# Patient Record
Sex: MALE | Race: WHITE | NOT HISPANIC OR LATINO | Employment: FULL TIME | URBAN - METROPOLITAN AREA
[De-identification: names, ages, dates, MRNs, and addresses within clinical notes are randomized per-mention and may not be internally consistent; named-entity substitution may affect disease eponyms.]

---

## 2022-06-29 ENCOUNTER — OFFICE VISIT (OUTPATIENT)
Dept: RADIATION ONCOLOGY | Facility: HOSPITAL | Age: 67
End: 2022-06-29

## 2022-06-29 ENCOUNTER — HOSPITAL ENCOUNTER (OUTPATIENT)
Dept: RADIATION ONCOLOGY | Facility: HOSPITAL | Age: 67
Setting detail: RADIATION/ONCOLOGY SERIES
Discharge: HOME | End: 2022-06-29

## 2022-06-29 VITALS
DIASTOLIC BLOOD PRESSURE: 68 MMHG | WEIGHT: 269.9 LBS | TEMPERATURE: 98.5 F | BODY MASS INDEX: 36.56 KG/M2 | HEIGHT: 72 IN | RESPIRATION RATE: 18 BRPM | OXYGEN SATURATION: 94 % | SYSTOLIC BLOOD PRESSURE: 107 MMHG | HEART RATE: 90 BPM

## 2022-06-29 DIAGNOSIS — C67.4 MALIGNANT NEOPLASM OF POSTERIOR WALL OF URINARY BLADDER: Primary | ICD-10-CM

## 2022-06-29 RX ORDER — LOSARTAN POTASSIUM 50 MG/1
50 TABLET ORAL DAILY
COMMUNITY
Start: 2022-06-20

## 2022-06-29 RX ORDER — VERAPAMIL HYDROCHLORIDE 240 MG/1
240 CAPSULE, EXTENDED RELEASE ORAL NIGHTLY
COMMUNITY
Start: 2022-06-20

## 2022-06-29 RX ORDER — FEXOFENADINE HCL 180 MG/1
180 TABLET ORAL DAILY
COMMUNITY

## 2022-06-29 RX ORDER — GABAPENTIN 600 MG/1
600 TABLET ORAL 3 TIMES DAILY
COMMUNITY
Start: 2022-06-23

## 2022-06-29 RX ORDER — ATORVASTATIN CALCIUM 20 MG/1
20 TABLET, FILM COATED ORAL DAILY
COMMUNITY
Start: 2022-06-20

## 2022-06-29 RX ORDER — FAMOTIDINE 40 MG/1
40 TABLET, FILM COATED ORAL 3 TIMES DAILY
COMMUNITY
Start: 2022-06-20

## 2022-06-29 RX ORDER — MULTIPLE VITAMINS W/ MINERALS TAB 9MG-400MCG
1 TAB ORAL DAILY
COMMUNITY

## 2022-06-29 RX ORDER — HYDROCODONE BITARTRATE AND ACETAMINOPHEN 10; 325 MG/1; MG/1
1 TABLET ORAL EVERY 8 HOURS PRN
COMMUNITY
Start: 2022-06-23 | End: 2022-08-07 | Stop reason: HOSPADM

## 2022-06-29 RX ORDER — MONTELUKAST SODIUM 10 MG/1
10 TABLET ORAL NIGHTLY
COMMUNITY
Start: 2022-06-20

## 2022-06-29 RX ORDER — ALFUZOSIN HYDROCHLORIDE 10 MG/1
10 TABLET, EXTENDED RELEASE ORAL EVERY EVENING
COMMUNITY
Start: 2022-05-13 | End: 2022-08-02

## 2022-06-29 RX ORDER — TIZANIDINE 4 MG/1
4 TABLET ORAL EVERY 8 HOURS PRN
COMMUNITY
Start: 2022-06-20

## 2022-06-29 NOTE — PROGRESS NOTES
CONSULTATION NOTE      :                                                          1955  DATE OF CONSULTATION:                       2022   REQUESTING PHYSICIAN:                   Godfrey Lawrence Jr*  REASON FOR CONSULTATION:           Muscle-invasive bladder cancer  Cancer Staging  Stage II (cT2, cN0, cM0)    Thank you for requesting my services in evaluation of this pleasant individual.  I am seeing them in outpatient consultation regarding a diagnosis of bladder cancer.     BRIEF HISTORY:  The patient is a very pleasant 66 y.o. male  with a past medical history significant for COPD and GERD, who continues to smoke 2 packs/day, and presented approximately 1 month ago with new lower urinary tract symptoms and gross hematuria.  He underwent a cystoscopy with Dr. Lawrence, which revealed a posterior bladder wall tumor obstructing the right ureteral orifice.  Biopsies were consistent with a high-grade urothelial carcinoma invading the muscle wall, with lymphovascular and perineural invasion.  He was presented several different options for treatment, and he has elected to undergo cystectomy upfront and has surgery scheduled for first week in August.  He presents today for evaluation of radiation therapy.  From a symptomatic standpoint, he reports some ongoing gross hematuria which is intermittent in nature and he feels like he sees it 1-2 times per week, but it always seems to clear.  He also reports somewhat of a weak stream and some occasional burning with urination.  He denies weight loss or any other gastrointestinal symptoms.  He continues to admit to smoking 2 packs/day which he says he does not really want to quit right now.    Allergy:   Allergies   Allergen Reactions   • Food Color Orange [Yellow Dye] Nausea Only   • Tetanus Toxoids Swelling       Social History:   Social History     Socioeconomic History   • Marital status: Single   Tobacco Use   • Smoking status: Current Every Day Smoker     " Packs/day: 2.00     Years: 50.00     Pack years: 100.00     Types: Cigarettes   • Smokeless tobacco: Never Used   Substance and Sexual Activity   • Alcohol use: Yes     Comment: occ   • Drug use: Not Currently   • Sexual activity: Defer       Past Medical History:   Past Medical History:   Diagnosis Date   • Bladder cancer (HCC)    • COPD (chronic obstructive pulmonary disease) (HCC)    • GERD (gastroesophageal reflux disease)        Family History: family history includes Cancer in his brother; Throat cancer in his brother.     Surgical History:   Past Surgical History:   Procedure Laterality Date   • COLONOSCOPY      2018   • CYSTOSCOPY BLADDER BIOPSY          Review of Systems:   Review of Systems   Respiratory: Positive for shortness of breath (with exertion).    Genitourinary: Positive for dysuria.    All other systems reviewed and are negative.          Objective   VITAL SIGNS:   Vitals:    06/29/22 1539   BP: 107/68   Pulse: 90   Resp: 18   Temp: 98.5 °F (36.9 °C)   TempSrc: Temporal   SpO2: 94%   Weight: 122 kg (269 lb 14.4 oz)   Height: 182.9 cm (72\")   PainSc: 0-No pain        Karnofsky score: 80       Physical Exam:   Physical Exam  Vitals and nursing note reviewed.   Constitutional:       General: He is not in acute distress.     Appearance: He is well-developed.   HENT:      Head: Normocephalic and atraumatic.   Eyes:      Conjunctiva/sclera: Conjunctivae normal.      Pupils: Pupils are equal, round, and reactive to light.   Cardiovascular:      Rate and Rhythm: Normal rate and regular rhythm.      Heart sounds: No murmur heard.    No friction rub.   Pulmonary:      Effort: Pulmonary effort is normal.      Breath sounds: Normal breath sounds. No wheezing.   Abdominal:      General: Bowel sounds are normal. There is no distension.      Palpations: Abdomen is soft. There is no mass.      Tenderness: There is no abdominal tenderness.   Musculoskeletal:         General: Normal range of motion.      " Cervical back: Normal range of motion and neck supple.   Lymphadenopathy:      Cervical: No cervical adenopathy.   Skin:     General: Skin is warm and dry.   Neurological:      Mental Status: He is alert and oriented to person, place, and time.   Psychiatric:         Behavior: Behavior normal.         Thought Content: Thought content normal.         Judgment: Judgment normal.          PATHOLOGY  TURBT 6/2/2022:  Invasive, high gtade urothelial carcinoma  Positive for LORENE-3, P63, HMW cytokeratin  Negative for NKX-3.1, PSA, Racemase  -Tumor extensively invased the muscular wall of the bladder, including LVSI and PNI    IMAGING  I have personally reviewed the relevant imaging studies, as follows:  CT stone protocol 5/31/2022:  Both kidneys are normal in location and size.  There is a cyst involving the posterior inferior right aspect of the bladder measuring 1.8 cm.  On the right there is delayed filling of the collecting system with blunting of the calyceal fornices and dilation of the entire right ureter.  Hydronephrosis on the right relating to the presence of a 5.2 cm mass involving the posterior lateral right urinary bladder.    The following portions of the patient's history were reviewed and updated as appropriate: allergies, current medications, past family history, past medical history, past social history, past surgical history and problem list.    Assessment:   Assessment  Mr. Flores is a 66-year-old gentleman who presents now with a new diagnosis of muscle invasive bladder cancer that appears to be confined to the bladder, and with no evidence of perivesical stranding or lymph node involvement.  As mentioned, the patient has opted for radical cystectomy upfront and is currently scheduled for this procedure the first week of August.  I briefly discussed with him alternative options such as neoadjuvant chemotherapy followed by radical cystectomy, and organ preservation therapy.  I agree that he is likely to  be well managed with surgery alone, and this is the patient's preference.  At this time, there is no further indications for radiation treatments.  I did  him at length about smoking cessation and how this would not only impact the management of his cancer but also his tolerance and recovery from his upcoming surgery.  He did not seem to be very interested in quitting smoking, but at least agreed that he can try to cut back from 2 packs down to 1 pack/day.    RECOMMENDATIONS:    1. Plan for surgical resection of his bladder cancer with Dr. Lawrence in August 2022.  2. Recommended smoking cessation/reduction    I spent a total of 45 minutes on todays visit, with more than 30 minutes in direct face to face communication, and the remainder of the time spent in reviewing the relevant history, records, available imaging, and for documentation.    Follow Up:   Return Oncologic and Follow-up care per Urology.  Diagnoses and all orders for this visit:    1. Malignant neoplasm of posterior wall of urinary bladder (HCC) (Primary)    Thank you for allowing me to participate in the care of this individual.    Sincerely,       Kev Cobb MD

## 2022-08-01 ENCOUNTER — ANESTHESIA EVENT (OUTPATIENT)
Dept: PERIOP | Facility: HOSPITAL | Age: 67
End: 2022-08-01

## 2022-08-01 ENCOUNTER — HOSPITAL ENCOUNTER (OUTPATIENT)
Dept: GENERAL RADIOLOGY | Facility: HOSPITAL | Age: 67
Discharge: HOME | End: 2022-08-01

## 2022-08-01 ENCOUNTER — PRE-ADMISSION TESTING (OUTPATIENT)
Dept: PREADMISSION TESTING | Facility: HOSPITAL | Age: 67
End: 2022-08-01

## 2022-08-01 VITALS — BODY MASS INDEX: 36.43 KG/M2 | WEIGHT: 268.96 LBS | HEIGHT: 72 IN

## 2022-08-01 DIAGNOSIS — C67.4 MALIGNANT NEOPLASM OF POSTERIOR WALL OF URINARY BLADDER: ICD-10-CM

## 2022-08-01 LAB
ALBUMIN SERPL-MCNC: 4.4 G/DL (ref 3.5–5.2)
ALBUMIN/GLOB SERPL: 1.8 G/DL
ALP SERPL-CCNC: 88 U/L (ref 39–117)
ALT SERPL W P-5'-P-CCNC: 11 U/L (ref 1–41)
ANION GAP SERPL CALCULATED.3IONS-SCNC: 9 MMOL/L (ref 5–15)
AST SERPL-CCNC: 12 U/L (ref 1–40)
BILIRUB SERPL-MCNC: 0.3 MG/DL (ref 0–1.2)
BILIRUB UR QL STRIP: NEGATIVE
BUN SERPL-MCNC: 10 MG/DL (ref 8–23)
BUN/CREAT SERPL: 6.5 (ref 7–25)
CALCIUM SPEC-SCNC: 9.6 MG/DL (ref 8.6–10.5)
CHLORIDE SERPL-SCNC: 104 MMOL/L (ref 98–107)
CLARITY UR: CLEAR
CO2 SERPL-SCNC: 26 MMOL/L (ref 22–29)
COLOR UR: YELLOW
CREAT SERPL-MCNC: 1.54 MG/DL (ref 0.76–1.27)
DEPRECATED RDW RBC AUTO: 46.7 FL (ref 37–54)
EGFRCR SERPLBLD CKD-EPI 2021: 50.1 ML/MIN/1.73
ERYTHROCYTE [DISTWIDTH] IN BLOOD BY AUTOMATED COUNT: 14.6 % (ref 12.3–15.4)
GLOBULIN UR ELPH-MCNC: 2.5 GM/DL
GLUCOSE SERPL-MCNC: 100 MG/DL (ref 65–99)
GLUCOSE UR STRIP-MCNC: NEGATIVE MG/DL
HCT VFR BLD AUTO: 40 % (ref 37.5–51)
HGB BLD-MCNC: 13.5 G/DL (ref 13–17.7)
HGB UR QL STRIP.AUTO: NEGATIVE
KETONES UR QL STRIP: NEGATIVE
LEUKOCYTE ESTERASE UR QL STRIP.AUTO: NEGATIVE
MCH RBC QN AUTO: 29.7 PG (ref 26.6–33)
MCHC RBC AUTO-ENTMCNC: 33.8 G/DL (ref 31.5–35.7)
MCV RBC AUTO: 88.1 FL (ref 79–97)
NITRITE UR QL STRIP: NEGATIVE
PH UR STRIP.AUTO: 6.5 [PH] (ref 5–8)
PLATELET # BLD AUTO: 248 10*3/MM3 (ref 140–450)
PMV BLD AUTO: 9.4 FL (ref 6–12)
POTASSIUM SERPL-SCNC: 4.4 MMOL/L (ref 3.5–5.2)
PROT SERPL-MCNC: 6.9 G/DL (ref 6–8.5)
PROT UR QL STRIP: NEGATIVE
RBC # BLD AUTO: 4.54 10*6/MM3 (ref 4.14–5.8)
SARS-COV-2 RNA PNL SPEC NAA+PROBE: NOT DETECTED
SODIUM SERPL-SCNC: 139 MMOL/L (ref 136–145)
SP GR UR STRIP: <=1.005 (ref 1–1.03)
UROBILINOGEN UR QL STRIP: NORMAL
WBC NRBC COR # BLD: 6.48 10*3/MM3 (ref 3.4–10.8)

## 2022-08-01 PROCEDURE — 93010 ELECTROCARDIOGRAM REPORT: CPT | Performed by: INTERNAL MEDICINE

## 2022-08-01 PROCEDURE — 81003 URINALYSIS AUTO W/O SCOPE: CPT

## 2022-08-01 PROCEDURE — C9803 HOPD COVID-19 SPEC COLLECT: HCPCS

## 2022-08-01 PROCEDURE — 71046 X-RAY EXAM CHEST 2 VIEWS: CPT

## 2022-08-01 PROCEDURE — 80053 COMPREHEN METABOLIC PANEL: CPT

## 2022-08-01 PROCEDURE — 93005 ELECTROCARDIOGRAM TRACING: CPT

## 2022-08-01 PROCEDURE — 86901 BLOOD TYPING SEROLOGIC RH(D): CPT

## 2022-08-01 PROCEDURE — 36415 COLL VENOUS BLD VENIPUNCTURE: CPT

## 2022-08-01 PROCEDURE — U0004 COV-19 TEST NON-CDC HGH THRU: HCPCS

## 2022-08-01 PROCEDURE — 86900 BLOOD TYPING SEROLOGIC ABO: CPT

## 2022-08-01 PROCEDURE — 85027 COMPLETE CBC AUTOMATED: CPT

## 2022-08-01 RX ORDER — ASPIRIN 325 MG
325 TABLET ORAL DAILY
COMMUNITY
End: 2022-11-04

## 2022-08-01 RX ORDER — SODIUM CHLORIDE 0.9 % (FLUSH) 0.9 %
10 SYRINGE (ML) INJECTION EVERY 12 HOURS SCHEDULED
Status: CANCELLED | OUTPATIENT
Start: 2022-08-01

## 2022-08-01 RX ORDER — NICOTINE 21 MG/24HR
1 PATCH, TRANSDERMAL 24 HOURS TRANSDERMAL EVERY 24 HOURS
COMMUNITY

## 2022-08-01 RX ORDER — ALBUTEROL SULFATE 90 UG/1
2 AEROSOL, METERED RESPIRATORY (INHALATION) EVERY 4 HOURS PRN
COMMUNITY

## 2022-08-01 RX ORDER — SODIUM CHLORIDE 0.9 % (FLUSH) 0.9 %
10 SYRINGE (ML) INJECTION AS NEEDED
Status: CANCELLED | OUTPATIENT
Start: 2022-08-01

## 2022-08-01 NOTE — PAT
The following information and instructions were given:    Do not eat, drink, smoke or chew gum after midnight the night before surgery. This includes no mints.  Take all routine, prescribed medications including heart and blood pressure medicines with a sip of water unless otherwise instructed by your physician.   Do NOT take diabetic medication unless instructed by your physician.    DO NOT shave for two days before your procedure.  Do not wear makeup.      DO NOT wear fingernail polish (gel/regular) and/or acrylic/artificial nails on the day of surgery.   If you had a recent manicure and would rather not remove polish or artificial nails, the minimum requirement is that the polish/artificial nails must be removed from the middle finger on each hand.      If you are having surgery/procedure on an upper extremity, fingernail polish/artificial fingernails must be removed for surgery.  NO EXCEPTIONS.      If you are having surgery/procedure on a lower extremity, toenail polish on both extremities must be removed for surgery.  NO EXCEPTIONS.    Remove all jewelry (advise to go to jeweler if unable to remove).  Jewelry, especially rings, can no longer be taped for surgery.    Leave anything you consider valuable at home.    Leave your suitcase in the car until after your surgery.    Bring the following with you the day of your procedure (when applicable):       -Picture ID and insurance cards       -Co-pay/deductible required by insurance       -Medications in the original bottles (not a list) including all over-the-counter medications if not brought to PAT       -Copy of advance directive, living will or power of  documents if not brought to PAT       -CPAP or BIPAP mask and tubing (do not bring machine)       -Skin prep instruction(s) sheet       -PAT Pass    Education booklet (when applicable), brochure, handout or binder related to procedure given to patient.  Education booklet also includes general  information related to their recovery that mentions signs and symptoms of infection and when to call the doctor.    When applicable, an ERAS handout was given to patient.    Respirex use and pneumonia prevention education provided in Pre Admission Testing general education video.    Signs and Symptoms of infection discussed with patient in Pre Admission Testing. Information related to infection and hand hygiene mentioned in Pre Admission Testing general education video. Patient instructed to call their doctor if any of the following symptoms are noted during recovery:  Fever of 100.4 F or higher, incision that is warm or has increasing bleeding, redness or drainage.    DVT Prevention instructions given in general education video presentation during Pre Admission Testing appointment that stress the importance of ambulation to improve blood circulation.  Also encouraged patient to perform foot exercises when in bed and application of a sequential device may be applied to lower extremities to improve circulation.      Please apply Chlorhexidine wipes to surgical area (if instructed) the night before procedure and the AM of procedure and document date/time of applications on skin prep instruction sheet.        An arrival time for procedure was not given during PAT visit. If patient had any questions or concerns about their arrival time, they were instructed to contact their surgeon/physician.  Additionally, if the patient referred to an arrival time that was acquired from their my chart account, patient was encouraged to verify that time with their surgeon/physician.  NO arrival times given in Pre Admission Testing Department.    Patient instructed to drink 20 ounces (or until full) of Gatorade and it needs to be completed 1 hour (for Main OR patients) or 2 hours (scheduled  section patients) before given arrival time for procedure (NO RED Gatorade)    Patient verbalized understanding.

## 2022-08-02 ENCOUNTER — ANESTHESIA (OUTPATIENT)
Dept: PERIOP | Facility: HOSPITAL | Age: 67
End: 2022-08-02

## 2022-08-02 ENCOUNTER — ANESTHESIA EVENT CONVERTED (OUTPATIENT)
Dept: ANESTHESIOLOGY | Facility: HOSPITAL | Age: 67
End: 2022-08-02

## 2022-08-02 ENCOUNTER — APPOINTMENT (OUTPATIENT)
Dept: GENERAL RADIOLOGY | Facility: HOSPITAL | Age: 67
End: 2022-08-02

## 2022-08-02 ENCOUNTER — HOSPITAL ENCOUNTER (INPATIENT)
Facility: HOSPITAL | Age: 67
LOS: 5 days | Discharge: HOME | End: 2022-08-07
Attending: UROLOGY | Admitting: UROLOGY

## 2022-08-02 DIAGNOSIS — J44.9 CHRONIC OBSTRUCTIVE PULMONARY DISEASE, UNSPECIFIED COPD TYPE: ICD-10-CM

## 2022-08-02 DIAGNOSIS — I10 HYPERTENSION, UNSPECIFIED TYPE: ICD-10-CM

## 2022-08-02 DIAGNOSIS — Z90.79 S/P PROSTATECTOMY: ICD-10-CM

## 2022-08-02 DIAGNOSIS — C67.4 MALIGNANT NEOPLASM OF POSTERIOR WALL OF URINARY BLADDER: Primary | ICD-10-CM

## 2022-08-02 DIAGNOSIS — C67.9 BLADDER CANCER: ICD-10-CM

## 2022-08-02 PROBLEM — N28.9 RENAL INSUFFICIENCY: Status: ACTIVE | Noted: 2022-08-02

## 2022-08-02 PROBLEM — K21.9 GERD (GASTROESOPHAGEAL REFLUX DISEASE): Status: ACTIVE | Noted: 2022-08-02

## 2022-08-02 PROBLEM — F17.200 SMOKER: Status: ACTIVE | Noted: 2022-08-02

## 2022-08-02 PROBLEM — E78.5 HLD (HYPERLIPIDEMIA): Status: ACTIVE | Noted: 2022-08-02

## 2022-08-02 LAB
ABO GROUP BLD: NORMAL
ABO GROUP BLD: NORMAL
BLD GP AB SCN SERPL QL: NEGATIVE
QT INTERVAL: 386 MS
QTC INTERVAL: 428 MS
RH BLD: NEGATIVE
RH BLD: NEGATIVE
T&S EXPIRATION DATE: NORMAL

## 2022-08-02 PROCEDURE — 0T184ZC BYPASS BILATERAL URETERS TO ILEOCUTANEOUS, PERCUTANEOUS ENDOSCOPIC APPROACH: ICD-10-PCS | Performed by: UROLOGY

## 2022-08-02 PROCEDURE — 25010000002 SODIUM CHLORIDE 0.9 % WITH KCL 20 MEQ 20-0.9 MEQ/L-% SOLUTION: Performed by: UROLOGY

## 2022-08-02 PROCEDURE — 88307 TISSUE EXAM BY PATHOLOGIST: CPT | Performed by: UROLOGY

## 2022-08-02 PROCEDURE — 0TBD4ZX EXCISION OF URETHRA, PERCUTANEOUS ENDOSCOPIC APPROACH, DIAGNOSTIC: ICD-10-PCS | Performed by: UROLOGY

## 2022-08-02 PROCEDURE — 88305 TISSUE EXAM BY PATHOLOGIST: CPT | Performed by: UROLOGY

## 2022-08-02 PROCEDURE — 88309 TISSUE EXAM BY PATHOLOGIST: CPT | Performed by: UROLOGY

## 2022-08-02 PROCEDURE — 0TB64ZX EXCISION OF RIGHT URETER, PERCUTANEOUS ENDOSCOPIC APPROACH, DIAGNOSTIC: ICD-10-PCS | Performed by: UROLOGY

## 2022-08-02 PROCEDURE — 86850 RBC ANTIBODY SCREEN: CPT | Performed by: UROLOGY

## 2022-08-02 PROCEDURE — 25010000002 FENTANYL CITRATE (PF) 50 MCG/ML SOLUTION: Performed by: NURSE ANESTHETIST, CERTIFIED REGISTERED

## 2022-08-02 PROCEDURE — 55866 LAPS SURG PRST8ECT RPBIC RAD: CPT | Performed by: PHYSICIAN ASSISTANT

## 2022-08-02 PROCEDURE — 0TB74ZX EXCISION OF LEFT URETER, PERCUTANEOUS ENDOSCOPIC APPROACH, DIAGNOSTIC: ICD-10-PCS | Performed by: UROLOGY

## 2022-08-02 PROCEDURE — 8E0W4CZ ROBOTIC ASSISTED PROCEDURE OF TRUNK REGION, PERCUTANEOUS ENDOSCOPIC APPROACH: ICD-10-PCS | Performed by: UROLOGY

## 2022-08-02 PROCEDURE — 25010000002 ONDANSETRON PER 1 MG: Performed by: NURSE ANESTHETIST, CERTIFIED REGISTERED

## 2022-08-02 PROCEDURE — 25010000002 DEXAMETHASONE SODIUM PHOSPHATE 10 MG/ML SOLUTION: Performed by: NURSE ANESTHETIST, CERTIFIED REGISTERED

## 2022-08-02 PROCEDURE — 86901 BLOOD TYPING SEROLOGIC RH(D): CPT | Performed by: UROLOGY

## 2022-08-02 PROCEDURE — 86900 BLOOD TYPING SEROLOGIC ABO: CPT | Performed by: UROLOGY

## 2022-08-02 PROCEDURE — 25010000002 FENTANYL CITRATE (PF) 50 MCG/ML SOLUTION

## 2022-08-02 PROCEDURE — 94799 UNLISTED PULMONARY SVC/PX: CPT

## 2022-08-02 PROCEDURE — 25010000002 CEFOXITIN PER 1 G: Performed by: UROLOGY

## 2022-08-02 PROCEDURE — 88331 PATH CONSLTJ SURG 1 BLK 1SPC: CPT | Performed by: PATHOLOGY

## 2022-08-02 PROCEDURE — 38571 LAPAROSCOPY LYMPHADENECTOMY: CPT | Performed by: PHYSICIAN ASSISTANT

## 2022-08-02 PROCEDURE — 0WQF4ZZ REPAIR ABDOMINAL WALL, PERCUTANEOUS ENDOSCOPIC APPROACH: ICD-10-PCS | Performed by: UROLOGY

## 2022-08-02 PROCEDURE — 0TTB4ZZ RESECTION OF BLADDER, PERCUTANEOUS ENDOSCOPIC APPROACH: ICD-10-PCS | Performed by: UROLOGY

## 2022-08-02 PROCEDURE — 25010000002 HYDROMORPHONE 1 MG/ML SOLUTION

## 2022-08-02 PROCEDURE — 07BC4ZX EXCISION OF PELVIS LYMPHATIC, PERCUTANEOUS ENDOSCOPIC APPROACH, DIAGNOSTIC: ICD-10-PCS | Performed by: UROLOGY

## 2022-08-02 PROCEDURE — 25010000002 PROPOFOL 10 MG/ML EMULSION: Performed by: NURSE ANESTHETIST, CERTIFIED REGISTERED

## 2022-08-02 PROCEDURE — 50820 CONSTRUCT BOWEL BLADDER: CPT | Performed by: PHYSICIAN ASSISTANT

## 2022-08-02 PROCEDURE — 0VT04ZZ RESECTION OF PROSTATE, PERCUTANEOUS ENDOSCOPIC APPROACH: ICD-10-PCS | Performed by: UROLOGY

## 2022-08-02 PROCEDURE — C2625 STENT, NON-COR, TEM W/DEL SY: HCPCS | Performed by: UROLOGY

## 2022-08-02 DEVICE — STNT URETRL SGL J 7X90: Type: IMPLANTABLE DEVICE | Site: ILEUM | Status: FUNCTIONAL

## 2022-08-02 DEVICE — PROXIMATE LINEAR CUTTER RELOAD, BLUE, 75MM
Type: IMPLANTABLE DEVICE | Site: PELVIS | Status: FUNCTIONAL
Brand: PROXIMATE

## 2022-08-02 DEVICE — PROXIMATE RELOADABLE LINEAR STAPLER
Type: IMPLANTABLE DEVICE | Site: PELVIS | Status: FUNCTIONAL
Brand: PROXIMATE

## 2022-08-02 DEVICE — PROXIMATE RELOADABLE LINEAR CUTTER WITH SAFETY LOCK-OUT, 75MM
Type: IMPLANTABLE DEVICE | Site: PELVIS | Status: FUNCTIONAL
Brand: PROXIMATE

## 2022-08-02 DEVICE — HEMOLOK L 6 CLIPS/CART
Type: IMPLANTABLE DEVICE | Site: PELVIS | Status: FUNCTIONAL
Brand: WECK

## 2022-08-02 RX ORDER — NICOTINE 21 MG/24HR
1 PATCH, TRANSDERMAL 24 HOURS TRANSDERMAL EVERY 24 HOURS
Status: DISCONTINUED | OUTPATIENT
Start: 2022-08-02 | End: 2022-08-07 | Stop reason: HOSPADM

## 2022-08-02 RX ORDER — SODIUM CHLORIDE, SODIUM LACTATE, POTASSIUM CHLORIDE, CALCIUM CHLORIDE 600; 310; 30; 20 MG/100ML; MG/100ML; MG/100ML; MG/100ML
9 INJECTION, SOLUTION INTRAVENOUS CONTINUOUS
Status: DISCONTINUED | OUTPATIENT
Start: 2022-08-02 | End: 2022-08-07 | Stop reason: HOSPADM

## 2022-08-02 RX ORDER — LIDOCAINE HYDROCHLORIDE 10 MG/ML
0.5 INJECTION, SOLUTION EPIDURAL; INFILTRATION; INTRACAUDAL; PERINEURAL ONCE AS NEEDED
Status: COMPLETED | OUTPATIENT
Start: 2022-08-02 | End: 2022-08-02

## 2022-08-02 RX ORDER — DEXAMETHASONE SODIUM PHOSPHATE 10 MG/ML
INJECTION, SOLUTION INTRAMUSCULAR; INTRAVENOUS
Status: COMPLETED | OUTPATIENT
Start: 2022-08-02 | End: 2022-08-02

## 2022-08-02 RX ORDER — VERAPAMIL HYDROCHLORIDE 240 MG/1
240 TABLET, FILM COATED, EXTENDED RELEASE ORAL NIGHTLY
Status: DISCONTINUED | OUTPATIENT
Start: 2022-08-02 | End: 2022-08-03

## 2022-08-02 RX ORDER — SODIUM CHLORIDE AND POTASSIUM CHLORIDE 150; 900 MG/100ML; MG/100ML
100 INJECTION, SOLUTION INTRAVENOUS CONTINUOUS
Status: DISCONTINUED | OUTPATIENT
Start: 2022-08-02 | End: 2022-08-07 | Stop reason: HOSPADM

## 2022-08-02 RX ORDER — ALBUTEROL SULFATE 90 UG/1
AEROSOL, METERED RESPIRATORY (INHALATION) AS NEEDED
Status: DISCONTINUED | OUTPATIENT
Start: 2022-08-02 | End: 2022-08-02 | Stop reason: SURG

## 2022-08-02 RX ORDER — NALOXONE HCL 0.4 MG/ML
0.1 VIAL (ML) INJECTION
Status: DISCONTINUED | OUTPATIENT
Start: 2022-08-02 | End: 2022-08-07 | Stop reason: HOSPADM

## 2022-08-02 RX ORDER — FAMOTIDINE 10 MG/ML
20 INJECTION, SOLUTION INTRAVENOUS ONCE
Status: DISCONTINUED | OUTPATIENT
Start: 2022-08-02 | End: 2022-08-02 | Stop reason: HOSPADM

## 2022-08-02 RX ORDER — MONTELUKAST SODIUM 10 MG/1
10 TABLET ORAL NIGHTLY
Status: DISCONTINUED | OUTPATIENT
Start: 2022-08-02 | End: 2022-08-03

## 2022-08-02 RX ORDER — FENTANYL CITRATE 50 UG/ML
INJECTION, SOLUTION INTRAMUSCULAR; INTRAVENOUS AS NEEDED
Status: DISCONTINUED | OUTPATIENT
Start: 2022-08-02 | End: 2022-08-02 | Stop reason: SURG

## 2022-08-02 RX ORDER — DOCUSATE SODIUM 100 MG/1
100 CAPSULE, LIQUID FILLED ORAL DAILY PRN
Qty: 30 CAPSULE | Refills: 1 | Status: SHIPPED | OUTPATIENT
Start: 2022-08-02 | End: 2023-08-02

## 2022-08-02 RX ORDER — ENOXAPARIN SODIUM 100 MG/ML
40 INJECTION SUBCUTANEOUS DAILY
Status: DISCONTINUED | OUTPATIENT
Start: 2022-08-03 | End: 2022-08-07 | Stop reason: HOSPADM

## 2022-08-02 RX ORDER — FENTANYL CITRATE 50 UG/ML
INJECTION, SOLUTION INTRAMUSCULAR; INTRAVENOUS
Status: COMPLETED
Start: 2022-08-02 | End: 2022-08-02

## 2022-08-02 RX ORDER — LIDOCAINE HYDROCHLORIDE 10 MG/ML
INJECTION, SOLUTION EPIDURAL; INFILTRATION; INTRACAUDAL; PERINEURAL AS NEEDED
Status: DISCONTINUED | OUTPATIENT
Start: 2022-08-02 | End: 2022-08-02 | Stop reason: SURG

## 2022-08-02 RX ORDER — FENTANYL CITRATE 50 UG/ML
50 INJECTION, SOLUTION INTRAMUSCULAR; INTRAVENOUS
Status: DISCONTINUED | OUTPATIENT
Start: 2022-08-02 | End: 2022-08-02 | Stop reason: HOSPADM

## 2022-08-02 RX ORDER — MAGNESIUM HYDROXIDE 1200 MG/15ML
LIQUID ORAL AS NEEDED
Status: DISCONTINUED | OUTPATIENT
Start: 2022-08-02 | End: 2022-08-02 | Stop reason: HOSPADM

## 2022-08-02 RX ORDER — ONDANSETRON 2 MG/ML
INJECTION INTRAMUSCULAR; INTRAVENOUS AS NEEDED
Status: DISCONTINUED | OUTPATIENT
Start: 2022-08-02 | End: 2022-08-02 | Stop reason: SURG

## 2022-08-02 RX ORDER — SCOLOPAMINE TRANSDERMAL SYSTEM 1 MG/1
1 PATCH, EXTENDED RELEASE TRANSDERMAL CONTINUOUS
Status: ACTIVE | OUTPATIENT
Start: 2022-08-02 | End: 2022-08-05

## 2022-08-02 RX ORDER — CETIRIZINE HYDROCHLORIDE 10 MG/1
10 TABLET ORAL DAILY
Status: DISCONTINUED | OUTPATIENT
Start: 2022-08-03 | End: 2022-08-03

## 2022-08-02 RX ORDER — FAMOTIDINE 20 MG/1
20 TABLET, FILM COATED ORAL ONCE
Status: COMPLETED | OUTPATIENT
Start: 2022-08-02 | End: 2022-08-02

## 2022-08-02 RX ORDER — TIZANIDINE 4 MG/1
4 TABLET ORAL EVERY 8 HOURS PRN
Status: DISCONTINUED | OUTPATIENT
Start: 2022-08-02 | End: 2022-08-03

## 2022-08-02 RX ORDER — HYDROMORPHONE HYDROCHLORIDE 1 MG/ML
0.5 INJECTION, SOLUTION INTRAMUSCULAR; INTRAVENOUS; SUBCUTANEOUS
Status: DISCONTINUED | OUTPATIENT
Start: 2022-08-02 | End: 2022-08-02 | Stop reason: HOSPADM

## 2022-08-02 RX ORDER — CEFOXITIN 2 G/1
2 INJECTION, POWDER, FOR SOLUTION INTRAVENOUS EVERY 6 HOURS
Status: DISCONTINUED | OUTPATIENT
Start: 2022-08-02 | End: 2022-08-02

## 2022-08-02 RX ORDER — ATORVASTATIN CALCIUM 20 MG/1
20 TABLET, FILM COATED ORAL NIGHTLY
Status: DISCONTINUED | OUTPATIENT
Start: 2022-08-02 | End: 2022-08-03

## 2022-08-02 RX ORDER — HYDROCODONE BITARTRATE AND ACETAMINOPHEN 7.5; 325 MG/1; MG/1
1 TABLET ORAL EVERY 6 HOURS PRN
Qty: 30 TABLET | Refills: 0 | Status: SHIPPED | OUTPATIENT
Start: 2022-08-02

## 2022-08-02 RX ORDER — ONDANSETRON 2 MG/ML
4 INJECTION INTRAMUSCULAR; INTRAVENOUS ONCE AS NEEDED
Status: DISCONTINUED | OUTPATIENT
Start: 2022-08-02 | End: 2022-08-02 | Stop reason: HOSPADM

## 2022-08-02 RX ORDER — DOCUSATE SODIUM 100 MG/1
100 CAPSULE, LIQUID FILLED ORAL 2 TIMES DAILY PRN
Status: DISCONTINUED | OUTPATIENT
Start: 2022-08-02 | End: 2022-08-03

## 2022-08-02 RX ORDER — ONDANSETRON 4 MG/1
4 TABLET, FILM COATED ORAL EVERY 6 HOURS PRN
Status: DISCONTINUED | OUTPATIENT
Start: 2022-08-02 | End: 2022-08-03

## 2022-08-02 RX ORDER — SODIUM CHLORIDE 9 MG/ML
INJECTION, SOLUTION INTRAVENOUS AS NEEDED
Status: DISCONTINUED | OUTPATIENT
Start: 2022-08-02 | End: 2022-08-02 | Stop reason: HOSPADM

## 2022-08-02 RX ORDER — ACETAMINOPHEN 650 MG/1
650 SUPPOSITORY RECTAL EVERY 6 HOURS
Status: DISCONTINUED | OUTPATIENT
Start: 2022-08-02 | End: 2022-08-03

## 2022-08-02 RX ORDER — GABAPENTIN 300 MG/1
300 CAPSULE ORAL EVERY 12 HOURS SCHEDULED
Status: DISCONTINUED | OUTPATIENT
Start: 2022-08-02 | End: 2022-08-03

## 2022-08-02 RX ORDER — PROPOFOL 10 MG/ML
VIAL (ML) INTRAVENOUS AS NEEDED
Status: DISCONTINUED | OUTPATIENT
Start: 2022-08-02 | End: 2022-08-02 | Stop reason: SURG

## 2022-08-02 RX ORDER — IPRATROPIUM BROMIDE AND ALBUTEROL SULFATE 2.5; .5 MG/3ML; MG/3ML
3 SOLUTION RESPIRATORY (INHALATION)
Status: DISCONTINUED | OUTPATIENT
Start: 2022-08-02 | End: 2022-08-07 | Stop reason: HOSPADM

## 2022-08-02 RX ORDER — ONDANSETRON 2 MG/ML
4 INJECTION INTRAMUSCULAR; INTRAVENOUS EVERY 6 HOURS PRN
Status: DISCONTINUED | OUTPATIENT
Start: 2022-08-02 | End: 2022-08-03

## 2022-08-02 RX ORDER — OXYCODONE HYDROCHLORIDE 5 MG/1
5 TABLET ORAL EVERY 4 HOURS PRN
Status: DISCONTINUED | OUTPATIENT
Start: 2022-08-02 | End: 2022-08-03

## 2022-08-02 RX ORDER — DEXAMETHASONE SODIUM PHOSPHATE 10 MG/ML
INJECTION, SOLUTION INTRAMUSCULAR; INTRAVENOUS AS NEEDED
Status: DISCONTINUED | OUTPATIENT
Start: 2022-08-02 | End: 2022-08-02 | Stop reason: SURG

## 2022-08-02 RX ORDER — MELOXICAM 15 MG/1
15 TABLET ORAL ONCE
Status: COMPLETED | OUTPATIENT
Start: 2022-08-02 | End: 2022-08-02

## 2022-08-02 RX ORDER — HYDROMORPHONE HCL 110MG/55ML
0.5 PATIENT CONTROLLED ANALGESIA SYRINGE INTRAVENOUS
Status: DISCONTINUED | OUTPATIENT
Start: 2022-08-02 | End: 2022-08-07 | Stop reason: HOSPADM

## 2022-08-02 RX ORDER — ACETAMINOPHEN 500 MG
1000 TABLET ORAL ONCE
Status: COMPLETED | OUTPATIENT
Start: 2022-08-02 | End: 2022-08-02

## 2022-08-02 RX ORDER — POLYETHYLENE GLYCOL 3350 17 G/17G
17 POWDER, FOR SOLUTION ORAL DAILY
Qty: 30 PACKET | Refills: 0 | Status: SHIPPED | OUTPATIENT
Start: 2022-08-02

## 2022-08-02 RX ORDER — GABAPENTIN 100 MG/1
100 CAPSULE ORAL 3 TIMES DAILY
Status: DISCONTINUED | OUTPATIENT
Start: 2022-08-02 | End: 2022-08-02 | Stop reason: SDUPTHER

## 2022-08-02 RX ORDER — BUPIVACAINE HYDROCHLORIDE 2.5 MG/ML
INJECTION, SOLUTION EPIDURAL; INFILTRATION; INTRACAUDAL
Status: COMPLETED | OUTPATIENT
Start: 2022-08-02 | End: 2022-08-02

## 2022-08-02 RX ORDER — ACETAMINOPHEN 325 MG/1
650 TABLET ORAL EVERY 6 HOURS
Status: DISCONTINUED | OUTPATIENT
Start: 2022-08-02 | End: 2022-08-03

## 2022-08-02 RX ORDER — GABAPENTIN 300 MG/1
600 CAPSULE ORAL ONCE
Status: COMPLETED | OUTPATIENT
Start: 2022-08-02 | End: 2022-08-02

## 2022-08-02 RX ORDER — CEFOXITIN 2 G/1
2 INJECTION, POWDER, FOR SOLUTION INTRAVENOUS ONCE
Status: DISCONTINUED | OUTPATIENT
Start: 2022-08-02 | End: 2022-08-02

## 2022-08-02 RX ORDER — ULTRASOUND COUPLING MEDIUM
GEL (GRAM) TOPICAL AS NEEDED
Status: DISCONTINUED | OUTPATIENT
Start: 2022-08-02 | End: 2022-08-02 | Stop reason: HOSPADM

## 2022-08-02 RX ORDER — MIDAZOLAM HYDROCHLORIDE 1 MG/ML
1 INJECTION INTRAMUSCULAR; INTRAVENOUS
Status: DISCONTINUED | OUTPATIENT
Start: 2022-08-02 | End: 2022-08-02 | Stop reason: HOSPADM

## 2022-08-02 RX ORDER — NITROFURANTOIN 25; 75 MG/1; MG/1
100 CAPSULE ORAL 2 TIMES DAILY
Qty: 14 CAPSULE | Refills: 0 | Status: SHIPPED | OUTPATIENT
Start: 2022-08-02 | End: 2022-09-08

## 2022-08-02 RX ORDER — LOSARTAN POTASSIUM 50 MG/1
50 TABLET ORAL DAILY
Status: DISCONTINUED | OUTPATIENT
Start: 2022-08-02 | End: 2022-08-03

## 2022-08-02 RX ORDER — ALBUTEROL SULFATE 90 UG/1
2 AEROSOL, METERED RESPIRATORY (INHALATION) EVERY 4 HOURS PRN
Status: DISCONTINUED | OUTPATIENT
Start: 2022-08-02 | End: 2022-08-07 | Stop reason: HOSPADM

## 2022-08-02 RX ORDER — ROCURONIUM BROMIDE 10 MG/ML
INJECTION, SOLUTION INTRAVENOUS AS NEEDED
Status: DISCONTINUED | OUTPATIENT
Start: 2022-08-02 | End: 2022-08-02 | Stop reason: SURG

## 2022-08-02 RX ORDER — PANTOPRAZOLE SODIUM 40 MG/1
40 TABLET, DELAYED RELEASE ORAL
Status: DISCONTINUED | OUTPATIENT
Start: 2022-08-03 | End: 2022-08-03

## 2022-08-02 RX ORDER — ACETAMINOPHEN 160 MG/5ML
650 SOLUTION ORAL EVERY 6 HOURS
Status: DISCONTINUED | OUTPATIENT
Start: 2022-08-02 | End: 2022-08-03

## 2022-08-02 RX ADMIN — GABAPENTIN 300 MG: 300 CAPSULE ORAL at 21:03

## 2022-08-02 RX ADMIN — Medication 2 G: at 13:25

## 2022-08-02 RX ADMIN — ROCURONIUM BROMIDE 50 MG: 10 INJECTION, SOLUTION INTRAVENOUS at 13:25

## 2022-08-02 RX ADMIN — HYDROMORPHONE HYDROCHLORIDE 0.5 MG: 1 INJECTION, SOLUTION INTRAMUSCULAR; INTRAVENOUS; SUBCUTANEOUS at 18:23

## 2022-08-02 RX ADMIN — ALBUTEROL SULFATE 8 PUFF: 90 AEROSOL, METERED RESPIRATORY (INHALATION) at 16:51

## 2022-08-02 RX ADMIN — LIDOCAINE HYDROCHLORIDE 0.2 ML: 10 INJECTION, SOLUTION EPIDURAL; INFILTRATION; INTRACAUDAL; PERINEURAL at 11:55

## 2022-08-02 RX ADMIN — FENTANYL CITRATE 100 MCG: 50 INJECTION, SOLUTION INTRAMUSCULAR; INTRAVENOUS at 16:43

## 2022-08-02 RX ADMIN — MONTELUKAST 10 MG: 10 TABLET, FILM COATED ORAL at 21:03

## 2022-08-02 RX ADMIN — SODIUM CHLORIDE, POTASSIUM CHLORIDE, SODIUM LACTATE AND CALCIUM CHLORIDE 9 ML/HR: 600; 310; 30; 20 INJECTION, SOLUTION INTRAVENOUS at 11:55

## 2022-08-02 RX ADMIN — FENTANYL CITRATE 100 MCG: 50 INJECTION, SOLUTION INTRAMUSCULAR; INTRAVENOUS at 16:45

## 2022-08-02 RX ADMIN — VERAPAMIL HYDROCHLORIDE 240 MG: 240 TABLET, FILM COATED, EXTENDED RELEASE ORAL at 21:35

## 2022-08-02 RX ADMIN — LOSARTAN POTASSIUM 50 MG: 50 TABLET, FILM COATED ORAL at 21:03

## 2022-08-02 RX ADMIN — FENTANYL CITRATE 50 MCG: 50 INJECTION, SOLUTION INTRAMUSCULAR; INTRAVENOUS at 17:42

## 2022-08-02 RX ADMIN — DEXAMETHASONE SODIUM PHOSPHATE 4 MG: 10 INJECTION, SOLUTION INTRAMUSCULAR; INTRAVENOUS at 13:29

## 2022-08-02 RX ADMIN — SUGAMMADEX 250 MG: 100 INJECTION, SOLUTION INTRAVENOUS at 16:23

## 2022-08-02 RX ADMIN — ONDANSETRON 4 MG: 2 INJECTION INTRAMUSCULAR; INTRAVENOUS at 13:25

## 2022-08-02 RX ADMIN — SCOPALAMINE 1 PATCH: 1 PATCH, EXTENDED RELEASE TRANSDERMAL at 12:20

## 2022-08-02 RX ADMIN — CEFOXITIN SODIUM 1 G: 1 POWDER, FOR SOLUTION INTRAVENOUS at 23:35

## 2022-08-02 RX ADMIN — ACETAMINOPHEN 1000 MG: 500 TABLET ORAL at 12:20

## 2022-08-02 RX ADMIN — BUPIVACAINE HYDROCHLORIDE 60 ML: 2.5 INJECTION, SOLUTION EPIDURAL; INFILTRATION; INTRACAUDAL; PERINEURAL at 13:29

## 2022-08-02 RX ADMIN — ROCURONIUM BROMIDE 50 MG: 10 INJECTION, SOLUTION INTRAVENOUS at 13:54

## 2022-08-02 RX ADMIN — SODIUM CHLORIDE, POTASSIUM CHLORIDE, SODIUM LACTATE AND CALCIUM CHLORIDE 9 ML/HR: 600; 310; 30; 20 INJECTION, SOLUTION INTRAVENOUS at 12:05

## 2022-08-02 RX ADMIN — FENTANYL CITRATE 100 MCG: 50 INJECTION, SOLUTION INTRAMUSCULAR; INTRAVENOUS at 13:25

## 2022-08-02 RX ADMIN — PROPOFOL 200 MG: 10 INJECTION, EMULSION INTRAVENOUS at 13:25

## 2022-08-02 RX ADMIN — MELOXICAM 15 MG: 15 TABLET ORAL at 12:21

## 2022-08-02 RX ADMIN — OXYCODONE 5 MG: 5 TABLET ORAL at 21:03

## 2022-08-02 RX ADMIN — HYDROMORPHONE HYDROCHLORIDE 0.5 MG: 1 INJECTION, SOLUTION INTRAMUSCULAR; INTRAVENOUS; SUBCUTANEOUS at 18:02

## 2022-08-02 RX ADMIN — Medication 2 G: at 15:25

## 2022-08-02 RX ADMIN — DEXAMETHASONE SODIUM PHOSPHATE 6 MG: 10 INJECTION, SOLUTION INTRAMUSCULAR; INTRAVENOUS at 13:25

## 2022-08-02 RX ADMIN — ACETAMINOPHEN 650 MG: 325 TABLET ORAL at 21:03

## 2022-08-02 RX ADMIN — FENTANYL CITRATE 50 MCG: 50 INJECTION, SOLUTION INTRAMUSCULAR; INTRAVENOUS at 17:52

## 2022-08-02 RX ADMIN — IPRATROPIUM BROMIDE AND ALBUTEROL SULFATE 3 ML: .5; 3 SOLUTION RESPIRATORY (INHALATION) at 23:01

## 2022-08-02 RX ADMIN — HYDROMORPHONE HYDROCHLORIDE 0.5 MG: 1 INJECTION, SOLUTION INTRAMUSCULAR; INTRAVENOUS; SUBCUTANEOUS at 18:09

## 2022-08-02 RX ADMIN — ROCURONIUM BROMIDE 20 MG: 10 INJECTION, SOLUTION INTRAVENOUS at 15:42

## 2022-08-02 RX ADMIN — LIDOCAINE HYDROCHLORIDE 50 MG: 10 INJECTION, SOLUTION EPIDURAL; INFILTRATION; INTRACAUDAL; PERINEURAL at 13:25

## 2022-08-02 RX ADMIN — NICOTINE 1 PATCH: 14 PATCH, EXTENDED RELEASE TRANSDERMAL at 21:04

## 2022-08-02 RX ADMIN — GABAPENTIN 600 MG: 300 CAPSULE ORAL at 12:20

## 2022-08-02 RX ADMIN — ONDANSETRON 4 MG: 2 INJECTION INTRAMUSCULAR; INTRAVENOUS at 15:45

## 2022-08-02 RX ADMIN — POTASSIUM CHLORIDE AND SODIUM CHLORIDE 100 ML/HR: 900; 150 INJECTION, SOLUTION INTRAVENOUS at 21:05

## 2022-08-02 RX ADMIN — ROCURONIUM BROMIDE 20 MG: 10 INJECTION, SOLUTION INTRAVENOUS at 14:49

## 2022-08-02 RX ADMIN — FAMOTIDINE 20 MG: 20 TABLET ORAL at 12:24

## 2022-08-02 RX ADMIN — ATORVASTATIN CALCIUM 20 MG: 20 TABLET, FILM COATED ORAL at 21:03

## 2022-08-02 NOTE — H&P
Pre-Op H&P  Tru Buitrago  [id number]  1955      Chief complaint: Bladder cancer      Subjective:  Patient is a 66 y.o.male presents for scheduled surgery by Dr. Melinda Garrison. He anticipates a CYSTO-PROSTATECTOMY, PELVIC LYMPH NODE DISSECTION LAPAROSCOPIC WITH DAVINCI ROBOT, CREATION OF ILEAL CONDUIT today. He underwent cystoscopy on work-up of gross hematuria and was found to have posterior bladder wall tumor obstructing the right ureteral orifice. Pathology showed high-grade urothelial carcinoma. He denies any hematuria in the past couple weeks.      Review of Systems:  Constitutional-- No fever, chills or sweats. No fatigue.  CV-- No chest pain, palpitation or syncope. +HTN, HLD  Resp-- No cough, hemoptysis. +SOB, COPD  Skin--No rashes or lesions      Allergies:   Allergies   Allergen Reactions   • Food Color Orange [Yellow Dye] Nausea Only   • Tetanus Toxoids Swelling         Home Meds:  Medications Prior to Admission   Medication Sig Dispense Refill Last Dose   • Anoro Ellipta 62.5-25 MCG/INH aerosol powder  inhaler Inhale 1 puff Daily.   8/2/2022 at Unknown time   • atorvastatin (LIPITOR) 20 MG tablet Take 20 mg by mouth Daily.   8/1/2022 at Unknown time   • famotidine (PEPCID) 40 MG tablet Take 40 mg by mouth 2 (Two) Times a Day.   8/2/2022 at Unknown time   • fexofenadine (ALLEGRA) 180 MG tablet Take 180 mg by mouth Daily.   8/2/2022 at Unknown time   • gabapentin (NEURONTIN) 600 MG tablet Take 600 mg by mouth 3 (Three) Times a Day.   8/2/2022 at Unknown time   • HYDROcodone-acetaminophen (NORCO)  MG per tablet Take 1 tablet by mouth Every 8 (Eight) Hours As Needed for Moderate Pain .   8/2/2022 at Unknown time   • losartan (COZAAR) 50 MG tablet Take 50 mg by mouth Daily.   8/1/2022 at Unknown time   • montelukast (SINGULAIR) 10 MG tablet Take 10 mg by mouth Every Night.   8/1/2022 at Unknown time   • multivitamin with minerals tablet tablet Take 1 tablet by mouth Daily.   7/26/2022 at  "Unknown time   • tiZANidine (ZANAFLEX) 4 MG tablet Take 4 mg by mouth Every 8 (Eight) Hours As Needed.   8/2/2022 at Unknown time   • verapamil ER (VERELAN) 240 MG 24 hr capsule Take 240 mg by mouth Every Night.   8/1/2022 at Unknown time   • albuterol sulfate  (90 Base) MCG/ACT inhaler Inhale 2 puffs Every 4 (Four) Hours As Needed for Wheezing.   7/29/2022   • aspirin 325 MG tablet Take 325 mg by mouth Daily.   More than a month at Unknown time   • nicotine (NICODERM CQ) 14 MG/24HR patch Place 1 patch on the skin as directed by provider Daily.            PMH:   Past Medical History:   Diagnosis Date   • Bladder cancer (HCC)    • COPD (chronic obstructive pulmonary disease) (HCC)    • GERD (gastroesophageal reflux disease)    • Hyperlipidemia    • Hypertension      PSH:    Past Surgical History:   Procedure Laterality Date   • COLONOSCOPY      2018   • CYSTOSCOPY BLADDER BIOPSY         Immunization History:  Influenza: 2021  Pneumococcal: UTD  Tetanus: UTD  Covid x3: 2021    Social History:   Tobacco:   Social History     Tobacco Use   Smoking Status Current Every Day Smoker   • Packs/day: 1.00   • Years: 50.00   • Pack years: 50.00   • Types: Cigarettes   Smokeless Tobacco Never Used      Alcohol:     Social History     Substance and Sexual Activity   Alcohol Use Yes    Comment: occ         Physical Exam:/94 (BP Location: Right arm, Patient Position: Sitting)   Pulse 92   Temp 97.5 °F (36.4 °C) (Temporal)   Resp 18   Ht 182.9 cm (72\")   Wt 122 kg (268 lb)   SpO2 94%   BMI 36.35 kg/m²       General Appearance:    Alert, cooperative, no distress, appears stated age   Head:    Normocephalic, without obvious abnormality, atraumatic   Lungs:     Clear to auscultation bilaterally, respirations unlabored    Heart:   Regular rate and rhythm, S1 and S2 normal    Abdomen:    Soft without tenderness   Extremities:   Extremities normal, atraumatic, no cyanosis or edema   Skin:   Skin color, texture, turgor " normal, no rashes or lesions   Neurologic:   Grossly intact     Results Review:     LABS:  Lab Results   Component Value Date    WBC 6.48 08/01/2022    HGB 13.5 08/01/2022    HCT 40.0 08/01/2022    MCV 88.1 08/01/2022     08/01/2022    GLUCOSE 100 (H) 08/01/2022    BUN 10 08/01/2022    CREATININE 1.54 (H) 08/01/2022     08/01/2022    K 4.4 08/01/2022     08/01/2022    CO2 26.0 08/01/2022    CALCIUM 9.6 08/01/2022    ALBUMIN 4.40 08/01/2022    AST 12 08/01/2022    ALT 11 08/01/2022    BILITOT 0.3 08/01/2022       RADIOLOGY:  Imaging Results (Last 72 Hours)     ** No results found for the last 72 hours. **          I reviewed the patient's new clinical results.    Cancer Staging (if applicable)  Cancer Patient: __ yes __no __unknown; If yes, clinical stage T:__ N:__M:__, stage group or __N/A      Impression: Bladder cancer      Plan: CYSTO-PROSTATECTOMY, PELVIC LYMPH NODE DISSECTION LAPAROSCOPIC WITH DAVINCI ROBOT, CREATION OF ILEAL CONDUIT      ALLEN Mccoy   8/2/2022   12:35 EDT

## 2022-08-02 NOTE — OP NOTE
CYSTECTOMY LAPAROSCOPIC WITH DAVINCI ROBOT  Procedure Note    Tru Buitrago  8/2/2022    Pre-op Diagnosis:   Muscle invasive bladder cancer    Post-op Diagnosis:     Muscle invasive bladder cancer    Procedure/CPT® Codes:      Procedure(s):  Radical CYSTO-PROSTATECTOMY, PELVIC LYMPH NODE DISSECTION LAPAROSCOPIC WITH DAVINCI ROBOT, CREATION OF ILEAL CONDUIT    Surgeon(s):  Godfrey Lawrence Jr., MD    Anesthesia: General with Block    Staff:   Circulator: Una Costa RN; Tanja Herrera RN; Kirstin Mcqueen RN  Scrub Person: Gonzalez Reid; Radha Agrawal; Nael Evans  Nursing Assistant: Tom Garcias PCT; Patricia Velasquez  Assistant: Marah Hoskins PA    Assistant: Marah Hoskins PA Was needed to assist in this case with retraction, exposure, instrument placement.    Estimated Blood Loss: 600 mL  Urine Voided: 500 mL    Specimens:                Specimens     ID Source Type Tests Collected By Collected At Frozen?    A Ureter, Left Tissue · TISSUE PATHOLOGY EXAM   Godfrey Lawrence Jr., MD 8/2/22 1357 Yes    Description: DISTAL LEFT URETERAL MARGIN, MARGIN OPPOSITE STITCH , NEEDLE ON SPECIMEN     B Ureter, Right Tissue · TISSUE PATHOLOGY EXAM   Godfrey Lawrence Jr., MD 8/2/22 1405 Yes    Description: RIGHT DISTAL URETERA  MARGIN , MARGIN IN OPPOSITE STITCH     C Urethra Tissue · TISSUE PATHOLOGY EXAM   Godfrey Lawrence Jr., MD 8/2/22 1448 Yes    Description: DISTAL URETHRA MARGIN    D Urinary Bladder Tissue · TISSUE PATHOLOGY EXAM   Godfrey Lawrence Jr., MD 8/2/22 1523 No    Description: BLADDER AND PROSTATE     This specimen was not marked as sent.    E Lymph Node Tissue · TISSUE PATHOLOGY EXAM   Godfrey Lawernce Jr., MD 8/2/22 1529 No    Description: RIGHT PELVIC LYMPH NODE     This specimen was not marked as sent.    F Lymph Node Tissue · TISSUE PATHOLOGY EXAM   Godfrey Lawrence Jr., MD 8/2/22 1535 No    Description: LEFT PELVIC LYMPH NODE     This  specimen was not marked as sent.            Drains:   Closed/Suction Drain RLQ Bulb 10 Fr. (Active)       Urostomy Ileal conduit LLQ (Active)       [REMOVED] Urethral Catheter Silicone 16 Fr. (Removed)       Findings: No sign of extra vesicle disease    Complications: None    History: This is a pleasant 66-year-old gentleman who was diagnosed with muscle invasive bladder cancer.  He is opted against neoadjuvant chemotherapy and wishes to proceed with robotic assisted laparoscopic radical cystoprostatectomy with bilateral lymphadenectomy and ileal conduit urinary diversion after understanding risk benefits and alternatives.  He gives his full consent.    Operative Report: After consent is obtained patient's brought to the operating suite was placed in supine position.  Anesthesia was induced.  He is carefully placed in dorsolithotomy position padding all pressure points.  He sterilely prepped and draped in normal fashion.  Veress needle technique is used to create pneumoperitoneum.  A millimeter blunt Visiport is used in the midline just cephalad to the umbilicus.  Umbilical hernia is noted.  The remainder of the robotic and assistant trochars were placed.  Patient placed in steep Trendelenburg position the robot is docked.  Began the operation by performing distal ureterolysis on both ureters down to the level of the ureterovesical junction.  Ureters are divided and distal margins were sent off for frozen section analysis both which are negative for carcinoma.  At this point we dissected lateral to the bladder and prostate opening endopelvic fascia.  We also dissected posterior to the seminal vesicles and prostate through the posterior peritoneum creating a nice dissection plane for the vesicle and prostatic pedicles.  Prostatic and vesicle pedicles were taken down using a vessel sealer device.  We then dropped the bladder posteriorly by entering the space of Retzius.  Puboprostatic ligaments were divided.  Ligature  of 0 Vicryl sutures placed around the dorsal venous complex.  Dorsal venous complex was divided and the urethra was divided.  Specimen was placed in specimen sac.  The distal urethral margin is sent for frozen section analysis which was negative.  We then closed the urethra using a 2-0 Vicryl suture.  Hemostasis was excellent.  Bilateral pelvic lymph node dissection was carried out using her vessel sealer device for hemostasis.  We then created a space posterior to the sigmoid colon and passed her left ureter behind the sigmoid colon over to the right side in preparation for urinary diversion.  Both the right and left ureters were pexed to the anterior abdominal wall with 4-0 Monocryl suture.  Of note the right ureter was hydronephrotic from obstruction.  At this point an extraction site incision was created just below the umbilicus.  We incorporated the umbilical hernia defect with the unstrapped extraction site fascial defect.  Our specimens were delivered within their specimen sacs.  We then were able to identify the both ureters and bring them up into the incision without difficulty.  Using 3-0 Vicryl suture we identified the ureters and placed a stay suture for management.  We identified the distal ileum.  We excluded a suitable segment using JOSE F staplers.  We then performed a side to side ileal ileal anastomosis using a JOSE F stapler and TA stapler.  Mesenteric defect was closed using silk suture.  We open the ileal conduit which we had created and cleaned it out.  We then performed bilateral Charisse anastomoses with the left and right ureters after spatulation.  Ureteroenteric anastomotic stents were placed.  4-0 Monocryl was used for Charisse anastomoses in an interrupted fashion.  At this point at the site marked by the enterostomal therapy nurses we created a ileostomy for urostomy.  We secured the ileal conduit to the fascia using 2-0 Vicryl suture and performed [place] maturation.  Ureteroenteric  anastomotic stents were trimmed.  We completed our maturation of the stoma using 2-0 and 3-0 Vicryl suture.  Copious amounts of irrigation were used.  Our JUDD drain had already been placed through our most lateral right trocar site.  We closed the extraction site fascial defect using a looped PDS suture which incorporated the umbilical hernia.  All incisions were irrigated.  Subcutaneous tissues brought together in 3-0 Vicryl.  Skin was brought together using Dermabond.  Urostomy appliance was placed.  JUDD drain and was secured into place.  NG tube was placed.  Anesthesia was reversed.  Patient was taken the recovery room in stable condition.    Godfrey Lawrence Jr., MD     Date: 8/2/2022  Time: 16:36 EDT

## 2022-08-02 NOTE — ANESTHESIA PROCEDURE NOTES
Airway  Urgency: elective    Date/Time: 8/2/2022 1:27 PM  Airway not difficult    General Information and Staff    Patient location during procedure: OR  CRNA/CAA: Ambrose Barton, CRNA    Indications and Patient Condition  Indications for airway management: airway protection    Preoxygenated: yes  MILS not maintained throughout  Mask difficulty assessment: 1 - vent by mask    Final Airway Details  Final airway type: endotracheal airway      Successful airway: ETT  Cuffed: yes   Successful intubation technique: direct laryngoscopy  Facilitating devices/methods: intubating stylet  Endotracheal tube insertion site: oral  Blade: Ursula  Blade size: 3  ETT size (mm): 8.0  Cormack-Lehane Classification: grade I - full view of glottis  Placement verified by: chest auscultation and capnometry   Measured from: lips  ETT/EBT  to lips (cm): 22  Number of attempts at approach: 1  Assessment: lips, teeth, and gum same as pre-op and atraumatic intubation    Additional Comments  Negative epigastric sounds, Breath sound equal bilaterally with symmetric chest rise and fall

## 2022-08-02 NOTE — ANESTHESIA PREPROCEDURE EVALUATION
Anesthesia Evaluation     Patient summary reviewed and Nursing notes reviewed   NPO Solid Status: > 8 hours  NPO Liquid Status: > 2 hours           Airway   Mallampati: II  TM distance: >3 FB  Neck ROM: full  No difficulty expected  Dental    (+) upper dentures    Pulmonary - normal exam   (+) a smoker Current, COPD,   Cardiovascular - normal exam    (+) hypertension, hyperlipidemia,   (-) dysrhythmias, angina      Neuro/Psych- negative ROS  GI/Hepatic/Renal/Endo    (+)  GERD well controlled,    (-) liver disease, no renal disease, diabetes, no thyroid disorder    Musculoskeletal     (+) back pain,   Abdominal    Substance History      OB/GYN          Other   arthritis,    history of cancer (bladder cancer)                    Anesthesia Plan    ASA 3     general with block and ERAS Protocol     intravenous induction     Anesthetic plan, risks, benefits, and alternatives have been provided, discussed and informed consent has been obtained with: patient.    Plan discussed with CRNA.        CODE STATUS:

## 2022-08-02 NOTE — ANESTHESIA PROCEDURE NOTES

## 2022-08-02 NOTE — ANESTHESIA POSTPROCEDURE EVALUATION
Patient: Tru Buitrago    Procedure Summary     Date: 08/02/22 Room / Location:  [facility] OR  /  [facility]    Anesthesia Start: 1322 Anesthesia Stop: 1715    Procedure: CYSTO-PROSTATECTOMY, PELVIC LYMPH NODE DISSECTION LAPAROSCOPIC WITH DAVINCI ROBOT, CREATION OF ILEAL CONDUIT (N/A Abdomen) Diagnosis:     Surgeons: Godfrey Lawrence Jr., MD Provider: Joi Dejesus MD    Anesthesia Type: general with block, ERAS Protocol ASA Status: 3          Anesthesia Type: general with block, ERAS Protocol    Vitals  Vitals Value Taken Time   BP     Temp     Pulse     Resp     SpO2 92 % 08/02/22 1715           Post Anesthesia Care and Evaluation    Patient location during evaluation: PACU  Patient participation: complete - patient participated  Level of consciousness: awake and alert  Pain management: adequate    Airway patency: patent  Anesthetic complications: No anesthetic complications  PONV Status: none  Cardiovascular status: hemodynamically stable and acceptable  Respiratory status: nonlabored ventilation, acceptable and nasal cannula  Hydration status: acceptable

## 2022-08-02 NOTE — H&P
Admission HP     Patient Name: Tru Buitrago  MRN: [id number]  : 1955  DOS: 2022    Attending: Godfrey Lawrence Jr*    Primary Care Provider: Suresh Howard APRN      Patient Care Team:  Suresh Howard APRN as PCP - General (Family Medicine)  Godfrey Lawrence Jr., MD as Referring Physician (Urology)  Kev Cobb MD as Consulting Physician (Radiation Oncology)    Chief complaint:  Bladder cancer    Subjective   Patient is a pleasant 66 y.o. male presented for scheduled surgery by Dr. Melinda العلي. He anticipates CYSTO-PROSTATECTOMY, PELVIC LYMPH NODE DISSECTION LAPAROSCOPIC WITH DAVINCI ROBOT, CREATION OF ILEAL CONDUIT today. He started having gross hematuria with urinary retention and passing clots about 2 months ago. He underwent a cystoscopy and was found to have a posterior bladder wall tumor that was obstructing the right ureter. Pathology shower urothelial carcinoma. He denies any other urinary changes.     When seen in pre-op, patient is resting comfortably in bed with significant other at bedside. He denies pain, nausea, sob or chest pain.       Allergies:  Allergies   Allergen Reactions   • Food Color Orange [Yellow Dye] Nausea Only   • Tetanus Toxoids Swelling       Meds:  Medications Prior to Admission   Medication Sig Dispense Refill Last Dose   • Anoro Ellipta 62.5-25 MCG/INH aerosol powder  inhaler Inhale 1 puff Daily.   2022 at Unknown time   • atorvastatin (LIPITOR) 20 MG tablet Take 20 mg by mouth Daily.   2022 at Unknown time   • famotidine (PEPCID) 40 MG tablet Take 40 mg by mouth 2 (Two) Times a Day.   2022 at Unknown time   • fexofenadine (ALLEGRA) 180 MG tablet Take 180 mg by mouth Daily.   2022 at Unknown time   • gabapentin (NEURONTIN) 600 MG tablet Take 600 mg by mouth 3 (Three) Times a Day.   2022 at Unknown time   • HYDROcodone-acetaminophen (NORCO)  MG per tablet Take 1 tablet by mouth Every 8 (Eight)  "Hours As Needed for Moderate Pain .   8/2/2022 at Unknown time   • losartan (COZAAR) 50 MG tablet Take 50 mg by mouth Daily.   8/1/2022 at Unknown time   • montelukast (SINGULAIR) 10 MG tablet Take 10 mg by mouth Every Night.   8/1/2022 at Unknown time   • multivitamin with minerals tablet tablet Take 1 tablet by mouth Daily.   7/26/2022 at Unknown time   • tiZANidine (ZANAFLEX) 4 MG tablet Take 4 mg by mouth Every 8 (Eight) Hours As Needed.   8/2/2022 at Unknown time   • verapamil ER (VERELAN) 240 MG 24 hr capsule Take 240 mg by mouth Every Night.   8/1/2022 at Unknown time   • albuterol sulfate  (90 Base) MCG/ACT inhaler Inhale 2 puffs Every 4 (Four) Hours As Needed for Wheezing.   7/29/2022   • aspirin 325 MG tablet Take 325 mg by mouth Daily.   More than a month at Unknown time   • nicotine (NICODERM CQ) 14 MG/24HR patch Place 1 patch on the skin as directed by provider Daily.            History:   Past Medical History:   Diagnosis Date   • Bladder cancer (HCC)    • COPD (chronic obstructive pulmonary disease) (HCC)    • GERD (gastroesophageal reflux disease)    • Hyperlipidemia    • Hypertension      Past Surgical History:   Procedure Laterality Date   • COLONOSCOPY      2018   • CYSTOSCOPY BLADDER BIOPSY       Family History   Problem Relation Age of Onset   • Cancer Brother    • Throat cancer Brother      Social History     Tobacco Use   • Smoking status: Current Every Day Smoker     Packs/day: 1.00     Years: 50.00     Pack years: 50.00     Types: Cigarettes   • Smokeless tobacco: Never Used   Vaping Use   • Vaping Use: Never used   Substance Use Topics   • Alcohol use: Yes     Comment: occ   • Drug use: Not Currently       Review of Systems  Pertinent items are noted in HPI, all other systems reviewed and negative    Vital Signs  /94 (BP Location: Right arm, Patient Position: Sitting)   Pulse 92   Temp 97.5 °F (36.4 °C) (Temporal)   Resp 18   Ht 182.9 cm (72\")   Wt 122 kg (268 lb)   SpO2 " 94%   BMI 36.35 kg/m²     Physical Exam:    General Appearance:    Alert, cooperative, in no acute distress   Head:    Normocephalic, without obvious abnormality, atraumatic   Eyes:            Lids and lashes normal, conjunctivae and sclerae normal, no   icterus, no pallor, corneas clear    Ears:    Ears appear intact with no abnormalities noted   Throat:   No oral lesions, no thrush, oral mucosa moist   Neck:   No adenopathy, supple, trachea midline, no thyromegaly         Lungs:     Clear to auscultation,respirations regular, even and      unlabored, no wheezes or rales.    Heart:    Regular rhythm and normal rate, normal S1 and S2, no   murmur, no gallop    Abdomen:    Soft, nontender, benign exam   Genitalia:    Deferred   Extremities:   Moves all extremities well, no edema, no cyanosis, no          redness   Pulses:   Pulses palpable and equal bilaterally   Skin:   No bleeding, bruising or rash          Results from last 7 days   Lab Units 08/01/22  1544   WBC 10*3/mm3 6.48   HEMOGLOBIN g/dL 13.5   HEMATOCRIT % 40.0   PLATELETS 10*3/mm3 248     Results from last 7 days   Lab Units 08/01/22  1544   SODIUM mmol/L 139   POTASSIUM mmol/L 4.4   CHLORIDE mmol/L 104   CO2 mmol/L 26.0   BUN mg/dL 10   CREATININE mg/dL 1.54*   CALCIUM mg/dL 9.6   BILIRUBIN mg/dL 0.3   ALK PHOS U/L 88   ALT (SGPT) U/L 11   AST (SGOT) U/L 12   GLUCOSE mg/dL 100*     No results found for: HGBA1C    Assessment and Plan:       Malignant neoplasm of posterior wall of urinary bladder (HCC)    COPD (chronic obstructive pulmonary disease) (HCC)    GERD (gastroesophageal reflux disease)    HTN (hypertension)    HLD (hyperlipidemia)    Smoker    Renal insufficiency      Plan:    Admitted for further management.      1. Ambulation, encouraged , starting today  2. Pain control-prns   3. IS-encourage  4. DVT proph- Mechanicals and Lovenox SQ to start POD1.  5. Bowel regimen  6. Resume home medications as appropriate.  7. Monitor post-op labs and  path.  8. Clear liquid diet, advance as tolerated with return of bowel function  9.Monitor output from JUDD drain, aim to remove prior to hospital discharge.  10.Discharge planning.     COPD  -continue home inhalers  -monitor O2 sats    Smoker  -Nicotine patch, patient requested 14mg patch as 21mg makes him sick    HTN, Hyperlipidemia  - Continue home losartan, statin  - Monitor BP   - Holding parameters for BP meds  - Labetalol PRN for SBP>170    GERD  -pepcid    RI  -avoid nephrotoxic agents  -BMP in a.m.       Dragon disclaimer:  Part of this encounter note is an electronic transcription/translation of spoken language to printed text. The electronic translation of spoken language may permit erroneous, or at times, nonsensical words or phrases to be inadvertently transcribed; Although I have reviewed the note for such errors, some may still exist.      Betsy Hall, APRN  08/02/22  15:04 EDT

## 2022-08-03 PROBLEM — R11.0 POSTOPERATIVE NAUSEA: Status: ACTIVE | Noted: 2022-08-03

## 2022-08-03 PROBLEM — Z98.890 POSTOPERATIVE NAUSEA: Status: ACTIVE | Noted: 2022-08-03

## 2022-08-03 PROBLEM — D62 ACUTE BLOOD LOSS ANEMIA: Status: ACTIVE | Noted: 2022-08-03

## 2022-08-03 PROBLEM — G89.18 ACUTE POSTOPERATIVE PAIN: Status: ACTIVE | Noted: 2022-08-03

## 2022-08-03 PROBLEM — Z90.79 S/P PROSTATECTOMY: Status: ACTIVE | Noted: 2022-08-03

## 2022-08-03 PROBLEM — D72.829 LEUKOCYTOSIS: Status: ACTIVE | Noted: 2022-08-03

## 2022-08-03 LAB
ANION GAP SERPL CALCULATED.3IONS-SCNC: 10 MMOL/L (ref 5–15)
BUN SERPL-MCNC: 17 MG/DL (ref 8–23)
BUN/CREAT SERPL: 11.4 (ref 7–25)
CALCIUM SPEC-SCNC: 8.8 MG/DL (ref 8.6–10.5)
CHLORIDE SERPL-SCNC: 103 MMOL/L (ref 98–107)
CO2 SERPL-SCNC: 23 MMOL/L (ref 22–29)
CREAT SERPL-MCNC: 1.49 MG/DL (ref 0.76–1.27)
DEPRECATED RDW RBC AUTO: 45.4 FL (ref 37–54)
EGFRCR SERPLBLD CKD-EPI 2021: 52.1 ML/MIN/1.73
ERYTHROCYTE [DISTWIDTH] IN BLOOD BY AUTOMATED COUNT: 14.4 % (ref 12.3–15.4)
GLUCOSE SERPL-MCNC: 143 MG/DL (ref 65–99)
HCT VFR BLD AUTO: 32 % (ref 37.5–51)
HGB BLD-MCNC: 10.8 G/DL (ref 13–17.7)
MCH RBC QN AUTO: 29.4 PG (ref 26.6–33)
MCHC RBC AUTO-ENTMCNC: 33.8 G/DL (ref 31.5–35.7)
MCV RBC AUTO: 87.2 FL (ref 79–97)
PLATELET # BLD AUTO: 294 10*3/MM3 (ref 140–450)
PMV BLD AUTO: 9.5 FL (ref 6–12)
POTASSIUM SERPL-SCNC: 4.8 MMOL/L (ref 3.5–5.2)
RBC # BLD AUTO: 3.67 10*6/MM3 (ref 4.14–5.8)
SODIUM SERPL-SCNC: 136 MMOL/L (ref 136–145)
WBC NRBC COR # BLD: 12.94 10*3/MM3 (ref 3.4–10.8)

## 2022-08-03 PROCEDURE — 25010000002 PROCHLORPERAZINE 10 MG/2ML SOLUTION: Performed by: NURSE PRACTITIONER

## 2022-08-03 PROCEDURE — 25010000002 SODIUM CHLORIDE 0.9 % WITH KCL 20 MEQ 20-0.9 MEQ/L-% SOLUTION: Performed by: UROLOGY

## 2022-08-03 PROCEDURE — 25010000002 ONDANSETRON PER 1 MG: Performed by: UROLOGY

## 2022-08-03 PROCEDURE — 94799 UNLISTED PULMONARY SVC/PX: CPT

## 2022-08-03 PROCEDURE — 25010000002 ENOXAPARIN PER 10 MG: Performed by: UROLOGY

## 2022-08-03 PROCEDURE — 25010000002 HYDROMORPHONE PER 4 MG: Performed by: UROLOGY

## 2022-08-03 PROCEDURE — 85027 COMPLETE CBC AUTOMATED: CPT | Performed by: UROLOGY

## 2022-08-03 PROCEDURE — 25010000002 CEFOXITIN PER 1 G: Performed by: UROLOGY

## 2022-08-03 PROCEDURE — 25010000002 ONDANSETRON PER 1 MG

## 2022-08-03 PROCEDURE — 80048 BASIC METABOLIC PNL TOTAL CA: CPT | Performed by: UROLOGY

## 2022-08-03 RX ORDER — PROCHLORPERAZINE EDISYLATE 5 MG/ML
5 INJECTION INTRAMUSCULAR; INTRAVENOUS EVERY 6 HOURS PRN
Status: DISCONTINUED | OUTPATIENT
Start: 2022-08-03 | End: 2022-08-07 | Stop reason: HOSPADM

## 2022-08-03 RX ORDER — MONTELUKAST SODIUM 10 MG/1
10 TABLET ORAL NIGHTLY
Status: DISCONTINUED | OUTPATIENT
Start: 2022-08-03 | End: 2022-08-05

## 2022-08-03 RX ORDER — ACETAMINOPHEN 160 MG/5ML
650 SOLUTION ORAL EVERY 6 HOURS
Status: COMPLETED | OUTPATIENT
Start: 2022-08-03 | End: 2022-08-04

## 2022-08-03 RX ORDER — PROCHLORPERAZINE MALEATE 5 MG/1
5 TABLET ORAL EVERY 6 HOURS PRN
Status: DISCONTINUED | OUTPATIENT
Start: 2022-08-03 | End: 2022-08-07 | Stop reason: HOSPADM

## 2022-08-03 RX ORDER — CETIRIZINE HYDROCHLORIDE 10 MG/1
10 TABLET ORAL DAILY
Status: DISCONTINUED | OUTPATIENT
Start: 2022-08-03 | End: 2022-08-05

## 2022-08-03 RX ORDER — ONDANSETRON 2 MG/ML
4 INJECTION INTRAMUSCULAR; INTRAVENOUS EVERY 6 HOURS PRN
Status: DISCONTINUED | OUTPATIENT
Start: 2022-08-03 | End: 2022-08-05

## 2022-08-03 RX ORDER — OXYCODONE HYDROCHLORIDE 5 MG/1
5 TABLET ORAL EVERY 4 HOURS PRN
Status: DISCONTINUED | OUTPATIENT
Start: 2022-08-03 | End: 2022-08-05

## 2022-08-03 RX ORDER — ONDANSETRON 4 MG/1
4 TABLET, FILM COATED ORAL EVERY 6 HOURS PRN
Status: DISCONTINUED | OUTPATIENT
Start: 2022-08-03 | End: 2022-08-05

## 2022-08-03 RX ORDER — PROCHLORPERAZINE 25 MG
25 SUPPOSITORY, RECTAL RECTAL EVERY 12 HOURS PRN
Status: DISCONTINUED | OUTPATIENT
Start: 2022-08-03 | End: 2022-08-07 | Stop reason: HOSPADM

## 2022-08-03 RX ORDER — PANTOPRAZOLE SODIUM 40 MG/10ML
40 INJECTION, POWDER, LYOPHILIZED, FOR SOLUTION INTRAVENOUS
Status: DISCONTINUED | OUTPATIENT
Start: 2022-08-03 | End: 2022-08-05

## 2022-08-03 RX ORDER — ACETAMINOPHEN 325 MG/1
650 TABLET ORAL EVERY 6 HOURS
Status: COMPLETED | OUTPATIENT
Start: 2022-08-03 | End: 2022-08-04

## 2022-08-03 RX ORDER — DOCUSATE SODIUM 50 MG/5 ML
100 LIQUID (ML) ORAL 2 TIMES DAILY PRN
Status: DISCONTINUED | OUTPATIENT
Start: 2022-08-03 | End: 2022-08-05

## 2022-08-03 RX ORDER — ATORVASTATIN CALCIUM 20 MG/1
20 TABLET, FILM COATED ORAL NIGHTLY
Status: DISCONTINUED | OUTPATIENT
Start: 2022-08-03 | End: 2022-08-05

## 2022-08-03 RX ORDER — ACETAMINOPHEN 650 MG/1
650 SUPPOSITORY RECTAL EVERY 6 HOURS
Status: COMPLETED | OUTPATIENT
Start: 2022-08-03 | End: 2022-08-04

## 2022-08-03 RX ORDER — TIZANIDINE 4 MG/1
4 TABLET ORAL EVERY 8 HOURS PRN
Status: DISCONTINUED | OUTPATIENT
Start: 2022-08-03 | End: 2022-08-05

## 2022-08-03 RX ORDER — VERAPAMIL HYDROCHLORIDE 80 MG/1
80 TABLET ORAL EVERY 8 HOURS SCHEDULED
Status: DISCONTINUED | OUTPATIENT
Start: 2022-08-03 | End: 2022-08-05

## 2022-08-03 RX ORDER — LOSARTAN POTASSIUM 50 MG/1
50 TABLET ORAL DAILY
Status: DISCONTINUED | OUTPATIENT
Start: 2022-08-04 | End: 2022-08-05

## 2022-08-03 RX ORDER — GABAPENTIN 250 MG/5ML
300 SOLUTION ORAL EVERY 12 HOURS SCHEDULED
Status: DISCONTINUED | OUTPATIENT
Start: 2022-08-03 | End: 2022-08-05

## 2022-08-03 RX ADMIN — ACETAMINOPHEN 650 MG: 325 TABLET ORAL at 14:29

## 2022-08-03 RX ADMIN — PROCHLORPERAZINE EDISYLATE 5 MG: 5 INJECTION INTRAMUSCULAR; INTRAVENOUS at 11:58

## 2022-08-03 RX ADMIN — HYDROMORPHONE HYDROCHLORIDE 0.5 MG: 2 INJECTION, SOLUTION INTRAMUSCULAR; INTRAVENOUS; SUBCUTANEOUS at 00:17

## 2022-08-03 RX ADMIN — PANTOPRAZOLE SODIUM 40 MG: 40 TABLET, DELAYED RELEASE ORAL at 05:14

## 2022-08-03 RX ADMIN — IPRATROPIUM BROMIDE AND ALBUTEROL SULFATE 3 ML: .5; 3 SOLUTION RESPIRATORY (INHALATION) at 14:36

## 2022-08-03 RX ADMIN — CEFOXITIN SODIUM 1 G: 1 POWDER, FOR SOLUTION INTRAVENOUS at 16:00

## 2022-08-03 RX ADMIN — HYDROMORPHONE HYDROCHLORIDE 0.5 MG: 2 INJECTION, SOLUTION INTRAMUSCULAR; INTRAVENOUS; SUBCUTANEOUS at 20:09

## 2022-08-03 RX ADMIN — ONDANSETRON 4 MG: 2 INJECTION INTRAMUSCULAR; INTRAVENOUS at 11:00

## 2022-08-03 RX ADMIN — PANTOPRAZOLE SODIUM 40 MG: 40 INJECTION, POWDER, FOR SOLUTION INTRAVENOUS at 09:38

## 2022-08-03 RX ADMIN — GABAPENTIN 300 MG: 250 SOLUTION ORAL at 22:27

## 2022-08-03 RX ADMIN — IPRATROPIUM BROMIDE AND ALBUTEROL SULFATE 3 ML: .5; 3 SOLUTION RESPIRATORY (INHALATION) at 23:09

## 2022-08-03 RX ADMIN — HYDROMORPHONE HYDROCHLORIDE 0.5 MG: 2 INJECTION, SOLUTION INTRAMUSCULAR; INTRAVENOUS; SUBCUTANEOUS at 14:29

## 2022-08-03 RX ADMIN — POTASSIUM CHLORIDE AND SODIUM CHLORIDE 100 ML/HR: 900; 150 INJECTION, SOLUTION INTRAVENOUS at 16:00

## 2022-08-03 RX ADMIN — ATORVASTATIN CALCIUM 20 MG: 20 TABLET, FILM COATED ORAL at 22:28

## 2022-08-03 RX ADMIN — ACETAMINOPHEN 649.6 MG: 650 SOLUTION ORAL at 22:28

## 2022-08-03 RX ADMIN — ENOXAPARIN SODIUM 40 MG: 40 INJECTION SUBCUTANEOUS at 08:39

## 2022-08-03 RX ADMIN — CEFOXITIN SODIUM 1 G: 1 POWDER, FOR SOLUTION INTRAVENOUS at 08:39

## 2022-08-03 RX ADMIN — OXYCODONE 5 MG: 5 TABLET ORAL at 05:14

## 2022-08-03 RX ADMIN — ACETAMINOPHEN 649.6 MG: 650 SOLUTION ORAL at 10:15

## 2022-08-03 RX ADMIN — HYDROMORPHONE HYDROCHLORIDE 0.5 MG: 2 INJECTION, SOLUTION INTRAMUSCULAR; INTRAVENOUS; SUBCUTANEOUS at 11:58

## 2022-08-03 RX ADMIN — ONDANSETRON 4 MG: 2 INJECTION INTRAMUSCULAR; INTRAVENOUS at 05:14

## 2022-08-03 RX ADMIN — MONTELUKAST 10 MG: 10 TABLET, FILM COATED ORAL at 22:29

## 2022-08-03 RX ADMIN — ACETAMINOPHEN 650 MG: 325 TABLET ORAL at 01:40

## 2022-08-03 RX ADMIN — NICOTINE 1 PATCH: 14 PATCH, EXTENDED RELEASE TRANSDERMAL at 22:28

## 2022-08-03 RX ADMIN — PROCHLORPERAZINE EDISYLATE 5 MG: 5 INJECTION INTRAMUSCULAR; INTRAVENOUS at 20:09

## 2022-08-03 RX ADMIN — GABAPENTIN 300 MG: 250 SOLUTION ORAL at 10:14

## 2022-08-03 RX ADMIN — CETIRIZINE HYDROCHLORIDE TABLETS 10 MG: 10 TABLET, FILM COATED ORAL at 09:38

## 2022-08-03 RX ADMIN — POTASSIUM CHLORIDE AND SODIUM CHLORIDE 100 ML/HR: 900; 150 INJECTION, SOLUTION INTRAVENOUS at 05:28

## 2022-08-03 RX ADMIN — IPRATROPIUM BROMIDE AND ALBUTEROL SULFATE 3 ML: .5; 3 SOLUTION RESPIRATORY (INHALATION) at 07:09

## 2022-08-03 RX ADMIN — VERAPAMIL HYDROCHLORIDE 80 MG: 80 TABLET ORAL at 22:28

## 2022-08-03 RX ADMIN — HYDROMORPHONE HYDROCHLORIDE 0.5 MG: 2 INJECTION, SOLUTION INTRAMUSCULAR; INTRAVENOUS; SUBCUTANEOUS at 08:39

## 2022-08-03 RX ADMIN — VERAPAMIL HYDROCHLORIDE 80 MG: 80 TABLET ORAL at 14:29

## 2022-08-03 NOTE — NURSING NOTE
Woc follow up for ostomy education    Surgery date: 08/02/2022    Ostomy type: Ileal conduit    Appliance in place: [place] 2 piece 2 and three-quarter inch    Last pouch change: 08/03/2022    Stoma Assessment: Stoma is protruding above skin level red 35 mm in diameter.  Stents protruding about 8 cm 8 to 10 cm.  Stents working well.  Patient's belly is distended and round Woc was able to get a flat appliance on.    Education performed: Patient Woc through pouch change as patient has had very a lot of leaking around the appliance today.  Woc feels that this may be more related to sweat in belly and bleeding from all the drains and tubes rather than actual leakage from the urine.    Resource folder reviewed supplies dropped off given pouch changed patient verbalized understanding of all aspects of the pouch change.  We will continue to teach.    Appliance put to bedside drainage bag.    Woc will continue to follow.     Please contact with questions or concerns.

## 2022-08-03 NOTE — CASE MANAGEMENT/SOCIAL WORK
Discharge Planning Assessment  [facility]     Patient Name: Tru Buitrago  MRN: [id number]  Today's Date: 8/3/2022    Admit Date: 8/2/2022     Discharge Needs Assessment     Row Name 08/03/22 1539       Living Environment    People in Home significant other    Name(s) of People in Home Warren @ [phone]    Current Living Arrangements home    Primary Care Provided by self    Provides Primary Care For no one    Family Caregiver if Needed significant other    Quality of Family Relationships helpful;involved;supportive    Able to Return to Prior Arrangements yes       Resource/Environmental Concerns    Transportation Concerns none       Transition Planning    Patient/Family Anticipates Transition to home with family    Patient/Family Anticipated Services at Transition none    Transportation Anticipated family or friend will provide       Discharge Needs Assessment    Equipment Currently Used at Home none    Equipment Needed After Discharge none    Discharge Coordination/Progress plan is home               Discharge Plan     Row Name 08/03/22 1535       Plan    Plan home    Patient/Family in Agreement with Plan yes    Plan Comments I talked with patient and SO Warren at . Patient lives with Warren in [facility]. Independent PTA, no dme, no HH or Home 02. Patient works FT. Plan is home and Warren will be there to help as needed.    Final Discharge Disposition Code 01 - home or self-care              Continued Care and Services - Admitted Since 8/2/2022    Coordination has not been started for this encounter.          Demographic Summary     Row Name 08/03/22 1538       General Information    Admission Type inpatient    Referral Source admission list    Reason for Consult discharge planning    Preferred Language English       Contact Information    Permission Granted to Share Info With     Contact Information Obtained for     Contact Information Comments PCP: Suresh Howard, glendy                Functional Status     Row Name 08/03/22 1538       Functional Status    Usual Activity Tolerance good    Current Activity Tolerance fair       Functional Status, IADL    Medications independent    Meal Preparation independent    Housekeeping independent    Laundry independent    Shopping independent       Mental Status    General Appearance WDL WDL       Mental Status Summary    Recent Changes in Mental Status/Cognitive Functioning ability to speak clearly;ability to understand       Employment/    Employment Status employed full-time               Psychosocial    No documentation.                Abuse/Neglect    No documentation.                Legal    No documentation.                Substance Abuse    No documentation.                Patient Forms    No documentation.                   Carrol Kapadia RN

## 2022-08-03 NOTE — PROGRESS NOTES
"IM progress note      Tru Buitrago  [id number]  1955     LOS: 1 day     Attending: Godfrey Lawrence Jr*    Primary Care Provider: Suresh Howard APRN      Chief Complaint/Reason for visit:  Abd pain    Subjective   Doing okay.  Adequate pain control.  Had increased nausea and diaphoresis after ambulating the halls earlier.  No flatus or BM.  NG tube remains to low wall suction. Denies f/c/sob/cp.    Objective     Vital Signs    Visit Vitals  /80 (BP Location: Left arm, Patient Position: Sitting)   Pulse 86   Temp 98.8 °F (37.1 °C) (Axillary)   Resp 18   Ht 182.9 cm (72\")   Wt 122 kg (268 lb)   SpO2 92%   BMI 36.35 kg/m²     Temp (24hrs), Av.8 °F (36.6 °C), Min:97 °F (36.1 °C), Max:98.8 °F (37.1 °C)      Nutrition: NPO x ice chips    Respiratory: per NC, wean as able    Physical Exam:     General Appearance:    Alert, cooperative, in no acute distress   Head:    Normocephalic, without obvious abnormality, atraumatic    Lungs:     Normal effort, symmetric chest rise, no crepitus, clear to      auscultation bilaterally             Heart:    Regular rhythm and normal rate, normal S1 and S2   Abdomen:    Soft, expected tenderness.  Clean incisions.  Ileal conduit draining clear yellow urine   Extremities:   No clubbing, cyanosis or edema.  No deformities.    Pulses:   Pulses palpable and equal bilaterally   Skin:   No bleeding, bruising or rash   Neurologic:   Moves all extremities with no obvious focal motor deficit.  Cranial nerves 2 - 12 grossly intact     Results Review:     I reviewed the patient's new clinical results.   Results from last 7 days   Lab Units 22  0823 22  1544   WBC 10*3/mm3 12.94* 6.48   HEMOGLOBIN g/dL 10.8* 13.5   HEMATOCRIT % 32.0* 40.0   PLATELETS 10*3/mm3 294 248     Results from last 7 days   Lab Units 22  0823 22  1544   SODIUM mmol/L 136 139   POTASSIUM mmol/L 4.8 4.4   CHLORIDE mmol/L 103 104   CO2 mmol/L 23.0 26.0   BUN mg/dL 17 " 10   CREATININE mg/dL 1.49* 1.54*   CALCIUM mg/dL 8.8 9.6   BILIRUBIN mg/dL  --  0.3   ALK PHOS U/L  --  88   ALT (SGPT) U/L  --  11   AST (SGOT) U/L  --  12   GLUCOSE mg/dL 143* 100*     I reviewed the patient's new imaging including images and reports.    All medications reviewed.   acetaminophen, 650 mg, Nasogastric, Q6H   Or  acetaminophen, 650 mg, Nasogastric, Q6H   Or  acetaminophen, 650 mg, Rectal, Q6H  atorvastatin, 20 mg, Nasogastric, Nightly  ceFOXitin, 1 g, Intravenous, Q8H  cetirizine, 10 mg, Nasogastric, Daily  enoxaparin, 40 mg, Subcutaneous, Daily  gabapentin, 300 mg, Oral, Q12H  ipratropium-albuterol, 3 mL, Nebulization, Q8H - RT  [START ON 8/4/2022] losartan, 50 mg, Nasogastric, Daily  montelukast, 10 mg, Nasogastric, Nightly  nicotine, 1 patch, Transdermal, Q24H  pantoprazole, 40 mg, Intravenous, Q AM  verapamil, 80 mg, Nasogastric, Q8H        Assessment & Plan     S/P cysto-prostatectomy with ileal conduit     Malignant neoplasm of posterior wall of urinary bladder (HCC)    COPD (chronic obstructive pulmonary disease) (HCC)    GERD (gastroesophageal reflux disease)    HTN (hypertension)    HLD (hyperlipidemia)    Smoker    Renal insufficiency    Acute postoperative pain    Leukocytosis, mild, likely reactive    Acute blood loss anemia, asymptomatic    Postoperative nausea      Plan  1. Ambulation  2. Pain control-prns   3. IS-encouraged  4. DVT proph- mechs/Lovenox  5. Bowel regimen  6.  N.p.o. except ice chips.  Continue IVF  7. Monitor post-op labs  8. DC planning for home     Postop nausea  - NGT to LWS  - antiemetics PRN    COPD  -continue home inhalers  -monitor O2 sats     Smoker  -Nicotine patch, patient requested 14mg patch as 21mg makes him sick     HTN, Hyperlipidemia  - Continue home losartan, statin  - Monitor BP   - Holding parameters for BP meds  - Labetalol PRN for SBP>170     GERD  -pepcid     RI  -avoid nephrotoxic agents      ALLEN Mccoy  08/03/22  12:41 EDT

## 2022-08-03 NOTE — PROGRESS NOTES
Patient is doing well this morning  Pain well controlled  No flatus    Abdomen is benign  Urostomy looks good  Leakage at urostomy appliance    Appropriate NG tube and JUDD drain output      Wound ostomy nurse consult for ostomy management  Continue NG tube for now as we await bowel function  Ambulate

## 2022-08-03 NOTE — NURSING NOTE
Wocn consulted for pre-op stoma site marking.     Surgeon: Godfrey Michelle Jr.    Ostomy type: Ileal conduit    Patient assessed, lying, sitting, leaning forward.    Quadrant marked: Right medial quadrant 3 fingers from the umbilicus, right upper quadrant.  Right medial quadrant marked as the #1 quadrant as the right lower quadrant is underneath the patient's line of sight.  Could also interfere with patient's belt line.    Education provided: Discussion of what the anatomy of an ileal conduit is expectations and bag change and reassurance of Woc visit for education and teaching.    Woc will follow.     Please contact with questions or concerns.

## 2022-08-03 NOTE — PLAN OF CARE
Problem: Adult Inpatient Plan of Care  Goal: Plan of Care Review  Outcome: Ongoing, Progressing  Flowsheets (Taken 8/3/2022 0314)  Outcome Evaluation: VSS. 5LNC humidified. PRN's managing pain. Significant output from JUDD drain. Ileal conduit in place. Pt slightly drowsy after surgery. No other concerns at this time. Will continue with the plan of care.   Goal Outcome Evaluation:              Outcome Evaluation: VSS. 5LNC humidified. PRN's managing pain. Significant output from JUDD drain. Ileal conduit in place. Pt slightly drowsy after surgery. No other concerns at this time. Will continue with the plan of care.

## 2022-08-04 PROCEDURE — 94664 DEMO&/EVAL PT USE INHALER: CPT

## 2022-08-04 PROCEDURE — 25010000002 ONDANSETRON PER 1 MG

## 2022-08-04 PROCEDURE — 94761 N-INVAS EAR/PLS OXIMETRY MLT: CPT

## 2022-08-04 PROCEDURE — 25010000002 ENOXAPARIN PER 10 MG: Performed by: UROLOGY

## 2022-08-04 PROCEDURE — 94799 UNLISTED PULMONARY SVC/PX: CPT

## 2022-08-04 PROCEDURE — 25010000002 PROCHLORPERAZINE 10 MG/2ML SOLUTION: Performed by: NURSE PRACTITIONER

## 2022-08-04 PROCEDURE — 25010000002 SODIUM CHLORIDE 0.9 % WITH KCL 20 MEQ 20-0.9 MEQ/L-% SOLUTION: Performed by: UROLOGY

## 2022-08-04 PROCEDURE — 25010000002 HYDROMORPHONE PER 4 MG: Performed by: UROLOGY

## 2022-08-04 RX ADMIN — PROCHLORPERAZINE EDISYLATE 5 MG: 5 INJECTION INTRAMUSCULAR; INTRAVENOUS at 12:59

## 2022-08-04 RX ADMIN — ACETAMINOPHEN 650 MG: 650 SOLUTION ORAL at 09:22

## 2022-08-04 RX ADMIN — PROCHLORPERAZINE EDISYLATE 5 MG: 5 INJECTION INTRAMUSCULAR; INTRAVENOUS at 19:18

## 2022-08-04 RX ADMIN — HYDROMORPHONE HYDROCHLORIDE 0.5 MG: 2 INJECTION, SOLUTION INTRAMUSCULAR; INTRAVENOUS; SUBCUTANEOUS at 09:38

## 2022-08-04 RX ADMIN — HYDROMORPHONE HYDROCHLORIDE 0.5 MG: 2 INJECTION, SOLUTION INTRAMUSCULAR; INTRAVENOUS; SUBCUTANEOUS at 00:27

## 2022-08-04 RX ADMIN — POTASSIUM CHLORIDE AND SODIUM CHLORIDE 100 ML/HR: 900; 150 INJECTION, SOLUTION INTRAVENOUS at 23:15

## 2022-08-04 RX ADMIN — ACETAMINOPHEN 650 MG: 650 SOLUTION ORAL at 14:04

## 2022-08-04 RX ADMIN — IPRATROPIUM BROMIDE AND ALBUTEROL SULFATE 3 ML: .5; 3 SOLUTION RESPIRATORY (INHALATION) at 15:57

## 2022-08-04 RX ADMIN — VERAPAMIL HYDROCHLORIDE 80 MG: 80 TABLET ORAL at 14:04

## 2022-08-04 RX ADMIN — POTASSIUM CHLORIDE AND SODIUM CHLORIDE 100 ML/HR: 900; 150 INJECTION, SOLUTION INTRAVENOUS at 03:18

## 2022-08-04 RX ADMIN — MONTELUKAST 10 MG: 10 TABLET, FILM COATED ORAL at 23:15

## 2022-08-04 RX ADMIN — HYDROMORPHONE HYDROCHLORIDE 0.5 MG: 2 INJECTION, SOLUTION INTRAMUSCULAR; INTRAVENOUS; SUBCUTANEOUS at 06:39

## 2022-08-04 RX ADMIN — ONDANSETRON 4 MG: 2 INJECTION INTRAMUSCULAR; INTRAVENOUS at 22:44

## 2022-08-04 RX ADMIN — GABAPENTIN 300 MG: 250 SOLUTION ORAL at 23:15

## 2022-08-04 RX ADMIN — LOSARTAN POTASSIUM 50 MG: 50 TABLET, FILM COATED ORAL at 09:22

## 2022-08-04 RX ADMIN — PANTOPRAZOLE SODIUM 40 MG: 40 INJECTION, POWDER, FOR SOLUTION INTRAVENOUS at 06:39

## 2022-08-04 RX ADMIN — PROCHLORPERAZINE EDISYLATE 5 MG: 5 INJECTION INTRAMUSCULAR; INTRAVENOUS at 06:39

## 2022-08-04 RX ADMIN — CETIRIZINE HYDROCHLORIDE TABLETS 10 MG: 10 TABLET, FILM COATED ORAL at 09:22

## 2022-08-04 RX ADMIN — IPRATROPIUM BROMIDE AND ALBUTEROL SULFATE 3 ML: .5; 3 SOLUTION RESPIRATORY (INHALATION) at 06:23

## 2022-08-04 RX ADMIN — VERAPAMIL HYDROCHLORIDE 80 MG: 80 TABLET ORAL at 23:15

## 2022-08-04 RX ADMIN — GABAPENTIN 300 MG: 250 SOLUTION ORAL at 09:23

## 2022-08-04 RX ADMIN — HYDROMORPHONE HYDROCHLORIDE 0.5 MG: 2 INJECTION, SOLUTION INTRAMUSCULAR; INTRAVENOUS; SUBCUTANEOUS at 12:58

## 2022-08-04 RX ADMIN — NICOTINE 1 PATCH: 14 PATCH, EXTENDED RELEASE TRANSDERMAL at 22:45

## 2022-08-04 RX ADMIN — ONDANSETRON 4 MG: 2 INJECTION INTRAMUSCULAR; INTRAVENOUS at 09:38

## 2022-08-04 RX ADMIN — ENOXAPARIN SODIUM 40 MG: 40 INJECTION SUBCUTANEOUS at 09:23

## 2022-08-04 RX ADMIN — HYDROMORPHONE HYDROCHLORIDE 0.5 MG: 2 INJECTION, SOLUTION INTRAMUSCULAR; INTRAVENOUS; SUBCUTANEOUS at 22:44

## 2022-08-04 RX ADMIN — ONDANSETRON 4 MG: 2 INJECTION INTRAMUSCULAR; INTRAVENOUS at 00:27

## 2022-08-04 RX ADMIN — VERAPAMIL HYDROCHLORIDE 80 MG: 80 TABLET ORAL at 06:39

## 2022-08-04 RX ADMIN — HYDROMORPHONE HYDROCHLORIDE 0.5 MG: 2 INJECTION, SOLUTION INTRAMUSCULAR; INTRAVENOUS; SUBCUTANEOUS at 19:18

## 2022-08-04 NOTE — PLAN OF CARE
Problem: Adult Inpatient Plan of Care  Goal: Plan of Care Review  Outcome: Ongoing, Progressing  Flowsheets (Taken 8/4/2022 1541)  Outcome Evaluation: VSS, 3LNC. Pt resting comfortably, PRNs controlling pain and nausea. Ambulating the halls with 1 assist. NG to LWS, still not bowel function at this time. Bowel soudns are normoactive, however. JUDD and urostomy in place and draining. Will continue to monitor   Goal Outcome Evaluation:              Outcome Evaluation: VSS, 3LNC. Pt resting comfortably, PRNs controlling pain and nausea. Ambulating the halls with 1 assist. NG to LWS, still not bowel function at this time. Bowel soudns are normoactive, however. JUDD and urostomy in place and draining. Will continue to monitor

## 2022-08-04 NOTE — NURSING NOTE
Woc follow up for ostomy education    Surgery date: 08/02/2022    Ostomy type: Ileal conduit    Appliance in place: [place] 2 piece two 1/4 inch new image flat.    Last pouch change: 08/03/2022    Stoma Assessment: Appliance is well fitting intact stoma is beautiful protruding above skin level 35 mm stents in place adequate urine output.  Stoma pouch assessed that the right lower abdominal wound drain wound and the midline incision wound are both leaking fluids and these are getting the appliance wet and that is what was most likely making it follow-up with leak the last time.  As the bodily fluids taper off the pouch where time will lengthen.  Woc does not expect this wafer to last 5 to 7 days.  Which is good because it Woc will educate patient on how to change appliance.    Education performed: Patient educated patient again on importance of ambulation and awaiting bowel return.  Talked about the 2 piece pouch system in place and how after the stents come out and may be easier to switch to a 1 piece.  We will continue to follow would like to educate the wife as well but she is not in the room last 2 days of Woc visit.    Woc will continue to follow.     Please contact with questions or concerns.

## 2022-08-04 NOTE — PLAN OF CARE
Goal Outcome Evaluation:  Plan of Care Reviewed With: patient        Progress: no change    No acute events overnight. Vss except HR is usually low 100s. RLQ JUDD drain putting out moderate/large amount of bloody drainage. Changed dressing around drain site twice overnight. Lap sites glued SCOTT. Small midline incision covered with adhesive dressing. Urostomy appliance C/D/I and putting out adequate amount of yellow/blood-tinged urine with sediment. R nare NG tube has been to LIS most of the night. Pt did c/o nausea and abdominal pain. Symptoms managed with prn IV meds. Currently wearing 3 L humidified NC. Able to get IS to 2500 level. No flatus/BM yet.

## 2022-08-04 NOTE — PROGRESS NOTES
Patient feels well this morning  No flatus  Pain well controlled  Patient has been ambulating    Abdomen is benign  Stoma looks good    Appropriate JUDD drain output  Appropriate NG tube output      Wait for signs of bowel function prior to removing NG tube and advancing diet

## 2022-08-04 NOTE — PROGRESS NOTES
"IM progress note      Tru Buitrago  [id number]  1955     LOS: 2 days     Attending: Godfrey Lawrence Jr*    Primary Care Provider: Suresh Howard APRN      Chief Complaint/Reason for visit:  Abd pain    Subjective   Doing okay.  No n/vom/sob. Bothered by NGT and some incisional pain. No f/c. Ambulating. NGT/JUDD with expected output.   Objective       Visit Vitals  /76 (BP Location: Right arm, Patient Position: Lying)   Pulse 105   Temp 98 °F (36.7 °C) (Oral)   Resp 18   Ht 182.9 cm (72\")   Wt 122 kg (268 lb)   SpO2 (!) 89%   BMI 36.35 kg/m²     Temp (24hrs), Av.4 °F (36.9 °C), Min:97.8 °F (36.6 °C), Max:98.9 °F (37.2 °C)      Nutrition: NPO x ice chips    Respiratory: per NC, wean as able    Physical Exam:     General Appearance:    Alert, cooperative, in no acute distress   Head:    Normocephalic, without obvious abnormality, atraumatic    Lungs:     Normal effort, symmetric chest rise, no crepitus, clear to      auscultation bilaterally             Heart:    Regular rhythm and normal rate, normal S1 and S2   Abdomen:    Soft, obese.expected tenderness.  Clean incisions.  Ileal conduit draining urine with blood tinge.   Extremities:   No clubbing, cyanosis or edema.  No deformities.    Pulses:   Pulses palpable and equal bilaterally   Skin:   No bleeding, bruising or rash   Neurologic:   Moves all extremities with no obvious focal motor deficit.  Cranial nerves 2 - 12 grossly intact     Results Review:     I reviewed the patient's new clinical results.   Results from last 7 days   Lab Units 22  0823 22  1544   WBC 10*3/mm3 12.94* 6.48   HEMOGLOBIN g/dL 10.8* 13.5   HEMATOCRIT % 32.0* 40.0   PLATELETS 10*3/mm3 294 248     Results from last 7 days   Lab Units 22  0823 22  1544   SODIUM mmol/L 136 139   POTASSIUM mmol/L 4.8 4.4   CHLORIDE mmol/L 103 104   CO2 mmol/L 23.0 26.0   BUN mg/dL 17 10   CREATININE mg/dL 1.49* 1.54*   CALCIUM mg/dL 8.8 9.6 "   BILIRUBIN mg/dL  --  0.3   ALK PHOS U/L  --  88   ALT (SGPT) U/L  --  11   AST (SGOT) U/L  --  12   GLUCOSE mg/dL 143* 100*     I reviewed the patient's new imaging including images and reports.    All medications reviewed.   acetaminophen, 650 mg, Nasogastric, Q6H   Or  acetaminophen, 650 mg, Nasogastric, Q6H   Or  acetaminophen, 650 mg, Rectal, Q6H  atorvastatin, 20 mg, Nasogastric, Nightly  cetirizine, 10 mg, Nasogastric, Daily  enoxaparin, 40 mg, Subcutaneous, Daily  gabapentin, 300 mg, Oral, Q12H  ipratropium-albuterol, 3 mL, Nebulization, Q8H - RT  losartan, 50 mg, Nasogastric, Daily  montelukast, 10 mg, Nasogastric, Nightly  nicotine, 1 patch, Transdermal, Q24H  pantoprazole, 40 mg, Intravenous, Q AM  verapamil, 80 mg, Nasogastric, Q8H        Assessment & Plan     S/P cysto-prostatectomy with ileal conduit     Malignant neoplasm of posterior wall of urinary bladder (HCC)    COPD (chronic obstructive pulmonary disease) (HCC)    GERD (gastroesophageal reflux disease)    HTN (hypertension)    HLD (hyperlipidemia)    Smoker    Renal insufficiency    Acute postoperative pain    Leukocytosis, mild, likely reactive    Acute blood loss anemia, asymptomatic    Postoperative nausea      Plan  1. Ambulation, encouraged.  2. Pain control-prns   3. IS-encouraged  4. DVT proph- mechs/Lovenox  5. Bowel regimen  6.  N.p.o. except ice chips.  Continue IVF  7. Monitor post-op labs  8. DC planning for home     Postop nausea  - NGT till has evidence of bowel function.  - antiemetics PRN    COPD  -continue home inhalers  -monitor O2 sats     Smoker  -Nicotine patch, patient requested 14mg patch as 21mg makes him sick     HTN, Hyperlipidemia  - Continue home losartan, statin  - Monitor BP   - Holding parameters for BP meds  - Labetalol PRN for SBP>170     -New stoma:  Wound care stoma nurse consult for stoma care and education throughout patient's hospitalization.    GERD  -pepcid     RI  -avoid nephrotoxic agents      Ramses  MD Angi  08/04/22  09:16 EDT

## 2022-08-05 LAB
ANION GAP SERPL CALCULATED.3IONS-SCNC: 6 MMOL/L (ref 5–15)
BUN SERPL-MCNC: 14 MG/DL (ref 8–23)
BUN/CREAT SERPL: 12.2 (ref 7–25)
CALCIUM SPEC-SCNC: 8.8 MG/DL (ref 8.6–10.5)
CHLORIDE SERPL-SCNC: 110 MMOL/L (ref 98–107)
CO2 SERPL-SCNC: 26 MMOL/L (ref 22–29)
CREAT SERPL-MCNC: 1.15 MG/DL (ref 0.76–1.27)
CYTO UR: NORMAL
DEPRECATED RDW RBC AUTO: 47.7 FL (ref 37–54)
EGFRCR SERPLBLD CKD-EPI 2021: 71.1 ML/MIN/1.73
ERYTHROCYTE [DISTWIDTH] IN BLOOD BY AUTOMATED COUNT: 14.8 % (ref 12.3–15.4)
GLUCOSE SERPL-MCNC: 113 MG/DL (ref 65–99)
HCT VFR BLD AUTO: 26.1 % (ref 37.5–51)
HGB BLD-MCNC: 8.7 G/DL (ref 13–17.7)
LAB AP CASE REPORT: NORMAL
LAB AP CLINICAL INFORMATION: NORMAL
Lab: NORMAL
MCH RBC QN AUTO: 29.7 PG (ref 26.6–33)
MCHC RBC AUTO-ENTMCNC: 33.3 G/DL (ref 31.5–35.7)
MCV RBC AUTO: 89.1 FL (ref 79–97)
PATH REPORT.FINAL DX SPEC: NORMAL
PATH REPORT.GROSS SPEC: NORMAL
PLATELET # BLD AUTO: 224 10*3/MM3 (ref 140–450)
PMV BLD AUTO: 8.8 FL (ref 6–12)
POTASSIUM SERPL-SCNC: 4.1 MMOL/L (ref 3.5–5.2)
RBC # BLD AUTO: 2.93 10*6/MM3 (ref 4.14–5.8)
SODIUM SERPL-SCNC: 142 MMOL/L (ref 136–145)
WBC NRBC COR # BLD: 7.09 10*3/MM3 (ref 3.4–10.8)

## 2022-08-05 PROCEDURE — 25010000002 ONDANSETRON PER 1 MG

## 2022-08-05 PROCEDURE — 25010000002 PROCHLORPERAZINE 10 MG/2ML SOLUTION: Performed by: NURSE PRACTITIONER

## 2022-08-05 PROCEDURE — 80048 BASIC METABOLIC PNL TOTAL CA: CPT | Performed by: INTERNAL MEDICINE

## 2022-08-05 PROCEDURE — 25010000002 ENOXAPARIN PER 10 MG: Performed by: UROLOGY

## 2022-08-05 PROCEDURE — 94799 UNLISTED PULMONARY SVC/PX: CPT

## 2022-08-05 PROCEDURE — 85027 COMPLETE CBC AUTOMATED: CPT | Performed by: INTERNAL MEDICINE

## 2022-08-05 PROCEDURE — 25010000002 HYDROMORPHONE PER 4 MG: Performed by: UROLOGY

## 2022-08-05 PROCEDURE — 25010000002 SODIUM CHLORIDE 0.9 % WITH KCL 20 MEQ 20-0.9 MEQ/L-% SOLUTION: Performed by: UROLOGY

## 2022-08-05 RX ORDER — DOCUSATE SODIUM 100 MG/1
100 CAPSULE, LIQUID FILLED ORAL 2 TIMES DAILY PRN
Status: DISCONTINUED | OUTPATIENT
Start: 2022-08-05 | End: 2022-08-07 | Stop reason: HOSPADM

## 2022-08-05 RX ORDER — PANTOPRAZOLE SODIUM 40 MG/1
40 TABLET, DELAYED RELEASE ORAL
Status: DISCONTINUED | OUTPATIENT
Start: 2022-08-06 | End: 2022-08-06

## 2022-08-05 RX ORDER — GABAPENTIN 300 MG/1
300 CAPSULE ORAL EVERY 12 HOURS SCHEDULED
Status: DISCONTINUED | OUTPATIENT
Start: 2022-08-05 | End: 2022-08-07 | Stop reason: HOSPADM

## 2022-08-05 RX ORDER — VERAPAMIL HYDROCHLORIDE 240 MG/1
240 TABLET, FILM COATED, EXTENDED RELEASE ORAL NIGHTLY
Status: DISCONTINUED | OUTPATIENT
Start: 2022-08-05 | End: 2022-08-07 | Stop reason: HOSPADM

## 2022-08-05 RX ORDER — DOCUSATE SODIUM 50 MG/5 ML
100 LIQUID (ML) ORAL 2 TIMES DAILY PRN
Status: DISCONTINUED | OUTPATIENT
Start: 2022-08-05 | End: 2022-08-05

## 2022-08-05 RX ORDER — MONTELUKAST SODIUM 10 MG/1
10 TABLET ORAL NIGHTLY
Status: DISCONTINUED | OUTPATIENT
Start: 2022-08-05 | End: 2022-08-07 | Stop reason: HOSPADM

## 2022-08-05 RX ORDER — OXYCODONE HYDROCHLORIDE 5 MG/1
5 TABLET ORAL EVERY 4 HOURS PRN
Status: DISCONTINUED | OUTPATIENT
Start: 2022-08-05 | End: 2022-08-07 | Stop reason: HOSPADM

## 2022-08-05 RX ORDER — ONDANSETRON 2 MG/ML
4 INJECTION INTRAMUSCULAR; INTRAVENOUS EVERY 6 HOURS PRN
Status: DISCONTINUED | OUTPATIENT
Start: 2022-08-05 | End: 2022-08-07 | Stop reason: HOSPADM

## 2022-08-05 RX ORDER — ATORVASTATIN CALCIUM 20 MG/1
20 TABLET, FILM COATED ORAL NIGHTLY
Status: DISCONTINUED | OUTPATIENT
Start: 2022-08-05 | End: 2022-08-07 | Stop reason: HOSPADM

## 2022-08-05 RX ORDER — LOSARTAN POTASSIUM 50 MG/1
50 TABLET ORAL DAILY
Status: DISCONTINUED | OUTPATIENT
Start: 2022-08-06 | End: 2022-08-07 | Stop reason: HOSPADM

## 2022-08-05 RX ORDER — CETIRIZINE HYDROCHLORIDE 10 MG/1
10 TABLET ORAL DAILY
Status: DISCONTINUED | OUTPATIENT
Start: 2022-08-06 | End: 2022-08-07 | Stop reason: HOSPADM

## 2022-08-05 RX ORDER — ONDANSETRON 4 MG/1
4 TABLET, FILM COATED ORAL EVERY 6 HOURS PRN
Status: DISCONTINUED | OUTPATIENT
Start: 2022-08-05 | End: 2022-08-07 | Stop reason: HOSPADM

## 2022-08-05 RX ORDER — TIZANIDINE 4 MG/1
4 TABLET ORAL EVERY 8 HOURS PRN
Status: DISCONTINUED | OUTPATIENT
Start: 2022-08-05 | End: 2022-08-07 | Stop reason: HOSPADM

## 2022-08-05 RX ADMIN — MONTELUKAST 10 MG: 10 TABLET, FILM COATED ORAL at 21:49

## 2022-08-05 RX ADMIN — GABAPENTIN 300 MG: 300 CAPSULE ORAL at 21:49

## 2022-08-05 RX ADMIN — VERAPAMIL HYDROCHLORIDE 240 MG: 240 TABLET, FILM COATED, EXTENDED RELEASE ORAL at 21:49

## 2022-08-05 RX ADMIN — IPRATROPIUM BROMIDE AND ALBUTEROL SULFATE 3 ML: .5; 3 SOLUTION RESPIRATORY (INHALATION) at 06:42

## 2022-08-05 RX ADMIN — TIZANIDINE 4 MG: 4 TABLET ORAL at 21:49

## 2022-08-05 RX ADMIN — OXYCODONE 5 MG: 5 TABLET ORAL at 17:48

## 2022-08-05 RX ADMIN — LOSARTAN POTASSIUM 50 MG: 50 TABLET, FILM COATED ORAL at 08:55

## 2022-08-05 RX ADMIN — POTASSIUM CHLORIDE AND SODIUM CHLORIDE 100 ML/HR: 900; 150 INJECTION, SOLUTION INTRAVENOUS at 20:04

## 2022-08-05 RX ADMIN — ENOXAPARIN SODIUM 40 MG: 40 INJECTION SUBCUTANEOUS at 08:55

## 2022-08-05 RX ADMIN — VERAPAMIL HYDROCHLORIDE 80 MG: 80 TABLET ORAL at 05:47

## 2022-08-05 RX ADMIN — IPRATROPIUM BROMIDE AND ALBUTEROL SULFATE 3 ML: .5; 3 SOLUTION RESPIRATORY (INHALATION) at 22:13

## 2022-08-05 RX ADMIN — OXYCODONE 5 MG: 5 TABLET ORAL at 21:49

## 2022-08-05 RX ADMIN — PANTOPRAZOLE SODIUM 40 MG: 40 INJECTION, POWDER, FOR SOLUTION INTRAVENOUS at 05:30

## 2022-08-05 RX ADMIN — PROCHLORPERAZINE EDISYLATE 5 MG: 5 INJECTION INTRAMUSCULAR; INTRAVENOUS at 13:43

## 2022-08-05 RX ADMIN — CETIRIZINE HYDROCHLORIDE TABLETS 10 MG: 10 TABLET, FILM COATED ORAL at 08:55

## 2022-08-05 RX ADMIN — POTASSIUM CHLORIDE AND SODIUM CHLORIDE 100 ML/HR: 900; 150 INJECTION, SOLUTION INTRAVENOUS at 09:27

## 2022-08-05 RX ADMIN — ONDANSETRON 4 MG: 2 INJECTION INTRAMUSCULAR; INTRAVENOUS at 05:30

## 2022-08-05 RX ADMIN — PROCHLORPERAZINE EDISYLATE 5 MG: 5 INJECTION INTRAMUSCULAR; INTRAVENOUS at 01:10

## 2022-08-05 RX ADMIN — GABAPENTIN 300 MG: 250 SOLUTION ORAL at 08:55

## 2022-08-05 RX ADMIN — HYDROMORPHONE HYDROCHLORIDE 0.5 MG: 2 INJECTION, SOLUTION INTRAMUSCULAR; INTRAVENOUS; SUBCUTANEOUS at 05:30

## 2022-08-05 RX ADMIN — HYDROMORPHONE HYDROCHLORIDE 0.5 MG: 2 INJECTION, SOLUTION INTRAMUSCULAR; INTRAVENOUS; SUBCUTANEOUS at 13:43

## 2022-08-05 RX ADMIN — HYDROMORPHONE HYDROCHLORIDE 0.5 MG: 2 INJECTION, SOLUTION INTRAMUSCULAR; INTRAVENOUS; SUBCUTANEOUS at 09:27

## 2022-08-05 RX ADMIN — IPRATROPIUM BROMIDE AND ALBUTEROL SULFATE 3 ML: .5; 3 SOLUTION RESPIRATORY (INHALATION) at 15:21

## 2022-08-05 RX ADMIN — NICOTINE 1 PATCH: 14 PATCH, EXTENDED RELEASE TRANSDERMAL at 21:52

## 2022-08-05 RX ADMIN — HYDROMORPHONE HYDROCHLORIDE 0.5 MG: 2 INJECTION, SOLUTION INTRAMUSCULAR; INTRAVENOUS; SUBCUTANEOUS at 01:10

## 2022-08-05 NOTE — PLAN OF CARE
Goal Outcome Evaluation:  Plan of Care Reviewed With: patient        Progress: improving    Pain and nausea managed with prn IV meds. Pt has passed gas and ambulated in hallway several times overnight. RLQ JUDD drain still putting out moderate/large amount of bloody drainage. R NG tube had less output than previous night. Urostomy continues to have adequate output. Pt had been requiring humidified NC but is doing well on RA since around 05:00.

## 2022-08-05 NOTE — DISCHARGE PLACEMENT REQUEST
"Nilton Buitrago (66 y.o. Male)     - Jojo Manriquez,-[phone]    Plan to dc this weekend, CM can notify of dc and fax dc summary               Date of Birth   1955    Social Security Number       Address   [address] [place] [address]    Home Phone   [phone]    MRN   [id number]       Bahai   None    Marital Status   Single                            Admission Date   8/2/22    Admission Type   Elective    Admitting Provider   Godfrey Lawrence Jr., MD    Attending Provider   Godfrey Lawrence Jr., MD    Department, Room/Bed   [facility] 2F, S216/1       Discharge Date       Discharge Disposition       Discharge Destination                               Attending Provider: Godfrey Lawrence Jr., MD    Allergies: Food Color Orange [Yellow Dye], Tetanus Toxoids    Isolation: None   Infection: None   Code Status: CPR   Advance Care Planning Activity    Ht: 182.9 cm (72\")   Wt: 122 kg (268 lb)    Admission Cmt: None   Principal Problem: S/P cysto-prostatectomy with ileal conduit  [Z90.79]                 Active Insurance as of 8/2/2022     Primary Coverage     Payor Plan Insurance Group Employer/Plan Group    MISC REFERENCE BASED PRICING MISC REFERENCE BASED PRICING [id number]     Coverage Address Coverage Phone Number Coverage Fax Number Effective Dates    [address] [phone]  1/1/2018 - None Entered    [facility] [id number]       Subscriber Name Subscriber Birth Date Member ID       NILTON BUITRAGO 1955 [id number]                 Emergency Contacts      (Rel.) Home Phone Work Phone Mobile Phone    SHON GUERRERO (Significant Other) [phone] -- --          [facility] 2F  [address] [facility] [address]  Phone:  [phone]  Fax:  [phone] Date: Aug 5, 2022      Ambulatory Referral to Home Health     Patient:  Nilton Buitrago MRN:  [id number]   [address] [place] [address]" [place] [id number] :  1955  SSN:    Phone: [phone] Sex:  M      INSURANCE PAYOR PLAN GROUP # SUBSCRIBER ID   Primary:    MISC REFERENCE BASED PRICING [id number]      Referring Provider Information:  HARRY SENA JR Phone: [phone] Fax: [phone]       Referral Information:   # Visits:  999 Referral Type: Home Health [42]   Urgency:  Routine Referral Reason: Specialty Services Required   Start Date: Aug 5, 2022 End Date:  To be determined by Insurer   Diagnosis: Malignant neoplasm of posterior wall of urinary bladder (HCC) (C67.4 [ICD-10-CM] 188.4 [ICD-9-CM])  Bladder cancer (HCC) (C67.9 [ICD-10-CM] 188.9 [ICD-9-CM])  Chronic obstructive pulmonary disease, unspecified COPD type (HCC) (J44.9 [ICD-10-CM] 496 [ICD-9-CM])  S/P prostatectomy (Z90.79 [ICD-10-CM] V45.89 [ICD-9-CM])  Hypertension, unspecified type (I10 [ICD-10-CM] 401.9 [ICD-9-CM])      Refer to Dept:   Refer to Provider:   Refer to Provider Phone:   Refer to Facility:       Face to Face Visit Date: 2022  Follow-up provider for Plan of Care? I treated the patient in an acute care facility and will not continue treatment after discharge.  Follow-up provider: ALEXA ADHIKARI [[id number]]  Reason/Clinical Findings: S/P cysto-prostatectomy with ileal conduit,Malignant neoplasm of posterior wall of urinary bladder, COPD, hypertension  Describe mobility limitations that make leaving home difficult: S/P cysto-prostatectomy with ileal conduit,Malignant neoplasm of posterior wall of urinary bladder, COPD, hypertension  Nursing/Therapeutic Services Requested: Skilled Nursing  Skilled nursing orders: Ostomy instruction  Frequency: Other     This document serves as a request of services and does not constitute Insurance authorization or approval of services.  To determine eligibility, please contact the members Insurance carrier to verify and review coverage.     If you have medical questions regarding this  request for services. Please contact [address] at [phone] during normal business hours.        Authorizing Provider:Godfrey Lawrence Jr., MD  Authorizing Provider's NPI: [id number]  Order Entered By: Jojo Manriquez RN 2022 12:01 PM     Electronically signed by: Godfrey Lawrence Jr., MD 2022 12:01 PM         Insurance Information                MISC REFERENCE BASED PRICING/MISC REFERENCE BASED PRICING Phone: [phone]    Subscriber: Tru Buitrago Subscriber#: [id number]    Group#: [id number] Precert#: --             History & Physical      Betsy Hall APRN at 22 [phone]     Attestation signed by Ramses Whitley MD at 22 0822    I have reviewed this documentation and agree.  Subsequent records indicate patient underwent radical cystoprostatectomy, pelvic lymph node dissection, laparoscopic with da Pancho robot along with creation of ileal conduit.  Surgery was done under general anesthesia and a block and was tolerated well and he was admitted for further management.                Admission HP     Patient Name: Tru Buitrago  MRN: [id number]  : 1955  DOS: 2022    Attending: Godfrey Lawrence Jr*    Primary Care Provider: Suresh Howard APRN      Patient Care Team:  Suresh Howard APRN as PCP - General (Family Medicine)  Godfrey Lawrence Jr., MD as Referring Physician (Urology)  Kev Cobb MD as Consulting Physician (Radiation Oncology)    Chief complaint:  Bladder cancer    Subjective   Patient is a pleasant 66 y.o. male presented for scheduled surgery by Dr. Melinda العلي. He anticipates CYSTO-PROSTATECTOMY, PELVIC LYMPH NODE DISSECTION LAPAROSCOPIC WITH DAVINCI ROBOT, CREATION OF ILEAL CONDUIT today. He started having gross hematuria with urinary retention and passing clots about 2 months ago. He underwent a cystoscopy and was found to have a posterior bladder wall tumor that was  obstructing the right ureter. Pathology shower urothelial carcinoma. He denies any other urinary changes.     When seen in pre-op, patient is resting comfortably in bed with significant other at bedside. He denies pain, nausea, sob or chest pain.       Allergies:  Allergies   Allergen Reactions   • Food Color Orange [Yellow Dye] Nausea Only   • Tetanus Toxoids Swelling       Meds:  Medications Prior to Admission   Medication Sig Dispense Refill Last Dose   • Anoro Ellipta 62.5-25 MCG/INH aerosol powder  inhaler Inhale 1 puff Daily.   8/2/2022 at Unknown time   • atorvastatin (LIPITOR) 20 MG tablet Take 20 mg by mouth Daily.   8/1/2022 at Unknown time   • famotidine (PEPCID) 40 MG tablet Take 40 mg by mouth 2 (Two) Times a Day.   8/2/2022 at Unknown time   • fexofenadine (ALLEGRA) 180 MG tablet Take 180 mg by mouth Daily.   8/2/2022 at Unknown time   • gabapentin (NEURONTIN) 600 MG tablet Take 600 mg by mouth 3 (Three) Times a Day.   8/2/2022 at Unknown time   • HYDROcodone-acetaminophen (NORCO)  MG per tablet Take 1 tablet by mouth Every 8 (Eight) Hours As Needed for Moderate Pain .   8/2/2022 at Unknown time   • losartan (COZAAR) 50 MG tablet Take 50 mg by mouth Daily.   8/1/2022 at Unknown time   • montelukast (SINGULAIR) 10 MG tablet Take 10 mg by mouth Every Night.   8/1/2022 at Unknown time   • multivitamin with minerals tablet tablet Take 1 tablet by mouth Daily.   7/26/2022 at Unknown time   • tiZANidine (ZANAFLEX) 4 MG tablet Take 4 mg by mouth Every 8 (Eight) Hours As Needed.   8/2/2022 at Unknown time   • verapamil ER (VERELAN) 240 MG 24 hr capsule Take 240 mg by mouth Every Night.   8/1/2022 at Unknown time   • albuterol sulfate  (90 Base) MCG/ACT inhaler Inhale 2 puffs Every 4 (Four) Hours As Needed for Wheezing.   7/29/2022   • aspirin 325 MG tablet Take 325 mg by mouth Daily.   More than a month at Unknown time   • nicotine (NICODERM CQ) 14 MG/24HR patch Place 1 patch on the skin as  "directed by provider Daily.            History:   Past Medical History:   Diagnosis Date   • Bladder cancer (HCC)    • COPD (chronic obstructive pulmonary disease) (HCC)    • GERD (gastroesophageal reflux disease)    • Hyperlipidemia    • Hypertension      Past Surgical History:   Procedure Laterality Date   • COLONOSCOPY      2018   • CYSTOSCOPY BLADDER BIOPSY       Family History   Problem Relation Age of Onset   • Cancer Brother    • Throat cancer Brother      Social History     Tobacco Use   • Smoking status: Current Every Day Smoker     Packs/day: 1.00     Years: 50.00     Pack years: 50.00     Types: Cigarettes   • Smokeless tobacco: Never Used   Vaping Use   • Vaping Use: Never used   Substance Use Topics   • Alcohol use: Yes     Comment: occ   • Drug use: Not Currently       Review of Systems  Pertinent items are noted in HPI, all other systems reviewed and negative    Vital Signs  /94 (BP Location: Right arm, Patient Position: Sitting)   Pulse 92   Temp 97.5 °F (36.4 °C) (Temporal)   Resp 18   Ht 182.9 cm (72\")   Wt 122 kg (268 lb)   SpO2 94%   BMI 36.35 kg/m²     Physical Exam:    General Appearance:    Alert, cooperative, in no acute distress   Head:    Normocephalic, without obvious abnormality, atraumatic   Eyes:            Lids and lashes normal, conjunctivae and sclerae normal, no   icterus, no pallor, corneas clear    Ears:    Ears appear intact with no abnormalities noted   Throat:   No oral lesions, no thrush, oral mucosa moist   Neck:   No adenopathy, supple, trachea midline, no thyromegaly         Lungs:     Clear to auscultation,respirations regular, even and      unlabored, no wheezes or rales.    Heart:    Regular rhythm and normal rate, normal S1 and S2, no   murmur, no gallop    Abdomen:    Soft, nontender, benign exam   Genitalia:    Deferred   Extremities:   Moves all extremities well, no edema, no cyanosis, no          redness   Pulses:   Pulses palpable and equal bilaterally "   Skin:   No bleeding, bruising or rash          Results from last 7 days   Lab Units 08/01/22  1544   WBC 10*3/mm3 6.48   HEMOGLOBIN g/dL 13.5   HEMATOCRIT % 40.0   PLATELETS 10*3/mm3 248     Results from last 7 days   Lab Units 08/01/22  1544   SODIUM mmol/L 139   POTASSIUM mmol/L 4.4   CHLORIDE mmol/L 104   CO2 mmol/L 26.0   BUN mg/dL 10   CREATININE mg/dL 1.54*   CALCIUM mg/dL 9.6   BILIRUBIN mg/dL 0.3   ALK PHOS U/L 88   ALT (SGPT) U/L 11   AST (SGOT) U/L 12   GLUCOSE mg/dL 100*     No results found for: HGBA1C    Assessment and Plan:       Malignant neoplasm of posterior wall of urinary bladder (HCC)    COPD (chronic obstructive pulmonary disease) (HCC)    GERD (gastroesophageal reflux disease)    HTN (hypertension)    HLD (hyperlipidemia)    Smoker    Renal insufficiency      Plan:    Admitted for further management.      1. Ambulation, encouraged , starting today  2. Pain control-prns   3. IS-encourage  4. DVT proph- Mechanicals and Lovenox SQ to start POD1.  5. Bowel regimen  6. Resume home medications as appropriate.  7. Monitor post-op labs and path.  8. Clear liquid diet, advance as tolerated with return of bowel function  9.Monitor output from JUDD drain, aim to remove prior to hospital discharge.  10.Discharge planning.     COPD  -continue home inhalers  -monitor O2 sats    Smoker  -Nicotine patch, patient requested 14mg patch as 21mg makes him sick    HTN, Hyperlipidemia  - Continue home losartan, statin  - Monitor BP   - Holding parameters for BP meds  - Labetalol PRN for SBP>170    GERD  -pepcid    RI  -avoid nephrotoxic agents  -BMP in a.m.       Dragon disclaimer:  Part of this encounter note is an electronic transcription/translation of spoken language to printed text. The electronic translation of spoken language may permit erroneous, or at times, nonsensical words or phrases to be inadvertently transcribed; Although I have reviewed the note for such errors, some may still exist.      Betsy MONTES  ALLEN Hall  08/02/22  15:04 EDT            Electronically signed by Ramses Whitley MD at 08/03/22 0829     Rosie Finley APRN at 08/02/22 1235     Attestation signed by Godfrey Lawrence Jr., MD at 08/02/22 1317    I have reviewed this documentation and agree.                    Pre-Op H&P  Tru Buitrago  [id number]  1955      Chief complaint: Bladder cancer      Subjective:  Patient is a 66 y.o.male presents for scheduled surgery by Dr. Melinda Garrison. He anticipates a CYSTO-PROSTATECTOMY, PELVIC LYMPH NODE DISSECTION LAPAROSCOPIC WITH DAVINCI ROBOT, CREATION OF ILEAL CONDUIT today. He underwent cystoscopy on work-up of gross hematuria and was found to have posterior bladder wall tumor obstructing the right ureteral orifice. Pathology showed high-grade urothelial carcinoma. He denies any hematuria in the past couple weeks.      Review of Systems:  Constitutional-- No fever, chills or sweats. No fatigue.  CV-- No chest pain, palpitation or syncope. +HTN, HLD  Resp-- No cough, hemoptysis. +SOB, COPD  Skin--No rashes or lesions      Allergies:   Allergies   Allergen Reactions   • Food Color Orange [Yellow Dye] Nausea Only   • Tetanus Toxoids Swelling         Home Meds:  Medications Prior to Admission   Medication Sig Dispense Refill Last Dose   • Anoro Ellipta 62.5-25 MCG/INH aerosol powder  inhaler Inhale 1 puff Daily.   8/2/2022 at Unknown time   • atorvastatin (LIPITOR) 20 MG tablet Take 20 mg by mouth Daily.   8/1/2022 at Unknown time   • famotidine (PEPCID) 40 MG tablet Take 40 mg by mouth 2 (Two) Times a Day.   8/2/2022 at Unknown time   • fexofenadine (ALLEGRA) 180 MG tablet Take 180 mg by mouth Daily.   8/2/2022 at Unknown time   • gabapentin (NEURONTIN) 600 MG tablet Take 600 mg by mouth 3 (Three) Times a Day.   8/2/2022 at Unknown time   • HYDROcodone-acetaminophen (NORCO)  MG per tablet Take 1 tablet by mouth Every 8 (Eight) Hours As Needed for Moderate Pain .   8/2/2022 at  "Unknown time   • losartan (COZAAR) 50 MG tablet Take 50 mg by mouth Daily.   8/1/2022 at Unknown time   • montelukast (SINGULAIR) 10 MG tablet Take 10 mg by mouth Every Night.   8/1/2022 at Unknown time   • multivitamin with minerals tablet tablet Take 1 tablet by mouth Daily.   7/26/2022 at Unknown time   • tiZANidine (ZANAFLEX) 4 MG tablet Take 4 mg by mouth Every 8 (Eight) Hours As Needed.   8/2/2022 at Unknown time   • verapamil ER (VERELAN) 240 MG 24 hr capsule Take 240 mg by mouth Every Night.   8/1/2022 at Unknown time   • albuterol sulfate  (90 Base) MCG/ACT inhaler Inhale 2 puffs Every 4 (Four) Hours As Needed for Wheezing.   7/29/2022   • aspirin 325 MG tablet Take 325 mg by mouth Daily.   More than a month at Unknown time   • nicotine (NICODERM CQ) 14 MG/24HR patch Place 1 patch on the skin as directed by provider Daily.            PMH:   Past Medical History:   Diagnosis Date   • Bladder cancer (HCC)    • COPD (chronic obstructive pulmonary disease) (HCC)    • GERD (gastroesophageal reflux disease)    • Hyperlipidemia    • Hypertension      PSH:    Past Surgical History:   Procedure Laterality Date   • COLONOSCOPY      2018   • CYSTOSCOPY BLADDER BIOPSY         Immunization History:  Influenza: 2021  Pneumococcal: UTD  Tetanus: UTD  Covid x3: 2021    Social History:   Tobacco:   Social History     Tobacco Use   Smoking Status Current Every Day Smoker   • Packs/day: 1.00   • Years: 50.00   • Pack years: 50.00   • Types: Cigarettes   Smokeless Tobacco Never Used      Alcohol:     Social History     Substance and Sexual Activity   Alcohol Use Yes    Comment: occ         Physical Exam:/94 (BP Location: Right arm, Patient Position: Sitting)   Pulse 92   Temp 97.5 °F (36.4 °C) (Temporal)   Resp 18   Ht 182.9 cm (72\")   Wt 122 kg (268 lb)   SpO2 94%   BMI 36.35 kg/m²       General Appearance:    Alert, cooperative, no distress, appears stated age   Head:    Normocephalic, without obvious " abnormality, atraumatic   Lungs:     Clear to auscultation bilaterally, respirations unlabored    Heart:   Regular rate and rhythm, S1 and S2 normal    Abdomen:    Soft without tenderness   Extremities:   Extremities normal, atraumatic, no cyanosis or edema   Skin:   Skin color, texture, turgor normal, no rashes or lesions   Neurologic:   Grossly intact     Results Review:     LABS:  Lab Results   Component Value Date    WBC 6.48 08/01/2022    HGB 13.5 08/01/2022    HCT 40.0 08/01/2022    MCV 88.1 08/01/2022     08/01/2022    GLUCOSE 100 (H) 08/01/2022    BUN 10 08/01/2022    CREATININE 1.54 (H) 08/01/2022     08/01/2022    K 4.4 08/01/2022     08/01/2022    CO2 26.0 08/01/2022    CALCIUM 9.6 08/01/2022    ALBUMIN 4.40 08/01/2022    AST 12 08/01/2022    ALT 11 08/01/2022    BILITOT 0.3 08/01/2022       RADIOLOGY:  Imaging Results (Last 72 Hours)     ** No results found for the last 72 hours. **          I reviewed the patient's new clinical results.    Cancer Staging (if applicable)  Cancer Patient: __ yes __no __unknown; If yes, clinical stage T:__ N:__M:__, stage group or __N/A      Impression: Bladder cancer      Plan: CYSTO-PROSTATECTOMY, PELVIC LYMPH NODE DISSECTION LAPAROSCOPIC WITH DAVINCI ROBOT, CREATION OF ILEAL CONDUIT      ALLEN Mccoy   8/2/2022   12:35 EDT    Electronically signed by Godfrey Lawrence Jr., MD at 08/02/22 1317          Operative/Procedure Notes (all)      Godfrey Lawrence Jr., MD at 08/02/22 1338  Version 1 of 1         CYSTECTOMY LAPAROSCOPIC WITH DAVINCI ROBOT  Procedure Note    Tru Buitrago  8/2/2022    Pre-op Diagnosis:   Muscle invasive bladder cancer    Post-op Diagnosis:     Muscle invasive bladder cancer    Procedure/CPT® Codes:      Procedure(s):  Radical CYSTO-PROSTATECTOMY, PELVIC LYMPH NODE DISSECTION LAPAROSCOPIC WITH DAVINCI ROBOT, CREATION OF ILEAL CONDUIT    Surgeon(s):  Godfrey Lawrence Jr., MD    Anesthesia: General  with Block    Staff:   Circulator: Una Costa RN; Tanja Herrera RN; Kirstin Mcqueen, LEIGHA  Scrub Person: Gonzalez Reid; Radha Agrawal; Nael Evans  Nursing Assistant: Tom Garcias PCT; Patricia Velasquez  Assistant: Marah Hoskins PA    Assistant: Marah Hoskins PA Was needed to assist in this case with retraction, exposure, instrument placement.    Estimated Blood Loss: 600 mL  Urine Voided: 500 mL    Specimens:                Specimens     ID Source Type Tests Collected By Collected At Frozen?    A Ureter, Left Tissue · TISSUE PATHOLOGY EXAM   Godfrey Lawrence Jr., MD 8/2/22 1357 Yes    Description: DISTAL LEFT URETERAL MARGIN, MARGIN OPPOSITE STITCH , NEEDLE ON SPECIMEN     B Ureter, Right Tissue · TISSUE PATHOLOGY EXAM   Godfrey Lawrence Jr., MD 8/2/22 1405 Yes    Description: RIGHT DISTAL URETERA  MARGIN , MARGIN IN OPPOSITE STITCH     C Urethra Tissue · TISSUE PATHOLOGY EXAM   Godfrey Lawrence Jr., MD 8/2/22 1448 Yes    Description: DISTAL URETHRA MARGIN    D Urinary Bladder Tissue · TISSUE PATHOLOGY EXAM   Godfrey Lawrence Jr., MD 8/2/22 1523 No    Description: BLADDER AND PROSTATE     This specimen was not marked as sent.    E Lymph Node Tissue · TISSUE PATHOLOGY EXAM   Godfrey Lawrence Jr., MD 8/2/22 1529 No    Description: RIGHT PELVIC LYMPH NODE     This specimen was not marked as sent.    F Lymph Node Tissue · TISSUE PATHOLOGY EXAM   Godfrey Lawrence Jr., MD 8/2/22 1535 No    Description: LEFT PELVIC LYMPH NODE     This specimen was not marked as sent.            Drains:   Closed/Suction Drain RLQ Bulb 10 Fr. (Active)       Urostomy Ileal conduit LLQ (Active)       [REMOVED] Urethral Catheter Silicone 16 Fr. (Removed)       Findings: No sign of extra vesicle disease    Complications: None    History: This is a pleasant 66-year-old gentleman who was diagnosed with muscle invasive bladder cancer.  He is opted against neoadjuvant chemotherapy and wishes  to proceed with robotic assisted laparoscopic radical cystoprostatectomy with bilateral lymphadenectomy and ileal conduit urinary diversion after understanding risk benefits and alternatives.  He gives his full consent.    Operative Report: After consent is obtained patient's brought to the operating suite was placed in supine position.  Anesthesia was induced.  He is carefully placed in dorsolithotomy position padding all pressure points.  He sterilely prepped and draped in normal fashion.  Veress needle technique is used to create pneumoperitoneum.  A millimeter blunt Visiport is used in the midline just cephalad to the umbilicus.  Umbilical hernia is noted.  The remainder of the robotic and assistant trochars were placed.  Patient placed in steep Trendelenburg position the robot is docked.  Began the operation by performing distal ureterolysis on both ureters down to the level of the ureterovesical junction.  Ureters are divided and distal margins were sent off for frozen section analysis both which are negative for carcinoma.  At this point we dissected lateral to the bladder and prostate opening endopelvic fascia.  We also dissected posterior to the seminal vesicles and prostate through the posterior peritoneum creating a nice dissection plane for the vesicle and prostatic pedicles.  Prostatic and vesicle pedicles were taken down using a vessel sealer device.  We then dropped the bladder posteriorly by entering the space of Retzius.  Puboprostatic ligaments were divided.  Ligature of 0 Vicryl sutures placed around the dorsal venous complex.  Dorsal venous complex was divided and the urethra was divided.  Specimen was placed in specimen sac.  The distal urethral margin is sent for frozen section analysis which was negative.  We then closed the urethra using a 2-0 Vicryl suture.  Hemostasis was excellent.  Bilateral pelvic lymph node dissection was carried out using her vessel sealer device for hemostasis.  We  then created a space posterior to the sigmoid colon and passed her left ureter behind the sigmoid colon over to the right side in preparation for urinary diversion.  Both the right and left ureters were pexed to the anterior abdominal wall with 4-0 Monocryl suture.  Of note the right ureter was hydronephrotic from obstruction.  At this point an extraction site incision was created just below the umbilicus.  We incorporated the umbilical hernia defect with the unstrapped extraction site fascial defect.  Our specimens were delivered within their specimen sacs.  We then were able to identify the both ureters and bring them up into the incision without difficulty.  Using 3-0 Vicryl suture we identified the ureters and placed a stay suture for management.  We identified the distal ileum.  We excluded a suitable segment using JOSE F staplers.  We then performed a side to side ileal ileal anastomosis using a JOSE F stapler and TA stapler.  Mesenteric defect was closed using silk suture.  We open the ileal conduit which we had created and cleaned it out.  We then performed bilateral Charisse anastomoses with the left and right ureters after spatulation.  Ureteroenteric anastomotic stents were placed.  4-0 Monocryl was used for Charisse anastomoses in an interrupted fashion.  At this point at the site marked by the enterostomal therapy nurses we created a ileostomy for urostomy.  We secured the ileal conduit to the fascia using 2-0 Vicryl suture and performed Anvik maturation.  Ureteroenteric anastomotic stents were trimmed.  We completed our maturation of the stoma using 2-0 and 3-0 Vicryl suture.  Copious amounts of irrigation were used.  Our JUDD drain had already been placed through our most lateral right trocar site.  We closed the extraction site fascial defect using a looped PDS suture which incorporated the umbilical hernia.  All incisions were irrigated.  Subcutaneous tissues brought together in 3-0 Vicryl.  Skin was  "brought together using Dermabond.  Urostomy appliance was placed.  JUDD drain and was secured into place.  NG tube was placed.  Anesthesia was reversed.  Patient was taken the recovery room in stable condition.    Godfrey Lawrence Jr., MD     Date: 2022  Time: 16:36 EDT        Electronically signed by Godfrey Lawrence Jr., MD at 22 1643          Physician Progress Notes (last 24 hours)      Ramses Whitley MD at 22 1131          IM progress note      Tru Buitrago  [id number]  1955     LOS: 3 days     Attending: Godfrey Lawrence Jr*    Primary Care Provider: Suresh Howard APRN      Chief Complaint/Reason for visit:  Abd pain    Subjective   Doing okay.  Passed flatus.  NG tube removed.  Tolerating clears p.o.  Getting stoma education session when I saw him.  No nausea or vomiting.  Objective       Visit Vitals  /88 (BP Location: Right arm, Patient Position: Sitting)   Pulse 97   Temp 98.6 °F (37 °C) (Oral)   Resp 18   Ht 182.9 cm (72\")   Wt 122 kg (268 lb)   SpO2 95%   BMI 36.35 kg/m²     Temp (24hrs), Av.1 °F (36.7 °C), Min:97.6 °F (36.4 °C), Max:98.6 °F (37 °C)      Nutrition: NPO x ice chips    Respiratory: per NC, wean as able    Physical Exam:     General Appearance:    Alert, cooperative, in no acute distress   Head:    Normocephalic, without obvious abnormality, atraumatic    Lungs:     Normal effort, symmetric chest rise, no crepitus, clear to      auscultation bilaterally             Heart:    Regular rhythm and normal rate, normal S1 and S2   Abdomen:    Soft, obese.expected tenderness.  Clean incisions.  Ileal conduit draining urine.   Extremities:   No clubbing, cyanosis or edema.  No deformities.    Pulses:   Pulses palpable and equal bilaterally   Skin:   No bleeding, bruising or rash   Neurologic:   Moves all extremities with no obvious focal motor deficit.  Cranial nerves 2 - 12 grossly intact     Results Review:     I reviewed the " patient's new clinical results.   Results from last 7 days   Lab Units 08/05/22  0808 08/03/22  0823 08/01/22  1544   WBC 10*3/mm3 7.09 12.94* 6.48   HEMOGLOBIN g/dL 8.7* 10.8* 13.5   HEMATOCRIT % 26.1* 32.0* 40.0   PLATELETS 10*3/mm3 224 294 248     Results from last 7 days   Lab Units 08/05/22  0808 08/03/22  0823 08/01/22  1544   SODIUM mmol/L 142 136 139   POTASSIUM mmol/L 4.1 4.8 4.4   CHLORIDE mmol/L 110* 103 104   CO2 mmol/L 26.0 23.0 26.0   BUN mg/dL 14 17 10   CREATININE mg/dL 1.15 1.49* 1.54*   CALCIUM mg/dL 8.8 8.8 9.6   BILIRUBIN mg/dL  --   --  0.3   ALK PHOS U/L  --   --  88   ALT (SGPT) U/L  --   --  11   AST (SGOT) U/L  --   --  12   GLUCOSE mg/dL 113* 143* 100*     I reviewed the patient's new imaging including images and reports.    All medications reviewed.   atorvastatin, 20 mg, Nasogastric, Nightly  cetirizine, 10 mg, Nasogastric, Daily  enoxaparin, 40 mg, Subcutaneous, Daily  gabapentin, 300 mg, Oral, Q12H  ipratropium-albuterol, 3 mL, Nebulization, Q8H - RT  losartan, 50 mg, Nasogastric, Daily  montelukast, 10 mg, Nasogastric, Nightly  nicotine, 1 patch, Transdermal, Q24H  pantoprazole, 40 mg, Intravenous, Q AM  verapamil, 80 mg, Nasogastric, Q8H        Assessment & Plan     S/P cysto-prostatectomy with ileal conduit     Malignant neoplasm of posterior wall of urinary bladder (HCC)    COPD (chronic obstructive pulmonary disease) (HCC)    GERD (gastroesophageal reflux disease)    HTN (hypertension)    HLD (hyperlipidemia)    Smoker    Renal insufficiency    Acute postoperative pain    Leukocytosis, mild, likely reactive    Acute blood loss anemia, asymptomatic    Postoperative nausea    Plan  1. Ambulation, encouraged.  2. Pain control-prns   3. IS-encouraged  4. DVT proph- mechs/Lovenox  5. Bowel regimen  6.  N.p.o. except ice chips.  Continue IVF  7. Monitor post-op labs  8. DC planning for home     Postop nausea  - better, NG out.    COPD/ stable.  -continue home inhalers  -monitor O2  sats     Smoker  -Nicotine patch, patient requested 14mg patch as 21mg makes him sick     HTN, Hyperlipidemia  - Continue home losartan, statin  - Monitor BP   - Holding parameters for BP meds  - Labetalol PRN for SBP>170     -New stoma:  Wound care stoma nurse consult for stoma care and education throughout patient's hospitalization./ following.     GERD  -pepcid     RI, stable/ better.   -avoid nephrotoxic agents      Ramses Whitley MD  08/05/22  11:31 EDT    Electronically signed by Ramses Whitley MD at 08/05/22 1131     Godfrey Lawrence Jr., MD at 08/05/22 7429        Patient feeling well this morning  Reports flatus    Abdomen benign  Stoma looks good      NG tube clamp trial today  Advance to clear diet  Hopefully remove NG tube later today    Electronically signed by Godfrey Lawrence Jr., MD at 08/05/22 9385

## 2022-08-05 NOTE — CASE MANAGEMENT/SOCIAL WORK
Continued Stay Note  [facility]     Patient Name: Tru Buitrago  MRN: [id number]  Today's Date: 8/5/2022    Admit Date: 8/2/2022     Discharge Plan     Row Name 08/05/22 1607       Plan    Plan Comments CM spoke with Peña at [facility] and she has attempted to verify pt's insurance is accepted and not connected with [facility] and she has been unbale to clarify yet with insurance company. [facility] plans to see pt for nursing services if not [facility] connceted. CM will need to notify [facility] of discharge and fax dc summary to [phone]. CM discussed with pt at bedside and he is agreeable to plans and understands that [facility] may not be able to offer services once insurance confirmed. Pt reports he had a good session with [facility] nurse  today and feels he and his s.o. will be fine if he can't have services. Pt denies additional needs. CM will continue to follow.               Discharge Codes    No documentation.                     Jojo Manriquez RN

## 2022-08-05 NOTE — NURSING NOTE
Woc follow up for ostomy education    Surgery date: 08/02/2022    Ostomy type: Ileal conduit    Appliance in place: Worden 2 piece new image 2-1/4 inch flat    Last pouch change: 08/03/2022    Stoma Assessment: Stoma is rosebud in appearance protruding above skin level.  Pouch is fitting extremely well intact slight maceration to the wafer stents in place same length as yesterday.  Woc to Persaud bag.  Urine is turning more yellow and less red in it.    Education performed: Educated on how to get supplies once home, what supplies to order, catalog pages marked.  Discharge supplies given.  Reviewed how to use the leg bag and the connectors.  Discussed that at next pouch change before discharge Woc to measure and cut down wafer.  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  NG tube out today patient extremely happy.    Woc will continue to follow.     Please contact with questions or concerns.

## 2022-08-05 NOTE — PROGRESS NOTES
Patient feeling well this morning  Reports flatus    Abdomen benign  Stoma looks good      NG tube clamp trial today  Advance to clear diet  Hopefully remove NG tube later today

## 2022-08-05 NOTE — CASE MANAGEMENT/SOCIAL WORK
Continued Stay Note  [facility]     Patient Name: Tru Buitrago  MRN: [id number]  Today's Date: 8/5/2022    Admit Date: 8/2/2022     Discharge Plan     Row Name 08/05/22 1232       Plan    Plan home with home health    Provided Post Acute Provider List? Yes    Plan Comments CM spoke with pt at bedside. Pt resting, sitting up in chair. CM discussed discharge plans and pt plans to return home with assistance from s.o. 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 CM discussed option of home health services and pt is agreeable pt has [employer] insurance and the only home health agency is [facility] in his area. CM spoke with [facility] and they are checking his insurance for coverage and will notify CM if they can accept pt. CM faxed orders and clinicals to [phone]. Pt denies additional needs at this time and CM will continue to follow.    Final Discharge Disposition Code 06 - home with home health care    Row Name 08/05/22 1154       Plan    Final Discharge Disposition Code 30 - still a patient               Discharge Codes    No documentation.                     Jojo Manriquez, RN

## 2022-08-05 NOTE — PLAN OF CARE
Problem: Adult Inpatient Plan of Care  Goal: Plan of Care Review  Outcome: Ongoing, Progressing  Flowsheets (Taken 8/5/2022 1631)  Outcome Evaluation: VSS, RA. Pt ambulating frequently today. NG tube removed, tolerating clears. One loose BM today. Still requesting IV pain medicine, informed on importance of tolerating PO before discharge. No other concerns, will continue to monitor   Goal Outcome Evaluation:              Outcome Evaluation: VSS, RA. Pt ambulating frequently today. NG tube removed, tolerating clears. One loose BM today. Still requesting IV pain medicine, informed on importance of tolerating PO before discharge. No other concerns, will continue to monitor

## 2022-08-05 NOTE — PROGRESS NOTES
"IM progress note      Tru Buitrago  [id number]  1955     LOS: 3 days     Attending: Godfrey Lawrence Jr*    Primary Care Provider: Suresh Howard APRN      Chief Complaint/Reason for visit:  Abd pain    Subjective   Doing okay.  Passed flatus.  NG tube removed.  Tolerating clears p.o.  Getting stoma education session when I saw him.  No nausea or vomiting.  Objective       Visit Vitals  /88 (BP Location: Right arm, Patient Position: Sitting)   Pulse 97   Temp 98.6 °F (37 °C) (Oral)   Resp 18   Ht 182.9 cm (72\")   Wt 122 kg (268 lb)   SpO2 95%   BMI 36.35 kg/m²     Temp (24hrs), Av.1 °F (36.7 °C), Min:97.6 °F (36.4 °C), Max:98.6 °F (37 °C)      Nutrition: NPO x ice chips    Respiratory: per NC, wean as able    Physical Exam:     General Appearance:    Alert, cooperative, in no acute distress   Head:    Normocephalic, without obvious abnormality, atraumatic    Lungs:     Normal effort, symmetric chest rise, no crepitus, clear to      auscultation bilaterally             Heart:    Regular rhythm and normal rate, normal S1 and S2   Abdomen:    Soft, obese.expected tenderness.  Clean incisions.  Ileal conduit draining urine.   Extremities:   No clubbing, cyanosis or edema.  No deformities.    Pulses:   Pulses palpable and equal bilaterally   Skin:   No bleeding, bruising or rash   Neurologic:   Moves all extremities with no obvious focal motor deficit.  Cranial nerves 2 - 12 grossly intact     Results Review:     I reviewed the patient's new clinical results.   Results from last 7 days   Lab Units 22  0808 22  0822  1544   WBC 10*3/mm3 7.09 12.94* 6.48   HEMOGLOBIN g/dL 8.7* 10.8* 13.5   HEMATOCRIT % 26.1* 32.0* 40.0   PLATELETS 10*3/mm3 224 294 248     Results from last 7 days   Lab Units 22  0808 22  0822  1544   SODIUM mmol/L 142 136 139   POTASSIUM mmol/L 4.1 4.8 4.4   CHLORIDE mmol/L 110* 103 104   CO2 mmol/L 26.0 23.0 26.0   BUN mg/dL " 14 17 10   CREATININE mg/dL 1.15 1.49* 1.54*   CALCIUM mg/dL 8.8 8.8 9.6   BILIRUBIN mg/dL  --   --  0.3   ALK PHOS U/L  --   --  88   ALT (SGPT) U/L  --   --  11   AST (SGOT) U/L  --   --  12   GLUCOSE mg/dL 113* 143* 100*     I reviewed the patient's new imaging including images and reports.    All medications reviewed.   atorvastatin, 20 mg, Nasogastric, Nightly  cetirizine, 10 mg, Nasogastric, Daily  enoxaparin, 40 mg, Subcutaneous, Daily  gabapentin, 300 mg, Oral, Q12H  ipratropium-albuterol, 3 mL, Nebulization, Q8H - RT  losartan, 50 mg, Nasogastric, Daily  montelukast, 10 mg, Nasogastric, Nightly  nicotine, 1 patch, Transdermal, Q24H  pantoprazole, 40 mg, Intravenous, Q AM  verapamil, 80 mg, Nasogastric, Q8H        Assessment & Plan     S/P cysto-prostatectomy with ileal conduit     Malignant neoplasm of posterior wall of urinary bladder (HCC)    COPD (chronic obstructive pulmonary disease) (HCC)    GERD (gastroesophageal reflux disease)    HTN (hypertension)    HLD (hyperlipidemia)    Smoker    Renal insufficiency    Acute postoperative pain    Leukocytosis, mild, likely reactive    Acute blood loss anemia, asymptomatic    Postoperative nausea    Plan  1. Ambulation, encouraged.  2. Pain control-prns   3. IS-encouraged  4. DVT proph- mechs/Lovenox  5. Bowel regimen  6.  N.p.o. except ice chips.  Continue IVF  7. Monitor post-op labs  8. DC planning for home     Postop nausea  - better, NG out.    COPD/ stable.  -continue home inhalers  -monitor O2 sats     Smoker  -Nicotine patch, patient requested 14mg patch as 21mg makes him sick     HTN, Hyperlipidemia  - Continue home losartan, statin  - Monitor BP   - Holding parameters for BP meds  - Labetalol PRN for SBP>170     -New stoma:  Wound care stoma nurse consult for stoma care and education throughout patient's hospitalization./ following.     GERD  -pepcid     RI, stable/ better.   -avoid nephrotoxic agents      Ramses Whitley MD  08/05/22  11:31 EDT

## 2022-08-06 PROBLEM — D64.9 ANEMIA: Status: ACTIVE | Noted: 2022-08-06

## 2022-08-06 PROCEDURE — 25010000002 ONDANSETRON PER 1 MG

## 2022-08-06 PROCEDURE — 63710000001 PROCHLORPERAZINE MALEATE PER 5 MG: Performed by: NURSE PRACTITIONER

## 2022-08-06 PROCEDURE — 94664 DEMO&/EVAL PT USE INHALER: CPT

## 2022-08-06 PROCEDURE — 94761 N-INVAS EAR/PLS OXIMETRY MLT: CPT

## 2022-08-06 PROCEDURE — 25010000002 SODIUM CHLORIDE 0.9 % WITH KCL 20 MEQ 20-0.9 MEQ/L-% SOLUTION: Performed by: UROLOGY

## 2022-08-06 PROCEDURE — 94799 UNLISTED PULMONARY SVC/PX: CPT

## 2022-08-06 PROCEDURE — 25010000002 ENOXAPARIN PER 10 MG: Performed by: UROLOGY

## 2022-08-06 RX ORDER — FAMOTIDINE 20 MG/1
20 TABLET, FILM COATED ORAL
Status: DISCONTINUED | OUTPATIENT
Start: 2022-08-06 | End: 2022-08-07 | Stop reason: HOSPADM

## 2022-08-06 RX ADMIN — PROCHLORPERAZINE MALEATE 5 MG: 5 TABLET ORAL at 21:36

## 2022-08-06 RX ADMIN — ENOXAPARIN SODIUM 40 MG: 40 INJECTION SUBCUTANEOUS at 09:52

## 2022-08-06 RX ADMIN — MONTELUKAST 10 MG: 10 TABLET, FILM COATED ORAL at 20:35

## 2022-08-06 RX ADMIN — NICOTINE 1 PATCH: 14 PATCH, EXTENDED RELEASE TRANSDERMAL at 20:36

## 2022-08-06 RX ADMIN — POTASSIUM CHLORIDE AND SODIUM CHLORIDE 100 ML/HR: 900; 150 INJECTION, SOLUTION INTRAVENOUS at 06:13

## 2022-08-06 RX ADMIN — GABAPENTIN 300 MG: 300 CAPSULE ORAL at 20:35

## 2022-08-06 RX ADMIN — FAMOTIDINE 20 MG: 20 TABLET ORAL at 16:34

## 2022-08-06 RX ADMIN — VERAPAMIL HYDROCHLORIDE 240 MG: 240 TABLET, FILM COATED, EXTENDED RELEASE ORAL at 20:44

## 2022-08-06 RX ADMIN — IPRATROPIUM BROMIDE AND ALBUTEROL SULFATE 3 ML: .5; 3 SOLUTION RESPIRATORY (INHALATION) at 07:27

## 2022-08-06 RX ADMIN — ONDANSETRON 4 MG: 2 INJECTION INTRAMUSCULAR; INTRAVENOUS at 11:58

## 2022-08-06 RX ADMIN — OXYCODONE 5 MG: 5 TABLET ORAL at 20:36

## 2022-08-06 RX ADMIN — IPRATROPIUM BROMIDE AND ALBUTEROL SULFATE 3 ML: .5; 3 SOLUTION RESPIRATORY (INHALATION) at 16:12

## 2022-08-06 RX ADMIN — PANTOPRAZOLE SODIUM 40 MG: 40 TABLET, DELAYED RELEASE ORAL at 06:13

## 2022-08-06 RX ADMIN — OXYCODONE 5 MG: 5 TABLET ORAL at 09:51

## 2022-08-06 RX ADMIN — CETIRIZINE HYDROCHLORIDE TABLETS 10 MG: 10 TABLET, FILM COATED ORAL at 09:52

## 2022-08-06 RX ADMIN — GABAPENTIN 300 MG: 300 CAPSULE ORAL at 09:51

## 2022-08-06 RX ADMIN — OXYCODONE 5 MG: 5 TABLET ORAL at 06:13

## 2022-08-06 RX ADMIN — POTASSIUM CHLORIDE AND SODIUM CHLORIDE 100 ML/HR: 900; 150 INJECTION, SOLUTION INTRAVENOUS at 16:38

## 2022-08-06 RX ADMIN — ONDANSETRON 4 MG: 2 INJECTION INTRAMUSCULAR; INTRAVENOUS at 22:20

## 2022-08-06 RX ADMIN — LOSARTAN POTASSIUM 50 MG: 50 TABLET, FILM COATED ORAL at 09:51

## 2022-08-06 NOTE — NURSING NOTE
"[facility] follow up for Stoma care and assessment    Surgery date:8/2  [facility] Care Outcome: Patient seen for POD#4 care, assessment and fitting. Patient out of bed to chair. Patient seen in the medical surgical floor.  Patient concerns/needs to be addressed: None.  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  Also states that his brother-in-law has both a colostomy and urostomy lives a normal life, and has told him he will support him and assist him with supplies etc. if he needs it once he is discharged.   Stoma Type: Urostomy/ileal conduit      Character of output: In urometer portion of Persaud catheter bag urine appears clear, yellow; in Persaud catheter bag urine has slight red tent.    Emptying frequency per day: when 1/3 to 1/2 full by staff     Appliance in place: Woodland 2 piece new image 2-1/4 inch flat     Last pouch change: 08/03/2022    Goal wear time: 3-7 days    Education provided: None, see above, patient politely refused education and pouch change.  Did provide patient with 4 additional Woodland blue 2 3/4\" flat wafer urostomy pouches after discussion with patient and concern of [facility] RN that if patient has issues with leaking he may need a few additional pouches even though these are larger than what he has on at this time.  Did provide patient with the tip of using a tampon while changing the bag.  Did demonstrate the flexibility of a 1 piece pouch and showed patient how to attach the stoma belt to the 2 piece wafer and that it is recommended that he warm the wafer to assure seating to his skin either by wearing a stoma belt for 3 to 4 hours after applying or using a blow dryer on very low setting.       [facility] Nurse will sign off at patient's request.  If alteration to skin integrity or concerns with stoma or ostomy equipment, please contact [facility] team.  "

## 2022-08-06 NOTE — PROGRESS NOTES
Patient is doing well this morning  Pain well controlled    Continues to have flatus and bowel movement      Abdomen benign  Stoma looks healthy      Advance diet  Home soon

## 2022-08-06 NOTE — PLAN OF CARE
Goal Outcome Evaluation:  Plan of Care Reviewed With: patient        Progress: improving    No acute events overnight. VSS. No c/o nausea. Pain managed with prn po pain med. RLQ JUDD drain still putting out moderate amount of bloody drainage. R urostomy continues to have adequate output.

## 2022-08-06 NOTE — PLAN OF CARE
Goal Outcome Evaluation:  Plan of Care Reviewed With: patient        Progress: improving  Outcome Evaluation: Patient walks frequently. Having bowel movements, diet advanced.  Patient advancing well.

## 2022-08-06 NOTE — PROGRESS NOTES
"IM progress note      Tru Buitrago  [id number]  1955     LOS: 4 days     Attending: Godfrey Lawrence Jr*    Primary Care Provider: Suresh Howard APRN      Chief Complaint/Reason for visit:  Abd pain    Subjective   Doing well. Good pain control. Ambulating. Tolerating full liquids. Passing flatus and stool. Denies f/c/n/v/sob/cp.    Objective       Visit Vitals  /79 (BP Location: Right arm, Patient Position: Lying)   Pulse 85   Temp 98.7 °F (37.1 °C) (Oral)   Resp 16   Ht 182.9 cm (72\")   Wt 122 kg (268 lb)   SpO2 93%   BMI 36.35 kg/m²     Temp (24hrs), Av.2 °F (36.8 °C), Min:97.8 °F (36.6 °C), Max:98.7 °F (37.1 °C)      Nutrition: adv to gi soft diet    Respiratory: RA    Physical Exam:     General Appearance:    Alert, cooperative, in no acute distress   Head:    Normocephalic, without obvious abnormality, atraumatic    Lungs:     Normal effort, symmetric chest rise, no crepitus, clear to      auscultation bilaterally             Heart:    Regular rhythm and normal rate, normal S1 and S2   Abdomen:    Soft, obese.expected tenderness.  Clean incisions.  Ileal conduit draining yellow urine.   Extremities:   No clubbing, cyanosis or edema.  No deformities.    Pulses:   Pulses palpable and equal bilaterally   Skin:   No bleeding, bruising or rash   Neurologic:   Moves all extremities with no obvious focal motor deficit.  Cranial nerves 2 - 12 grossly intact     Results Review:     I reviewed the patient's new clinical results.   Results from last 7 days   Lab Units 22  0808 22  0822  1544   WBC 10*3/mm3 7.09 12.94* 6.48   HEMOGLOBIN g/dL 8.7* 10.8* 13.5   HEMATOCRIT % 26.1* 32.0* 40.0   PLATELETS 10*3/mm3 224 294 248     Results from last 7 days   Lab Units 22  0808 22  0823 22  1544   SODIUM mmol/L 142 136 139   POTASSIUM mmol/L 4.1 4.8 4.4   CHLORIDE mmol/L 110* 103 104   CO2 mmol/L 26.0 23.0 26.0   BUN mg/dL 14 17 10   CREATININE mg/dL " 1.15 1.49* 1.54*   CALCIUM mg/dL 8.8 8.8 9.6   BILIRUBIN mg/dL  --   --  0.3   ALK PHOS U/L  --   --  88   ALT (SGPT) U/L  --   --  11   AST (SGOT) U/L  --   --  12   GLUCOSE mg/dL 113* 143* 100*     I reviewed the patient's new imaging including images and reports.    All medications reviewed.   atorvastatin, 20 mg, Oral, Nightly  cetirizine, 10 mg, Oral, Daily  enoxaparin, 40 mg, Subcutaneous, Daily  famotidine, 20 mg, Oral, TID AC  gabapentin, 300 mg, Oral, Q12H  ipratropium-albuterol, 3 mL, Nebulization, Q8H - RT  losartan, 50 mg, Oral, Daily  montelukast, 10 mg, Oral, Nightly  nicotine, 1 patch, Transdermal, Q24H  verapamil SR, 240 mg, Oral, Nightly        Assessment & Plan     S/P cysto-prostatectomy with ileal conduit     Malignant neoplasm of posterior wall of urinary bladder (HCC)    COPD (chronic obstructive pulmonary disease) (HCC)    GERD (gastroesophageal reflux disease)    HTN (hypertension)    HLD (hyperlipidemia)    Smoker    Renal insufficiency    Acute postoperative pain    Leukocytosis, mild, likely reactive    Acute blood loss anemia, asymptomatic    Postoperative nausea    Anemia    Plan  1. Ambulation, encouraged.  2. Pain control-prns   3. IS-encouraged  4. DVT proph- mechs/Lovenox  5. Bowel regimen  6. GI soft diet  7. Monitor post-op labs  8. DC planning for home, likely tomorrow    Postop nausea  - better, NG out.    COPD/ stable.  -continue home inhalers  -monitor O2 sats     Smoker  -Nicotine patch, patient requested 14mg patch as 21mg makes him sick     HTN, Hyperlipidemia  - Continue home losartan, statin  - Monitor BP   - Holding parameters for BP meds  - Labetalol PRN for SBP>170     -New stoma:  Wound care stoma nurse consult for stoma care and education throughout patient's hospitalization./ following.     GERD  -pepcid     RI, stable/ better.   -avoid nephrotoxic agents      ALLEN Mccoy  08/06/22  12:33 EDT

## 2022-08-07 ENCOUNTER — READMISSION MANAGEMENT (OUTPATIENT)
Dept: CALL CENTER | Facility: HOSPITAL | Age: 67
End: 2022-08-07

## 2022-08-07 VITALS
HEART RATE: 67 BPM | SYSTOLIC BLOOD PRESSURE: 131 MMHG | BODY MASS INDEX: 36.3 KG/M2 | RESPIRATION RATE: 18 BRPM | TEMPERATURE: 97.6 F | DIASTOLIC BLOOD PRESSURE: 81 MMHG | WEIGHT: 268 LBS | HEIGHT: 72 IN | OXYGEN SATURATION: 93 %

## 2022-08-07 PROCEDURE — 94799 UNLISTED PULMONARY SVC/PX: CPT

## 2022-08-07 PROCEDURE — 25010000002 ENOXAPARIN PER 10 MG: Performed by: UROLOGY

## 2022-08-07 PROCEDURE — 94664 DEMO&/EVAL PT USE INHALER: CPT

## 2022-08-07 RX ADMIN — IPRATROPIUM BROMIDE AND ALBUTEROL SULFATE 3 ML: .5; 3 SOLUTION RESPIRATORY (INHALATION) at 07:26

## 2022-08-07 RX ADMIN — ENOXAPARIN SODIUM 40 MG: 40 INJECTION SUBCUTANEOUS at 08:40

## 2022-08-07 RX ADMIN — FAMOTIDINE 20 MG: 20 TABLET ORAL at 08:39

## 2022-08-07 RX ADMIN — OXYCODONE 5 MG: 5 TABLET ORAL at 09:10

## 2022-08-07 RX ADMIN — GABAPENTIN 300 MG: 300 CAPSULE ORAL at 08:39

## 2022-08-07 RX ADMIN — LOSARTAN POTASSIUM 50 MG: 50 TABLET, FILM COATED ORAL at 08:39

## 2022-08-07 RX ADMIN — CETIRIZINE HYDROCHLORIDE TABLETS 10 MG: 10 TABLET, FILM COATED ORAL at 08:40

## 2022-08-07 NOTE — DISCHARGE SUMMARY
Patient Name: Tru Buitrago  MRN: [id number]  : 1955  DOS: 2022    Attending: Godfrey Lawrence Jr*    Primary Care Provider: Suresh Howard APRN    Date of Admission:.2022 11:41 AM    Date of Discharge:  2022    Discharge Diagnosis:   S/P cysto-prostatectomy with ileal conduit     Malignant neoplasm of posterior wall of urinary bladder (HCC)    COPD (chronic obstructive pulmonary disease) (HCC)    GERD (gastroesophageal reflux disease)    HTN (hypertension)    HLD (hyperlipidemia)    Smoker    Renal insufficiency    Acute postoperative pain    Leukocytosis, mild, likely reactive    Acute blood loss anemia, asymptomatic    Postoperative nausea    Anemia      Hospital Course    At admit:    Patient is a pleasant 66 y.o. male presented for scheduled surgery by Dr. Melinda العلي. He anticipates CYSTO-PROSTATECTOMY, PELVIC LYMPH NODE DISSECTION LAPAROSCOPIC WITH DAVINCI ROBOT, CREATION OF ILEAL CONDUIT today. He started having gross hematuria with urinary retention and passing clots about 2 months ago. He underwent a cystoscopy and was found to have a posterior bladder wall tumor that was obstructing the right ureter. Pathology shower urothelial carcinoma. He denies any other urinary changes.      When seen in pre-op, patient is resting comfortably in bed with significant other at bedside. He denies pain, nausea, sob or chest pain.     After admit:    He was provided pain medication as needed for pain control, he received DVT prophylaxis with mechanicals, and Lovenox.  Adjustments were made to pain medications to optimize postop pain management. Risks and benefits of opiate medications discussed with patient.  Dung report in chart was reviewed prior to discharge.    Postoperatively, he had NGT to suction until he demonstrated bowel function. Once bowel function was established the NGT was discontinued and his diet was advanced. Prior to discharge he is tolerating soft diet and  "having bowel movements.    He was seen by WOCN for instructions on care of his new appliance.    His home medications were resumed as appropriate.    He was provided a bowel regimen and was encouraged to ambulate frequently which he did.  When I saw him today he is doing quite well and he is ready for discharge home.      Procedures Performed    2022     Pre-op Diagnosis:   Muscle invasive bladder cancer     Post-op Diagnosis:     Muscle invasive bladder cancer    Procedure(s):  Radical CYSTO-PROSTATECTOMY, PELVIC LYMPH NODE DISSECTION LAPAROSCOPIC WITH DAVINCI ROBOT, CREATION OF ILEAL CONDUIT     Surgeon(s):  Godfrey Lawrence Jr., MD    Pertinent Test Results:    I reviewed the patient's new clinical results.   Results from last 7 days   Lab Units 22  0822  0822  1544   WBC 10*3/mm3 7.09 12.94* 6.48   HEMOGLOBIN g/dL 8.7* 10.8* 13.5   HEMATOCRIT % 26.1* 32.0* 40.0   PLATELETS 10*3/mm3 224 294 248     Results from last 7 days   Lab Units 22  0822  0822  1544   SODIUM mmol/L 142 136 139   POTASSIUM mmol/L 4.1 4.8 4.4   CHLORIDE mmol/L 110* 103 104   CO2 mmol/L 26.0 23.0 26.0   BUN mg/dL 14 17 10   CREATININE mg/dL 1.15 1.49* 1.54*   CALCIUM mg/dL 8.8 8.8 9.6   BILIRUBIN mg/dL  --   --  0.3   ALK PHOS U/L  --   --  88   ALT (SGPT) U/L  --   --  11   AST (SGOT) U/L  --   --  12   GLUCOSE mg/dL 113* 143* 100*     I reviewed the patient's new imaging including images and reports.      Discharge Assessment:    Vital Signs  Visit Vitals  /76 (BP Location: Right arm, Patient Position: Sitting)   Pulse 75   Temp 97.9 °F (36.6 °C) (Oral)   Resp 18   Ht 182.9 cm (72\")   Wt 122 kg (268 lb)   SpO2 93%   BMI 36.35 kg/m²     Temp (24hrs), Av.2 °F (36.8 °C), Min:97.8 °F (36.6 °C), Max:98.7 °F (37.1 °C)      General Appearance:    Alert, cooperative, in no acute distress   Lungs:     Clear to auscultation,respirations regular, even and unlabored    Heart:    Regular " rhythm and normal rate, normal S1 and S2    Abdomen:   Soft, benign, clean incisions.  JUDD drain, out prior to discharge if ok with Dr. Lawrence. Ileal conduit draining yellow urine.   Extremities:   Moves all extremities well, no edema, no cyanosis, no redness   Pulses:   Pulses palpable and equal bilaterally   Skin:   No bleeding, bruising or rash          Discharge Disposition: Home    Discharge Medications     Discharge Medications      New Medications      Instructions Start Date   docusate sodium 100 MG capsule  Commonly known as: Colace   100 mg, Oral, Daily PRN      HYDROcodone-acetaminophen 7.5-325 MG per tablet  Commonly known as: NORCO  Replaces: HYDROcodone-acetaminophen  MG per tablet   1 tablet, Oral, Every 6 Hours PRN      nitrofurantoin (macrocrystal-monohydrate) 100 MG capsule  Commonly known as: Macrobid   100 mg, Oral, 2 Times Daily      polyethylene glycol 17 g packet  Commonly known as: MIRALAX   17 g, Oral, Daily         Continue These Medications      Instructions Start Date   albuterol sulfate  (90 Base) MCG/ACT inhaler  Commonly known as: PROVENTIL HFA;VENTOLIN HFA;PROAIR HFA   2 puffs, Inhalation, Every 4 Hours PRN      Anoro Ellipta 62.5-25 MCG/INH aerosol powder  inhaler  Generic drug: umeclidinium-vilanterol   1 puff, Inhalation, Daily - RT      aspirin 325 MG tablet   325 mg, Oral, Daily      atorvastatin 20 MG tablet  Commonly known as: LIPITOR   20 mg, Oral, Daily      famotidine 40 MG tablet  Commonly known as: PEPCID   40 mg, Oral, 2 Times Daily      fexofenadine 180 MG tablet  Commonly known as: ALLEGRA   180 mg, Oral, Daily      gabapentin 600 MG tablet  Commonly known as: NEURONTIN   600 mg, Oral, 3 Times Daily      losartan 50 MG tablet  Commonly known as: COZAAR   50 mg, Oral, Daily      montelukast 10 MG tablet  Commonly known as: SINGULAIR   10 mg, Oral, Nightly      multivitamin with minerals tablet tablet   1 tablet, Oral, Daily      nicotine 14 MG/24HR  patch  Commonly known as: NICODERM CQ   1 patch, Transdermal, Every 24 Hours      tiZANidine 4 MG tablet  Commonly known as: ZANAFLEX   4 mg, Oral, Every 8 Hours PRN      verapamil  MG 24 hr capsule  Commonly known as: VERELAN   240 mg, Oral, Nightly         Stop These Medications    HYDROcodone-acetaminophen  MG per tablet  Commonly known as: NORCO  Replaced by: HYDROcodone-acetaminophen 7.5-325 MG per tablet            Discharge Diet: Soft, bland diet    Activity at Discharge: ambulate    Follow-up Appointments  Dr. Melinda Garrison per his orders       ALLEN Mccoy  08/07/22  10:57 EDT

## 2022-08-07 NOTE — PROGRESS NOTES
Patient is doing well this morning  Pain well controlled  Tolerating diet  Continues to have bowel function    Abdomen benign  Stoma looks good      Hopefully home later today  Home health nursing to be arranged for aid with stoma management

## 2022-08-07 NOTE — PLAN OF CARE
Goal Outcome Evaluation:  Plan of Care Reviewed With: patient           Outcome Evaluation: Ambulating independently, Good PO intake, pain controlled with medication, no nausea at discharge. Home with spouse today.       Patient teaching on JUDD drain care and Urostomy appliances and care. Home Health to follow at home.

## 2022-08-07 NOTE — CASE MANAGEMENT/SOCIAL WORK
Discharge Planning Assessment  [facility]     Patient Name: Tru Buitrago  MRN: [id number]  Today's Date: 8/7/2022    Admit Date: 8/2/2022     Discharge Needs Assessment    No documentation.                Discharge Plan     Row Name 08/07/22 1110       Plan    Plan Home with [facility]    Patient/Family in Agreement with Plan yes    Plan Comments DC summary faxed to [facility] for SN at [phone]. Email sent to THOMAS Uribe to please follow-up with [facility] on Monday to make sure they are going to see Mr Buitrago on Monday for start of care. Called & spoke with Mr Buitrago to let him know that CM will be following up and that he should be contacted by [facility] on Monday. No other needs voiced. His SO will be transporting.    Final Discharge Disposition Code 06 - home with home health care              Continued Care and Services - Admitted Since 8/2/2022     Home Medical Care Coordination complete.    Service Provider Request Status Selected Services Address Phone Fax Patient Preferred    [facility]   Selected Home Health Services [address] [place], [place] [id number] [phone] --              Expected Discharge Date and Time     Expected Discharge Date Expected Discharge Time    Aug 7, 2022          Demographic Summary    No documentation.                Functional Status    No documentation.                Psychosocial    No documentation.                Abuse/Neglect    No documentation.                Legal    No documentation.                Substance Abuse    No documentation.                Patient Forms    No documentation.                   Yaneli Hernandez, LEIGHA

## 2022-08-07 NOTE — DISCHARGE PLACEMENT REQUEST
"Nilton Buitrago (66 y.o. Male)     To [facility]  From Yaneli SANTIAGO RN Case Manager  [phone]    [facility] Summary            Date of Birth   1955    Social Security Number       Address   [address] [place] [address]    Home Phone   [phone]    MRN   [id number]       Christianity   None    Marital Status   Single                            Admission Date   22    Admission Type   Elective    Admitting Provider   Godfrey Lawrence Jr., MD    Attending Provider   Godfrey Lawrence Jr., MD    Department, Room/Bed   [facility] 2F, S216/1       Discharge Date       Discharge Disposition   Home or Self Care    Discharge Destination                               Attending Provider: Godfrey Lawrence Jr., MD    Allergies: Food Color Orange [Yellow Dye], Tetanus Toxoids    Isolation: None   Infection: None   Code Status: CPR   Advance Care Planning Activity    Ht: 182.9 cm (72\")   Wt: 122 kg (268 lb)    Admission Cmt: None   Principal Problem: S/P cysto-prostatectomy with ileal conduit  [Z90.79]                 Active Insurance as of 2022     Primary Coverage     Payor Plan Insurance Group Employer/Plan Group    MISC REFERENCE BASED PRICING MISC REFERENCE BASED PRICING [id number]     Coverage Address Coverage Phone Number Coverage Fax Number Effective Dates    [address] [phone]  2018 - None Entered    [facility] [id number]       Subscriber Name Subscriber Birth Date Member ID       NILTON BUITRAGO [id number]                 Emergency Contacts      (Rel.) Home Phone Work Phone Mobile Phone    SHON GUERRERO (Significant Other) [phone] -- --               Discharge Summary      Rosie Finley APRN at 22 1057          Patient Name: Nilton Buitrago  MRN: [id number]  : 1955  DOS: 2022    Attending: Godfrey Lawrence    Primary Care Provider: Suresh Howard APRN    Date of " Admission:.8/2/2022 11:41 AM    Date of Discharge:  8/7/2022    Discharge Diagnosis:   S/P cysto-prostatectomy with ileal conduit     Malignant neoplasm of posterior wall of urinary bladder (HCC)    COPD (chronic obstructive pulmonary disease) (HCC)    GERD (gastroesophageal reflux disease)    HTN (hypertension)    HLD (hyperlipidemia)    Smoker    Renal insufficiency    Acute postoperative pain    Leukocytosis, mild, likely reactive    Acute blood loss anemia, asymptomatic    Postoperative nausea    Anemia      Hospital Course    At admit:    Patient is a pleasant 66 y.o. male presented for scheduled surgery by Dr. Melinda العلي. He anticipates CYSTO-PROSTATECTOMY, PELVIC LYMPH NODE DISSECTION LAPAROSCOPIC WITH DAVINCI ROBOT, CREATION OF ILEAL CONDUIT today. He started having gross hematuria with urinary retention and passing clots about 2 months ago. He underwent a cystoscopy and was found to have a posterior bladder wall tumor that was obstructing the right ureter. Pathology shower urothelial carcinoma. He denies any other urinary changes.      When seen in pre-op, patient is resting comfortably in bed with significant other at bedside. He denies pain, nausea, sob or chest pain.     After admit:    He was provided pain medication as needed for pain control, he received DVT prophylaxis with mechanicals, and Lovenox.  Adjustments were made to pain medications to optimize postop pain management. Risks and benefits of opiate medications discussed with patient.  Dung report in chart was reviewed prior to discharge.    Postoperatively, he had NGT to suction until he demonstrated bowel function. Once bowel function was established the NGT was discontinued and his diet was advanced. Prior to discharge he is tolerating soft diet and having bowel movements.    He was seen by WOCN for instructions on care of his new appliance.    His home medications were resumed as appropriate.    He was provided a bowel regimen and was  "encouraged to ambulate frequently which he did.  When I saw him today he is doing quite well and he is ready for discharge home.      Procedures Performed    2022     Pre-op Diagnosis:   Muscle invasive bladder cancer     Post-op Diagnosis:     Muscle invasive bladder cancer    Procedure(s):  Radical CYSTO-PROSTATECTOMY, PELVIC LYMPH NODE DISSECTION LAPAROSCOPIC WITH DAVINCI ROBOT, CREATION OF ILEAL CONDUIT     Surgeon(s):  Godfrey Lawrence Jr., MD    Pertinent Test Results:    I reviewed the patient's new clinical results.   Results from last 7 days   Lab Units 22  0808 22  0822  1544   WBC 10*3/mm3 7.09 12.94* 6.48   HEMOGLOBIN g/dL 8.7* 10.8* 13.5   HEMATOCRIT % 26.1* 32.0* 40.0   PLATELETS 10*3/mm3 224 294 248     Results from last 7 days   Lab Units 22  0822  0822  1544   SODIUM mmol/L 142 136 139   POTASSIUM mmol/L 4.1 4.8 4.4   CHLORIDE mmol/L 110* 103 104   CO2 mmol/L 26.0 23.0 26.0   BUN mg/dL 14 17 10   CREATININE mg/dL 1.15 1.49* 1.54*   CALCIUM mg/dL 8.8 8.8 9.6   BILIRUBIN mg/dL  --   --  0.3   ALK PHOS U/L  --   --  88   ALT (SGPT) U/L  --   --  11   AST (SGOT) U/L  --   --  12   GLUCOSE mg/dL 113* 143* 100*     I reviewed the patient's new imaging including images and reports.      Discharge Assessment:    Vital Signs  Visit Vitals  /76 (BP Location: Right arm, Patient Position: Sitting)   Pulse 75   Temp 97.9 °F (36.6 °C) (Oral)   Resp 18   Ht 182.9 cm (72\")   Wt 122 kg (268 lb)   SpO2 93%   BMI 36.35 kg/m²     Temp (24hrs), Av.2 °F (36.8 °C), Min:97.8 °F (36.6 °C), Max:98.7 °F (37.1 °C)      General Appearance:    Alert, cooperative, in no acute distress   Lungs:     Clear to auscultation,respirations regular, even and unlabored    Heart:    Regular rhythm and normal rate, normal S1 and S2    Abdomen:   Soft, benign, clean incisions.  JUDD drain, out prior to discharge if ok with Dr. Lawrence. Ileal conduit draining yellow urine. "   Extremities:   Moves all extremities well, no edema, no cyanosis, no redness   Pulses:   Pulses palpable and equal bilaterally   Skin:   No bleeding, bruising or rash          Discharge Disposition: Home    Discharge Medications     Discharge Medications      New Medications      Instructions Start Date   docusate sodium 100 MG capsule  Commonly known as: Colace   100 mg, Oral, Daily PRN      HYDROcodone-acetaminophen 7.5-325 MG per tablet  Commonly known as: NORCO  Replaces: HYDROcodone-acetaminophen  MG per tablet   1 tablet, Oral, Every 6 Hours PRN      nitrofurantoin (macrocrystal-monohydrate) 100 MG capsule  Commonly known as: Macrobid   100 mg, Oral, 2 Times Daily      polyethylene glycol 17 g packet  Commonly known as: MIRALAX   17 g, Oral, Daily         Continue These Medications      Instructions Start Date   albuterol sulfate  (90 Base) MCG/ACT inhaler  Commonly known as: PROVENTIL HFA;VENTOLIN HFA;PROAIR HFA   2 puffs, Inhalation, Every 4 Hours PRN      Anoro Ellipta 62.5-25 MCG/INH aerosol powder  inhaler  Generic drug: umeclidinium-vilanterol   1 puff, Inhalation, Daily - RT      aspirin 325 MG tablet   325 mg, Oral, Daily      atorvastatin 20 MG tablet  Commonly known as: LIPITOR   20 mg, Oral, Daily      famotidine 40 MG tablet  Commonly known as: PEPCID   40 mg, Oral, 2 Times Daily      fexofenadine 180 MG tablet  Commonly known as: ALLEGRA   180 mg, Oral, Daily      gabapentin 600 MG tablet  Commonly known as: NEURONTIN   600 mg, Oral, 3 Times Daily      losartan 50 MG tablet  Commonly known as: COZAAR   50 mg, Oral, Daily      montelukast 10 MG tablet  Commonly known as: SINGULAIR   10 mg, Oral, Nightly      multivitamin with minerals tablet tablet   1 tablet, Oral, Daily      nicotine 14 MG/24HR patch  Commonly known as: NICODERM CQ   1 patch, Transdermal, Every 24 Hours      tiZANidine 4 MG tablet  Commonly known as: ZANAFLEX   4 mg, Oral, Every 8 Hours PRN      verapamil  MG  24 hr capsule  Commonly known as: VERELAN   240 mg, Oral, Nightly         Stop These Medications    HYDROcodone-acetaminophen  MG per tablet  Commonly known as: NORCO  Replaced by: HYDROcodone-acetaminophen 7.5-325 MG per tablet            Discharge Diet: Soft, bland diet    Activity at Discharge: ambulate    Follow-up Appointments  Dr. Melinda Garrison per his orders       ALLEN Mccoy  08/07/22  10:57 EDT    Electronically signed by Rosie Finley APRN at 08/07/22 5943

## 2022-08-08 NOTE — OUTREACH NOTE
Prep Survey    Flowsheet Row Responses   [facility] facility patient discharged from? [facility]   Is LACE score < 7 ? No   Emergency Room discharge w/ pulse ox? No   Eligibility Readm Mgmt   Discharge diagnosis /P cysto-prostatectomy with ileal conduit Malignant neoplasm of posterior wall of urinary bladder    Does the patient have one of the following disease processes/diagnoses(primary or secondary)? General Surgery   Does the patient have Home health ordered? Yes   What is the Home health agency?   [facility]    Is there a DME ordered? No   Prep survey completed? Yes          NIKIA DOYLE - Registered Nurse

## 2022-08-10 ENCOUNTER — READMISSION MANAGEMENT (OUTPATIENT)
Dept: CALL CENTER | Facility: HOSPITAL | Age: 67
End: 2022-08-10

## 2022-08-10 NOTE — OUTREACH NOTE
General Surgery Week 1 Survey    Flowsheet Row Responses   [facility] patient discharged from? [facility]   Does the patient have one of the following disease processes/diagnoses(primary or secondary)? General Surgery   Week 1 attempt successful? Yes   Call start time 1049   Call end time 1051   Meds reviewed with patient/caregiver? Yes   Is the patient having any side effects they believe may be caused by any medication additions or changes? No   Does the patient have all medications related to this admission filled (includes all antibiotics, pain medications, etc.) Yes   Is the patient taking all medications as directed (includes completed medication regime)? Yes   Does the patient have a follow up appointment scheduled with their surgeon? Yes   Has the patient kept scheduled appointments due by today? N/A   What is the Home health agency?   [facility]    Has home health visited the patient within 72 hours of discharge? Yes   Has all DME been delivered? No   Did the patient receive a copy of their discharge instructions? Yes   Nursing interventions Reviewed instructions with patient   What is the patient's perception of their health status since discharge? Improving   Nursing interventions Nurse provided patient education   Is the patient /caregiver able to teach back basic post-op care? Drive as instructed by MD in discharge instructions, Take showers only when approved by MD-sponge bathe until then, No tub bath, swimming, or hot tub until instructed by MD, Keep incision areas clean,dry and protected, Do not remove steri-strips, Lifting as instructed by MD in discharge instructions   Is the patient/caregiver able to teach back signs and symptoms of incisional infection? Increased drainage or bleeding, Increased redness, swelling or pain at the incisonal site, Incisional warmth, Pus or odor from incision, Fever   Is the patient/caregiver able to teach back steps to recovery at home? Set small, achievable goals  for return to baseline health, Rest and rebuild strength, gradually increase activity, Make a list of questions for surgeon's appointment   If the patient is a current smoker, are they able to teach back resources for cessation? Not a smoker   Is the patient/caregiver able to teach back the hierarchy of who to call/visit for symptoms/problems? PCP, Specialist, Home health nurse, Urgent Care, ED, 911 Yes   Week 1 call completed? Yes          ELVIRA Paul Licensed Nurse

## 2022-08-25 ENCOUNTER — LAB (OUTPATIENT)
Dept: LAB | Facility: HOSPITAL | Age: 67
End: 2022-08-25

## 2022-08-25 ENCOUNTER — CONSULT (OUTPATIENT)
Dept: ONCOLOGY | Facility: CLINIC | Age: 67
End: 2022-08-25

## 2022-08-25 VITALS
WEIGHT: 265 LBS | TEMPERATURE: 96.9 F | DIASTOLIC BLOOD PRESSURE: 77 MMHG | OXYGEN SATURATION: 98 % | SYSTOLIC BLOOD PRESSURE: 125 MMHG | HEART RATE: 81 BPM | BODY MASS INDEX: 37.94 KG/M2 | HEIGHT: 70 IN | RESPIRATION RATE: 18 BRPM

## 2022-08-25 DIAGNOSIS — C67.4 MALIGNANT NEOPLASM OF POSTERIOR WALL OF URINARY BLADDER: Primary | ICD-10-CM

## 2022-08-25 PROCEDURE — 99204 OFFICE O/P NEW MOD 45 MIN: CPT | Performed by: INTERNAL MEDICINE

## 2022-08-25 RX ORDER — ONDANSETRON HYDROCHLORIDE 8 MG/1
8 TABLET, FILM COATED ORAL 3 TIMES DAILY PRN
Qty: 30 TABLET | Refills: 5 | Status: SHIPPED | OUTPATIENT
Start: 2022-08-25

## 2022-08-25 RX ORDER — SODIUM CHLORIDE 9 MG/ML
250 INJECTION, SOLUTION INTRAVENOUS ONCE
Status: CANCELLED | OUTPATIENT
Start: 2022-09-09

## 2022-08-25 RX ORDER — SODIUM CHLORIDE 9 MG/ML
250 INJECTION, SOLUTION INTRAVENOUS ONCE
Status: CANCELLED | OUTPATIENT
Start: 2022-10-07

## 2022-08-25 NOTE — PROGRESS NOTES
ID: 66 y.o. year old male from [facility] [id number]    PCP: Suresh Howard APRN    REFERRING PHYSICIAN: Godfrey Lawrence Jr, MD    Reason for Consultation: Stage IIIA high-grade urothelial carcinoma of the bladder s/p cystectomy    Dear Dr. Lawrence     It is a pleasure to meet Mr. Buitrago today.  He is a very pleasant 66-year-old gentleman who presents today for consultation for newly diagnosed stage III high-grade urothelial carcinoma of the bladder.  He presented with significant issues with hematuria and blood clots.  Subsequently was biopsied and was found to have an invasive cancer.  He underwent cystectomy on August 8.  Tumor was at T3b N0 M0 making it a stage IIIa.  He is recovered from his surgery well.  He otherwise seems to be in good health with minimal chronic medical issues.  He lives in [facility].  Denies any recent weight loss.  No issues with significant escalation in pain.            Past Medical History:   Diagnosis Date   • Bladder cancer (HCC)    • COPD (chronic obstructive pulmonary disease) (HCC)    • GERD (gastroesophageal reflux disease)    • Hyperlipidemia    • Hypertension    • Postoperative nausea 8/3/2022       Past Surgical History:   Procedure Laterality Date   • COLONOSCOPY      2018   • CYSTECTOMY N/A 8/2/2022    Procedure: CYSTO-PROSTATECTOMY, PELVIC LYMPH NODE DISSECTION LAPAROSCOPIC WITH DAVINCI ROBOT, CREATION OF ILEAL CONDUIT;  Surgeon: Godfrey Lawrence Jr., MD;  Location: [facility];  Service: Robotics - DaVinci;  Laterality: N/A;   • CYSTOSCOPY BLADDER BIOPSY         Social History     Socioeconomic History   • Marital status: Single   Tobacco Use   • Smoking status: Current Every Day Smoker     Packs/day: 1.00     Years: 50.00     Pack years: 50.00     Types: Cigarettes   • Smokeless tobacco: Never Used   Vaping Use   • Vaping Use: Never used   Substance and Sexual Activity   • Alcohol use: Yes     Comment: occ   • Drug use: Not Currently   • Sexual  activity: Defer       Family History   Problem Relation Age of Onset   • Cancer Brother    • Throat cancer Brother        Review of Systems:    16 point review of systems was performed and reviewed and scanned into the EMR    Review of Systems   Constitutional: Negative.    Eyes: Negative.    Respiratory: Positive for shortness of breath.    Cardiovascular: Negative.    Endocrine: Negative.    Genitourinary: Negative.     Musculoskeletal: Negative.    Skin: Negative.    Neurological: Negative.    Hematological: Negative.    Psychiatric/Behavioral: Negative.          Current Outpatient Medications:   •  aspirin 325 MG tablet, Take 325 mg by mouth Daily., Disp: , Rfl:   •  atorvastatin (LIPITOR) 20 MG tablet, Take 20 mg by mouth Daily., Disp: , Rfl:   •  docusate sodium (Colace) 100 MG capsule, Take 1 capsule by mouth Daily As Needed for Constipation., Disp: 30 capsule, Rfl: 1  •  famotidine (PEPCID) 40 MG tablet, Take 40 mg by mouth 2 (Two) Times a Day., Disp: , Rfl:   •  fexofenadine (ALLEGRA) 180 MG tablet, Take 180 mg by mouth Daily., Disp: , Rfl:   •  gabapentin (NEURONTIN) 600 MG tablet, Take 600 mg by mouth 3 (Three) Times a Day., Disp: , Rfl:   •  HYDROcodone-acetaminophen (NORCO) 7.5-325 MG per tablet, Take 1 tablet by mouth Every 6 (Six) Hours As Needed for Moderate Pain ., Disp: 30 tablet, Rfl: 0  •  losartan (COZAAR) 50 MG tablet, Take 50 mg by mouth Daily., Disp: , Rfl:   •  montelukast (SINGULAIR) 10 MG tablet, Take 10 mg by mouth Every Night., Disp: , Rfl:   •  multivitamin with minerals tablet tablet, Take 1 tablet by mouth Daily., Disp: , Rfl:   •  nicotine (NICODERM CQ) 14 MG/24HR patch, Place 1 patch on the skin as directed by provider Daily., Disp: , Rfl:   •  nitrofurantoin, macrocrystal-monohydrate, (Macrobid) 100 MG capsule, Take 1 capsule by mouth 2 (Two) Times a Day., Disp: 14 capsule, Rfl: 0  •  polyethylene glycol (MIRALAX) 17 g packet, Take 17 g by mouth Daily., Disp: 30 packet, Rfl: 0  •   tiZANidine (ZANAFLEX) 4 MG tablet, Take 4 mg by mouth Every 8 (Eight) Hours As Needed., Disp: , Rfl:   •  verapamil ER (VERELAN) 240 MG 24 hr capsule, Take 240 mg by mouth Every Night., Disp: , Rfl:   •  albuterol sulfate  (90 Base) MCG/ACT inhaler, Inhale 2 puffs Every 4 (Four) Hours As Needed for Wheezing., Disp: , Rfl:   •  Anoro Ellipta 62.5-25 MCG/INH aerosol powder  inhaler, Inhale 1 puff Daily., Disp: , Rfl:   •  ondansetron (ZOFRAN) 8 MG tablet, Take 1 tablet by mouth 3 (Three) Times a Day As Needed for Nausea or Vomiting., Disp: 30 tablet, Rfl: 5    Pain Medications             aspirin 325 MG tablet Take 325 mg by mouth Daily.    gabapentin (NEURONTIN) 600 MG tablet Take 600 mg by mouth 3 (Three) Times a Day.    HYDROcodone-acetaminophen (NORCO) 7.5-325 MG per tablet Take 1 tablet by mouth Every 6 (Six) Hours As Needed for Moderate Pain .    tiZANidine (ZANAFLEX) 4 MG tablet Take 4 mg by mouth Every 8 (Eight) Hours As Needed.           Allergies   Allergen Reactions   • Fluticasone-Salmeterol Other (See Comments)     Thrush   • Food Color Orange [Yellow Dye] Nausea Only   • Naproxen Nausea Only   • Orange Flavor GI Intolerance   • Spiriva Respimat [Tiotropium Bromide Monohydrate] Other (See Comments)     Thrush   • Tetanus Toxoids Swelling         ECOG score: 0           Objective     Vitals:    08/25/22 1441   BP: 125/77   Pulse: 81   Resp: 18   Temp: 96.9 °F (36.1 °C)   SpO2: 98%     Body mass index is 38.3 kg/m².  Body surface area is 2.34 meters squared.        08/25/22 1441   Weight: 120 kg (265 lb)     Pain Score    08/25/22 1441   PainSc:   4   PainLoc: Abdomen          Physical Exam    General: well appearing, in no acute distress  HEENT: sclera anicteric, oropharynx clear, neck is supple  Lymphatics: no cervical, supraclavicular, or axillary adenopathy  Cardiovascular: regular rate and rhythm, no murmurs, rubs or gallops  Lungs: clear to auscultation bilaterally  Abdomen: soft, nontender,  nondistended.  No palpable organomegaly, urostomy bag in place  Extremities: no lower extremity edema  Skin: no rashes, lesions, bruising, or petechiae  Msk:  Shows no weakness of the large muscle groups  Psych: Mood is stable        Lab Results   Component Value Date    GLUCOSE 113 (H) 08/05/2022    BUN 14 08/05/2022    CREATININE 1.15 08/05/2022     08/05/2022    K 4.1 08/05/2022     (H) 08/05/2022    CO2 26.0 08/05/2022    CALCIUM 8.8 08/05/2022    PROTEINTOT 6.9 08/01/2022    ALBUMIN 4.40 08/01/2022    BILITOT 0.3 08/01/2022    ALKPHOS 88 08/01/2022    AST 12 08/01/2022    ALT 11 08/01/2022       Lab Results   Component Value Date    HGB 8.7 (L) 08/05/2022    HCT 26.1 (L) 08/05/2022    MCV 89.1 08/05/2022     08/05/2022    WBC 7.09 08/05/2022         Assessment      1.  Stage III urothelial carcinoma of the bladder.  I discussed with him the concept of adjuvant therapy.  The NCCN recommends adjuvant cisplatin based therapy or if ineligible for cisplatin then 1 year of nivolumab.  With having some kidney dysfunction and decreased GFR I think he would be better served by being treated by nivolumab.  He has had his creatinine that fluctuates between 1.5 and 1.1.  I discussed the rationale of the treatment.  I think he would be a good candidate for adjuvant therapy.  I am going to check a PET scan just to make sure he does not have any distant disease.  I will also send circulating DNA to see if he would be considered high risk for recurrence.  I discussed the side effects of the treatment itself.  I was able to answer all the questions he had for me.  I plan to start him on treatment in couple of weeks.          Thank you for allowing me to participate in the care of this patient.    Yours sincerely,    Beverly Valladares MD  [facility]  Hematology and Oncology    Return on: 10/07/22  Return in (Approximately): Schedule with next infusion    Orders Placed This Encounter    Procedures   • NM Pet Skull Base To Mid Thigh     Standing Status:   Future     Standing Expiration Date:   8/25/2023     Order Specific Question:   What radiopharmaceutical is preferred for this exam?     Answer:   FDG  (offered at all sites)     Order Specific Question:   Is the patient a diabetic?     Answer:   No     Order Specific Question:   Release to patient     Answer:   Immediate   • Comprehensive metabolic panel     Standing Status:   Future     Standing Expiration Date:   9/9/2023     Order Specific Question:   Release to patient     Answer:   Routine Release   • TSH     Standing Status:   Future     Standing Expiration Date:   9/9/2023     Order Specific Question:   Release to patient     Answer:   Routine Release   • T4, free     Standing Status:   Future     Standing Expiration Date:   9/9/2023     Order Specific Question:   Release to patient     Answer:   Routine Release   • Comprehensive metabolic panel     Standing Status:   Future     Standing Expiration Date:   10/7/2023     Order Specific Question:   Release to patient     Answer:   Routine Release   • TSH     Standing Status:   Future     Standing Expiration Date:   10/7/2023     Order Specific Question:   Release to patient     Answer:   Routine Release   • T4, free     Standing Status:   Future     Standing Expiration Date:   10/7/2023     Order Specific Question:   Release to patient     Answer:   Routine Release   • CBC and Differential     Standing Status:   Future     Standing Expiration Date:   9/9/2023     Order Specific Question:   Manual Differential     Answer:   No     Order Specific Question:   Release to patient     Answer:   Routine Release   • CBC and Differential     Standing Status:   Future     Standing Expiration Date:   10/7/2023     Order Specific Question:   Manual Differential     Answer:   No     Order Specific Question:   Release to patient     Answer:   Routine Release

## 2022-09-08 ENCOUNTER — TELEPHONE (OUTPATIENT)
Dept: ONCOLOGY | Facility: CLINIC | Age: 67
End: 2022-09-08

## 2022-09-08 ENCOUNTER — OFFICE VISIT (OUTPATIENT)
Dept: ONCOLOGY | Facility: CLINIC | Age: 67
End: 2022-09-08

## 2022-09-08 VITALS
HEART RATE: 97 BPM | OXYGEN SATURATION: 98 % | TEMPERATURE: 97.5 F | WEIGHT: 256 LBS | DIASTOLIC BLOOD PRESSURE: 63 MMHG | RESPIRATION RATE: 20 BRPM | BODY MASS INDEX: 34.67 KG/M2 | HEIGHT: 72 IN | SYSTOLIC BLOOD PRESSURE: 132 MMHG

## 2022-09-08 DIAGNOSIS — C67.4 MALIGNANT NEOPLASM OF POSTERIOR WALL OF URINARY BLADDER: Primary | ICD-10-CM

## 2022-09-08 PROCEDURE — 99215 OFFICE O/P EST HI 40 MIN: CPT | Performed by: NURSE PRACTITIONER

## 2022-09-08 NOTE — TELEPHONE ENCOUNTER
Email sent to Lionel Squires Financial Counselor requesting she contact patient by phone to discuss finances.      An in-basket was sent to Jasmine Camarena requesting help applying for free opdivo.

## 2022-09-08 NOTE — PROGRESS NOTES
"CHEMOTHERAPY PREPARATION    Tru Buitrago  [id number]  1955    Subjective   Chief Complaint: Treatment Preparation and Needs Assessment    History of present illness:  Tru Buitrago is a 66 y.o. year old male who is here today for chemotherapy preparation and needs assessment. The patient has been diagnosed with Stage IIIA high-grade urothelial carcinoma of the bladder s/p cystectomy and is scheduled to begin  IV treatment with nivolumab.  He does have complaints of right knee pain that has been worked up and was negative for clot approximately 2 weeks ago.    Oncology History:    Oncology/Hematology History   Malignant neoplasm of posterior wall of urinary bladder (HCC)   6/2/2022 Cancer Staged    Staging form: Urinary Bladder, AJCC 8th Edition  - Clinical stage from 6/2/2022: Stage II (cT2, cN0, cM0) - Signed by Kev Cobb MD on 6/29/2022 6/29/2022 Initial Diagnosis    Malignant neoplasm of posterior wall of urinary bladder (HCC)     8/2/2022 Cancer Staged    Staging form: Urinary Bladder, AJCC 8th Edition  - Pathologic stage from 8/2/2022: Stage IIIA (pT3b, pN0, cM0) - Signed by Beverly Valladares MD on 8/25/2022 9/9/2022 -  Chemotherapy    OP BLADDER Nivolumab 480 mg         The current medication list and allergy list were reviewed and reconciled.     Past Medical History, Past Surgical History, Social History, Family History have been reviewed and are without significant changes except as mentioned.      Review of Systems   Respiratory: Positive for cough.    Gastrointestinal: Positive for abdominal pain.   Musculoskeletal: Positive for arthralgias and joint swelling.       Objective   Physical Exam  Vital Signs: /63   Pulse 97   Temp 97.5 °F (36.4 °C) (Temporal)   Resp 20   Ht 182.9 cm (72\")   Wt 116 kg (256 lb)   SpO2 98%   BMI 34.72 kg/m²    General Appearance:  alert, cooperative, no apparent distress and appears stated age "   Neurologic/Psychiatric: A&O x 3, gait steady, appropriate affect   HEENT:  Normocephalic, without obvious abnormality, mucous membranes moist   Lungs:   Clear to auscultation bilaterally; respirations regular, even, and unlabored bilaterally   Heart:  Regular rate and rhythm, no murmurs appreciated   Extremities: Normal, atraumatic; no clubbing, cyanosis, or edema    Skin: No rashes, lesions, or abnormal coloration noted     ECOG Performance Status: 1 - Symptomatic but completely ambulatory            NEEDS ASSESSMENTS    Genetics  The patient's new diagnosis and family history have been reviewed for genetic counseling needs. A genetic referral is not recommended.     Psychosocial  The patient has completed a PHQ-9 Depression Screening and the Distress Thermometer (DT) today.   PHQ-9 results show 1-4 (Minimal Depression). The patient scored their distress today as 0 on a scale of 0-10 with 0 being no distress and 10 being extreme distress.   Problems marked as being an issue for him within the last week include practical problems and physical problems.   Results were reviewed along with psychosocial resources offered by our cancer center. Our oncology social worker will be flagged for a DT score of 4 or above, and a same day call will be made for a score of 9 or 10. A mental health referral is not recommended at this time. The patient is not accepting of a referral to [facility].   Copies of patient's questionnaires will be scanned into EMR for details and further reference.    Barriers to care  A barriers form was also completed by the patient today. We discussed services offered by our facility to help him have adequate access to care. The patient was given the name and card for our Oncology Social Worker, Yuli Aguilar. Based upon barriers assessment today, the patient will not require a follow-up call from the  to further discuss needs.   A copy of the barriers form will also be scanned  "into EMR for details and further reference.     VAD Assessment  The patient and I discussed planned intervenous chemotherapy as well as other IV treatments that are often needed throughout the course of treatment. These may include, but are not limited to blood transfusions, antibiotics, and IV hydration. The patient's vasculature does appear to be adequate for multiple peripheral IVs throughout their treatment course. Discussed risks and benefits of VADs. The patient would not like to pursue Port-A-Cath insertion prior to initiation of treatment.       Advance Care Planning   The patient and I discussed advanced care planning, \"Conversations that Matter\".   This service was offered, free of charge, for development of advance directives with a certified ACP facilitator.  The patient does not have an up-to-date advanced directive. This document is not on file with our office. The patient is not interested in an appointment with one of our facilitators to create or update their advanced directives.         Palliative Care  The patient and I discussed palliative care services. Palliative care is not the same as Hospice care. This is specialized medical care for people living with serious illness with the goal of improving quality of life for the patient and their family. [facility] has partnered with [facility] to offer our patients outpatient palliative care early along with their treatment to assist in coordination of care, symptom management, pain management, and medical decision making.  Oncology criteria for palliative care referral is not met at this time. The patient is not interested in a palliative care consultation.     Additional Referral needs  Dietician      IV CHEMOTHERAPY EDUCATION    Booklets Given: Chemotherapy and You [x]  Eating Hints [x]    Sexuality/Fertility Books []      Chemotherapy/Biotherapy Education Sheets: (list all that apply)  nausea management, acid reflux management, " diarrhea management, Cancer resourse contacts information, skin and mouth care and vaccination information                                                                                                                                                                 Chemotherapy Regimen:   Treatment Plans     Name Type Plan Dates Plan Provider         Active    OP BLADDER Nivolumab 480 mg ONCOLOGY TREATMENT  9/8/2022 - Present Beverly Valladares MD                    Chemotherapy education comprehension reviewed. Questions answered and additional information discussed on topics including:  Anemia, Thrombocytopenia, Neutropenia, Nutrition and appetite changes, Constipation, Diarrhea, Nausea & vomiting, Mouth sores, Alopecia, Infertility & sexuality, Nervous system changes, Pain, Skin & nail changes, Organ toxicities, Survivorship, Home care and Vaccinations        Assessment and Plan:    Diagnoses and all orders for this visit:    1. Malignant neoplasm of posterior wall of urinary bladder (HCC) (Primary)  -     Uric Acid; Future        This was a 60 minute face-to-face visit with 55 minutes spent in  counseling and coordination of care as documented above.   The patient and I have reviewed their new cancer diagnosis and scheduled treatment plan. Needs assessment was completed including genetics, psychosocial needs, barriers to care, VAD evaluation, advanced care planning, and palliative care services. Referrals have been ordered as appropriate based upon our evaluation and patient desires.     IV chemotherapy teaching was also completed today as documented above. Adequate time was given to answer all questions to his satisfaction. Patient and family are aware of their care team members and contact information if they have questions or problems throughout the treatment course. Needs assessments and education has been completed. The patient is adequately prepared to begin treatment as scheduled.     Dietitian will  see patient tomorrow on day 1 of treatment.  Declined referral to social work and mental health nurse practitioner.  I will refer to financial counseling as well as our financial affiliate to see if he qualifies for free drug.    We discussed Zofran was sent to his pharmacy.  He will take 1 tablet by mouth every 8 hours as needed for nausea and vomiting.    Electronically signed by [facility] Crabtree on 09/08/22 at 12:24 EDT.

## 2022-09-09 ENCOUNTER — INFUSION (OUTPATIENT)
Dept: ONCOLOGY | Facility: HOSPITAL | Age: 67
End: 2022-09-09

## 2022-09-09 VITALS
BODY MASS INDEX: 34.98 KG/M2 | HEART RATE: 80 BPM | TEMPERATURE: 97.8 F | DIASTOLIC BLOOD PRESSURE: 75 MMHG | WEIGHT: 257.9 LBS | SYSTOLIC BLOOD PRESSURE: 157 MMHG

## 2022-09-09 DIAGNOSIS — C67.4 MALIGNANT NEOPLASM OF POSTERIOR WALL OF URINARY BLADDER: Primary | ICD-10-CM

## 2022-09-09 LAB
ALBUMIN SERPL-MCNC: 3.7 G/DL (ref 3.5–5.2)
ALBUMIN/GLOB SERPL: 1.5 G/DL
ALP SERPL-CCNC: 93 U/L (ref 39–117)
ALT SERPL W P-5'-P-CCNC: 6 U/L (ref 1–41)
ANION GAP SERPL CALCULATED.3IONS-SCNC: 9.3 MMOL/L (ref 5–15)
AST SERPL-CCNC: 9 U/L (ref 1–40)
BASOPHILS # BLD AUTO: 0.07 10*3/MM3 (ref 0–0.2)
BASOPHILS NFR BLD AUTO: 1 % (ref 0–1.5)
BILIRUB SERPL-MCNC: 0.2 MG/DL (ref 0–1.2)
BUN SERPL-MCNC: 11 MG/DL (ref 8–23)
BUN/CREAT SERPL: 7.3 (ref 7–25)
CALCIUM SPEC-SCNC: 9.3 MG/DL (ref 8.6–10.5)
CHLORIDE SERPL-SCNC: 106 MMOL/L (ref 98–107)
CO2 SERPL-SCNC: 21.7 MMOL/L (ref 22–29)
CREAT SERPL-MCNC: 1.5 MG/DL (ref 0.76–1.27)
DEPRECATED RDW RBC AUTO: 45.1 FL (ref 37–54)
EGFRCR SERPLBLD CKD-EPI 2021: 51.7 ML/MIN/1.73
EOSINOPHIL # BLD AUTO: 0.37 10*3/MM3 (ref 0–0.4)
EOSINOPHIL NFR BLD AUTO: 5.5 % (ref 0.3–6.2)
ERYTHROCYTE [DISTWIDTH] IN BLOOD BY AUTOMATED COUNT: 14.6 % (ref 12.3–15.4)
GLOBULIN UR ELPH-MCNC: 2.5 GM/DL
GLUCOSE SERPL-MCNC: 79 MG/DL (ref 65–99)
HCT VFR BLD AUTO: 33.8 % (ref 37.5–51)
HGB BLD-MCNC: 11.1 G/DL (ref 13–17.7)
IMM GRANULOCYTES # BLD AUTO: 0.03 10*3/MM3 (ref 0–0.05)
IMM GRANULOCYTES NFR BLD AUTO: 0.4 % (ref 0–0.5)
LYMPHOCYTES # BLD AUTO: 1.31 10*3/MM3 (ref 0.7–3.1)
LYMPHOCYTES NFR BLD AUTO: 19.6 % (ref 19.6–45.3)
MCH RBC QN AUTO: 27.7 PG (ref 26.6–33)
MCHC RBC AUTO-ENTMCNC: 32.8 G/DL (ref 31.5–35.7)
MCV RBC AUTO: 84.3 FL (ref 79–97)
MONOCYTES # BLD AUTO: 0.62 10*3/MM3 (ref 0.1–0.9)
MONOCYTES NFR BLD AUTO: 9.3 % (ref 5–12)
NEUTROPHILS NFR BLD AUTO: 4.29 10*3/MM3 (ref 1.7–7)
NEUTROPHILS NFR BLD AUTO: 64.2 % (ref 42.7–76)
NRBC BLD AUTO-RTO: 0 /100 WBC (ref 0–0.2)
PLATELET # BLD AUTO: 303 10*3/MM3 (ref 140–450)
PMV BLD AUTO: 8.4 FL (ref 6–12)
POTASSIUM SERPL-SCNC: 4.6 MMOL/L (ref 3.5–5.2)
PROT SERPL-MCNC: 6.2 G/DL (ref 6–8.5)
RBC # BLD AUTO: 4.01 10*6/MM3 (ref 4.14–5.8)
SODIUM SERPL-SCNC: 137 MMOL/L (ref 136–145)
T4 FREE SERPL-MCNC: 1.07 NG/DL (ref 0.93–1.7)
TSH SERPL DL<=0.05 MIU/L-ACNC: 2.2 UIU/ML (ref 0.27–4.2)
URATE SERPL-MCNC: 6.2 MG/DL (ref 3.4–7)
WBC NRBC COR # BLD: 6.69 10*3/MM3 (ref 3.4–10.8)

## 2022-09-09 PROCEDURE — 84443 ASSAY THYROID STIM HORMONE: CPT

## 2022-09-09 PROCEDURE — 25010000002 NIVOLUMAB 240 MG/24ML SOLUTION 24 ML VIAL: Performed by: INTERNAL MEDICINE

## 2022-09-09 PROCEDURE — 96413 CHEMO IV INFUSION 1 HR: CPT

## 2022-09-09 PROCEDURE — 36415 COLL VENOUS BLD VENIPUNCTURE: CPT

## 2022-09-09 PROCEDURE — 80053 COMPREHEN METABOLIC PANEL: CPT

## 2022-09-09 PROCEDURE — 84439 ASSAY OF FREE THYROXINE: CPT

## 2022-09-09 PROCEDURE — 85025 COMPLETE CBC W/AUTO DIFF WBC: CPT

## 2022-09-09 PROCEDURE — 84550 ASSAY OF BLOOD/URIC ACID: CPT

## 2022-09-09 RX ORDER — SODIUM CHLORIDE 9 MG/ML
250 INJECTION, SOLUTION INTRAVENOUS ONCE
Status: DISCONTINUED | OUTPATIENT
Start: 2022-09-09 | End: 2022-09-09 | Stop reason: HOSPADM

## 2022-09-09 RX ADMIN — SODIUM CHLORIDE 480 MG: 9 INJECTION, SOLUTION INTRAVENOUS at 10:49

## 2022-09-12 ENCOUNTER — DOCUMENTATION (OUTPATIENT)
Dept: NUTRITION | Facility: HOSPITAL | Age: 67
End: 2022-09-12

## 2022-09-12 NOTE — PROGRESS NOTES
Oncology Nutrition Screening    Patient Name:  Tru Buitrago  YOB: 1955  MRN: [id number]  Date:  09/12/22  Physician:  Blaine    Spoke w/ pt at time of initial nutrition assessment regarding oncology treatment. Pt denies any recent wt or appetite changes. RD explained to pt that it is important to maintain current wt during this time. RD encouraged pt to stay hydrated throughout the day by drinking water w/ electrolytes or drinks w/ electrolytes like Gatorade Zero. Pt denies any nutrition-related questions/concerns at this time. RD to follow-up w/ pt in 3 weeks. RD available PRN.     Type of Cancer Treatment:   Chemotherapy:    Patient Active Problem List   Diagnosis   • Malignant neoplasm of posterior wall of urinary bladder (HCC)   • COPD (chronic obstructive pulmonary disease) (HCC)   • GERD (gastroesophageal reflux disease)   • HTN (hypertension)   • HLD (hyperlipidemia)   • Smoker   • Renal insufficiency   • S/P cysto-prostatectomy with ileal conduit    • Acute postoperative pain   • Leukocytosis, mild, likely reactive   • Acute blood loss anemia, asymptomatic   • Postoperative nausea   • Anemia       Current Outpatient Medications   Medication Sig Dispense Refill   • albuterol sulfate  (90 Base) MCG/ACT inhaler Inhale 2 puffs Every 4 (Four) Hours As Needed for Wheezing.     • Anoro Ellipta 62.5-25 MCG/INH aerosol powder  inhaler Inhale 1 puff Daily.     • aspirin 325 MG tablet Take 325 mg by mouth Daily.     • atorvastatin (LIPITOR) 20 MG tablet Take 20 mg by mouth Daily.     • docusate sodium (Colace) 100 MG capsule Take 1 capsule by mouth Daily As Needed for Constipation. 30 capsule 1   • famotidine (PEPCID) 40 MG tablet Take 40 mg by mouth 2 (Two) Times a Day.     • fexofenadine (ALLEGRA) 180 MG tablet Take 180 mg by mouth Daily.     • gabapentin (NEURONTIN) 600 MG tablet Take 600 mg by mouth 3 (Three) Times a Day.     • HYDROcodone-acetaminophen (NORCO) 7.5-325 MG per  tablet Take 1 tablet by mouth Every 6 (Six) Hours As Needed for Moderate Pain . 30 tablet 0   • losartan (COZAAR) 50 MG tablet Take 50 mg by mouth Daily.     • montelukast (SINGULAIR) 10 MG tablet Take 10 mg by mouth Every Night.     • multivitamin with minerals tablet tablet Take 1 tablet by mouth Daily.     • nicotine (NICODERM CQ) 14 MG/24HR patch Place 1 patch on the skin as directed by provider Daily.     • ondansetron (ZOFRAN) 8 MG tablet Take 1 tablet by mouth 3 (Three) Times a Day As Needed for Nausea or Vomiting. 30 tablet 5   • polyethylene glycol (MIRALAX) 17 g packet Take 17 g by mouth Daily. 30 packet 0   • tiZANidine (ZANAFLEX) 4 MG tablet Take 4 mg by mouth Every 8 (Eight) Hours As Needed.     • verapamil ER (VERELAN) 240 MG 24 hr capsule Take 240 mg by mouth Every Night.       No current facility-administered medications for this visit.       Glycemic Risk:   N/A    Weight:   Height: 72in  Weight: 257 lbs.  Usual Body Weight: 240 lbs.   BMI: 34.98  Obese  Weight has been stable    Oral Food Intake:  Regular Diet - No Restrictions    Hydration Status:   How many 8 ounce glass of water of fluid do you drink per day?  4    Enteral Feeding:   N/A    Nutrition Symptoms:   No Problems with Eating    Activity:   Normal with no limitations     reports that he has been smoking cigarettes. He has a 50.00 pack-year smoking history. He has never used smokeless tobacco. He reports current alcohol use. He reports previous drug use.    Evaluation of Nutritional Risk:   Patient is not identified as a nutritional risk at time of screening; will follow patient through course of treatment for nutritional consultation as warranted.        Electronically signed by:  CLAIRE ANTOINE RD  13:33 EDT

## 2022-09-20 ENCOUNTER — HOSPITAL ENCOUNTER (OUTPATIENT)
Dept: PET IMAGING | Facility: HOSPITAL | Age: 67
Discharge: HOME | End: 2022-09-20

## 2022-09-20 DIAGNOSIS — C67.4 MALIGNANT NEOPLASM OF POSTERIOR WALL OF URINARY BLADDER: ICD-10-CM

## 2022-09-20 LAB — GLUCOSE BLDC GLUCOMTR-MCNC: 104 MG/DL (ref 70–130)

## 2022-09-20 PROCEDURE — A9552 F18 FDG: HCPCS | Performed by: INTERNAL MEDICINE

## 2022-09-20 PROCEDURE — 78815 PET IMAGE W/CT SKULL-THIGH: CPT

## 2022-09-20 PROCEDURE — 0 FLUDEOXYGLUCOSE F18 SOLUTION: Performed by: INTERNAL MEDICINE

## 2022-09-20 PROCEDURE — 82962 GLUCOSE BLOOD TEST: CPT

## 2022-09-20 RX ADMIN — FLUDEOXYGLUCOSE F18 1 DOSE: 300 INJECTION INTRAVENOUS at 12:15

## 2022-10-07 ENCOUNTER — OFFICE VISIT (OUTPATIENT)
Dept: ONCOLOGY | Facility: CLINIC | Age: 67
End: 2022-10-07

## 2022-10-07 ENCOUNTER — INFUSION (OUTPATIENT)
Dept: ONCOLOGY | Facility: HOSPITAL | Age: 67
End: 2022-10-07

## 2022-10-07 VITALS
WEIGHT: 250 LBS | BODY MASS INDEX: 33.86 KG/M2 | OXYGEN SATURATION: 98 % | RESPIRATION RATE: 20 BRPM | DIASTOLIC BLOOD PRESSURE: 69 MMHG | HEIGHT: 72 IN | SYSTOLIC BLOOD PRESSURE: 121 MMHG | HEART RATE: 78 BPM | TEMPERATURE: 97.5 F

## 2022-10-07 DIAGNOSIS — C67.4 MALIGNANT NEOPLASM OF POSTERIOR WALL OF URINARY BLADDER: Primary | ICD-10-CM

## 2022-10-07 LAB
ALBUMIN SERPL-MCNC: 3.9 G/DL (ref 3.5–5.2)
ALBUMIN/GLOB SERPL: 1.2 G/DL
ALP SERPL-CCNC: 76 U/L (ref 39–117)
ALT SERPL W P-5'-P-CCNC: 9 U/L (ref 1–41)
ANION GAP SERPL CALCULATED.3IONS-SCNC: 10.6 MMOL/L (ref 5–15)
AST SERPL-CCNC: 12 U/L (ref 1–40)
BASOPHILS # BLD AUTO: 0.02 10*3/MM3 (ref 0–0.2)
BASOPHILS NFR BLD AUTO: 0.2 % (ref 0–1.5)
BILIRUB SERPL-MCNC: 0.2 MG/DL (ref 0–1.2)
BUN SERPL-MCNC: 28 MG/DL (ref 8–23)
BUN/CREAT SERPL: 20.6 (ref 7–25)
CALCIUM SPEC-SCNC: 9.4 MG/DL (ref 8.6–10.5)
CHLORIDE SERPL-SCNC: 105 MMOL/L (ref 98–107)
CO2 SERPL-SCNC: 20.4 MMOL/L (ref 22–29)
CREAT SERPL-MCNC: 1.36 MG/DL (ref 0.76–1.27)
DEPRECATED RDW RBC AUTO: 44 FL (ref 37–54)
EGFRCR SERPLBLD CKD-EPI 2021: 58.1 ML/MIN/1.73
EOSINOPHIL # BLD AUTO: 0 10*3/MM3 (ref 0–0.4)
EOSINOPHIL NFR BLD AUTO: 0 % (ref 0.3–6.2)
ERYTHROCYTE [DISTWIDTH] IN BLOOD BY AUTOMATED COUNT: 15.1 % (ref 12.3–15.4)
GLOBULIN UR ELPH-MCNC: 3.3 GM/DL
GLUCOSE SERPL-MCNC: 84 MG/DL (ref 65–99)
HCT VFR BLD AUTO: 33.2 % (ref 37.5–51)
HGB BLD-MCNC: 11 G/DL (ref 13–17.7)
IMM GRANULOCYTES # BLD AUTO: 0.09 10*3/MM3 (ref 0–0.05)
IMM GRANULOCYTES NFR BLD AUTO: 0.7 % (ref 0–0.5)
LYMPHOCYTES # BLD AUTO: 1.08 10*3/MM3 (ref 0.7–3.1)
LYMPHOCYTES NFR BLD AUTO: 8.8 % (ref 19.6–45.3)
MCH RBC QN AUTO: 26.6 PG (ref 26.6–33)
MCHC RBC AUTO-ENTMCNC: 33.1 G/DL (ref 31.5–35.7)
MCV RBC AUTO: 80.4 FL (ref 79–97)
MONOCYTES # BLD AUTO: 0.86 10*3/MM3 (ref 0.1–0.9)
MONOCYTES NFR BLD AUTO: 7 % (ref 5–12)
NEUTROPHILS NFR BLD AUTO: 10.23 10*3/MM3 (ref 1.7–7)
NEUTROPHILS NFR BLD AUTO: 83.3 % (ref 42.7–76)
NRBC BLD AUTO-RTO: 0 /100 WBC (ref 0–0.2)
PLATELET # BLD AUTO: 430 10*3/MM3 (ref 140–450)
PMV BLD AUTO: 8.8 FL (ref 6–12)
POTASSIUM SERPL-SCNC: 4.5 MMOL/L (ref 3.5–5.2)
PROT SERPL-MCNC: 7.2 G/DL (ref 6–8.5)
RBC # BLD AUTO: 4.13 10*6/MM3 (ref 4.14–5.8)
SODIUM SERPL-SCNC: 136 MMOL/L (ref 136–145)
T4 FREE SERPL-MCNC: 1.1 NG/DL (ref 0.93–1.7)
TSH SERPL DL<=0.05 MIU/L-ACNC: 1.3 UIU/ML (ref 0.27–4.2)
WBC NRBC COR # BLD: 12.28 10*3/MM3 (ref 3.4–10.8)

## 2022-10-07 PROCEDURE — 84443 ASSAY THYROID STIM HORMONE: CPT

## 2022-10-07 PROCEDURE — 96413 CHEMO IV INFUSION 1 HR: CPT

## 2022-10-07 PROCEDURE — 36415 COLL VENOUS BLD VENIPUNCTURE: CPT

## 2022-10-07 PROCEDURE — 80053 COMPREHEN METABOLIC PANEL: CPT

## 2022-10-07 PROCEDURE — 25010000002 NIVOLUMAB 240 MG/24ML SOLUTION 24 ML VIAL: Performed by: INTERNAL MEDICINE

## 2022-10-07 PROCEDURE — 99214 OFFICE O/P EST MOD 30 MIN: CPT | Performed by: INTERNAL MEDICINE

## 2022-10-07 PROCEDURE — 84439 ASSAY OF FREE THYROXINE: CPT

## 2022-10-07 PROCEDURE — 85025 COMPLETE CBC W/AUTO DIFF WBC: CPT

## 2022-10-07 RX ORDER — SODIUM CHLORIDE 9 MG/ML
250 INJECTION, SOLUTION INTRAVENOUS ONCE
Status: CANCELLED | OUTPATIENT
Start: 2022-11-04

## 2022-10-07 RX ORDER — SODIUM CHLORIDE 9 MG/ML
250 INJECTION, SOLUTION INTRAVENOUS ONCE
Status: DISCONTINUED | OUTPATIENT
Start: 2022-10-07 | End: 2022-10-07 | Stop reason: HOSPADM

## 2022-10-07 RX ORDER — LEVOFLOXACIN 500 MG/1
TABLET, FILM COATED ORAL
COMMUNITY
Start: 2022-10-04 | End: 2022-11-04

## 2022-10-07 RX ADMIN — SODIUM CHLORIDE 480 MG: 9 INJECTION, SOLUTION INTRAVENOUS at 11:01

## 2022-10-07 NOTE — PROGRESS NOTES
PROBLEM LIST:  Oncology/Hematology History   Malignant neoplasm of posterior wall of urinary bladder (HCC)   6/2/2022 Cancer Staged    Staging form: Urinary Bladder, AJCC 8th Edition  - Clinical stage from 6/2/2022: Stage II (cT2, cN0, cM0) - Signed by Kev Cobb MD on 6/29/2022 6/29/2022 Initial Diagnosis    Malignant neoplasm of posterior wall of urinary bladder (HCC)     8/2/2022 Cancer Staged    Staging form: Urinary Bladder, AJCC 8th Edition  - Pathologic stage from 8/2/2022: Stage IIIA (pT3b, pN0, cM0) - Signed by Beverly Valladares MD on 8/25/2022 9/9/2022 -  Chemotherapy    OP BLADDER Nivolumab 480 mg         REASON FOR VISIT: Management of my bladder cancer    HISTORY OF PRESENT ILLNESS:   66 y.o.  male presents today for follow-up of his bladder cancer.  Tolerating his Nivolumab adjuvantly reasonably well.  Denies any issues with itching.  He did have a UTI for which he is on antibiotics.  Denies nausea or vomiting.  No headaches.    Past medical history, social history and family history was reviewed 10/07/22 and unchanged from prior visit.    Review of Systems:    Review of Systems - Oncology         Medications:        Current Outpatient Medications:   •  albuterol sulfate  (90 Base) MCG/ACT inhaler, Inhale 2 puffs Every 4 (Four) Hours As Needed for Wheezing., Disp: , Rfl:   •  Anoro Ellipta 62.5-25 MCG/INH aerosol powder  inhaler, Inhale 1 puff Daily., Disp: , Rfl:   •  aspirin 325 MG tablet, Take 325 mg by mouth Daily., Disp: , Rfl:   •  atorvastatin (LIPITOR) 20 MG tablet, Take 20 mg by mouth Daily., Disp: , Rfl:   •  docusate sodium (Colace) 100 MG capsule, Take 1 capsule by mouth Daily As Needed for Constipation., Disp: 30 capsule, Rfl: 1  •  famotidine (PEPCID) 40 MG tablet, Take 40 mg by mouth 2 (Two) Times a Day., Disp: , Rfl:   •  fexofenadine (ALLEGRA) 180 MG tablet, Take 180 mg by mouth Daily., Disp: , Rfl:   •  gabapentin (NEURONTIN) 600 MG tablet, Take 600  "mg by mouth 3 (Three) Times a Day., Disp: , Rfl:   •  HYDROcodone-acetaminophen (NORCO) 7.5-325 MG per tablet, Take 1 tablet by mouth Every 6 (Six) Hours As Needed for Moderate Pain ., Disp: 30 tablet, Rfl: 0  •  levoFLOXacin (LEVAQUIN) 500 MG tablet, , Disp: , Rfl:   •  losartan (COZAAR) 50 MG tablet, Take 50 mg by mouth Daily., Disp: , Rfl:   •  montelukast (SINGULAIR) 10 MG tablet, Take 10 mg by mouth Every Night., Disp: , Rfl:   •  multivitamin with minerals tablet tablet, Take 1 tablet by mouth Daily., Disp: , Rfl:   •  nicotine (NICODERM CQ) 14 MG/24HR patch, Place 1 patch on the skin as directed by provider Daily., Disp: , Rfl:   •  ondansetron (ZOFRAN) 8 MG tablet, Take 1 tablet by mouth 3 (Three) Times a Day As Needed for Nausea or Vomiting., Disp: 30 tablet, Rfl: 5  •  polyethylene glycol (MIRALAX) 17 g packet, Take 17 g by mouth Daily., Disp: 30 packet, Rfl: 0  •  tiZANidine (ZANAFLEX) 4 MG tablet, Take 4 mg by mouth Every 8 (Eight) Hours As Needed., Disp: , Rfl:   •  verapamil ER (VERELAN) 240 MG 24 hr capsule, Take 240 mg by mouth Every Night., Disp: , Rfl:     Pain Medications             aspirin 325 MG tablet Take 325 mg by mouth Daily.    gabapentin (NEURONTIN) 600 MG tablet Take 600 mg by mouth 3 (Three) Times a Day.    HYDROcodone-acetaminophen (NORCO) 7.5-325 MG per tablet Take 1 tablet by mouth Every 6 (Six) Hours As Needed for Moderate Pain .    tiZANidine (ZANAFLEX) 4 MG tablet Take 4 mg by mouth Every 8 (Eight) Hours As Needed.             ALLERGIES:    Allergies   Allergen Reactions   • Fluticasone-Salmeterol Unknown - Low Severity     Thrush   • Spiriva Respimat [Tiotropium Bromide Monohydrate] Unknown - Low Severity     Thrush   • Tetanus Toxoids Swelling   • Food Color Orange [Yellow Dye] Nausea Only   • Naproxen Nausea Only   • Orange Flavor GI Intolerance         Physical Exam    VITAL SIGNS:  /69   Pulse 78   Temp 97.5 °F (36.4 °C) (Temporal)   Resp 20   Ht 182.9 cm (72\")   " Wt 113 kg (250 lb)   SpO2 98%   BMI 33.91 kg/m²                 Wt Readings from Last 3 Encounters:   10/07/22 113 kg (250 lb)   09/09/22 117 kg (257 lb 14.4 oz)   09/08/22 116 kg (256 lb)       Body mass index is 33.91 kg/m². Body surface area is 2.34 meters squared.       Performance Status: 0    General: well appearing, in no acute distress  HEENT: sclera anicteric, neck is supple  Lymphatics: no cervical, supraclavicular, or axillary adenopathy  Cardiovascular: regular rate and rhythm, no murmurs, rubs or gallops  Lungs: clear to auscultation bilaterally  Abdomen: soft, nontender, nondistended.  No palpable organomegaly  Extremities: no lower extremity edema  Skin: no rashes, lesions, bruising, or petechiae  Msk:  Shows no weakness of the large muscle groups  Psych: Mood is stable        RECENT LABS:    Lab Results   Component Value Date    HGB 11.1 (L) 09/09/2022    HCT 33.8 (L) 09/09/2022    MCV 84.3 09/09/2022     09/09/2022    WBC 6.69 09/09/2022    NEUTROABS 4.29 09/09/2022    LYMPHSABS 1.31 09/09/2022    MONOSABS 0.62 09/09/2022    EOSABS 0.37 09/09/2022    BASOSABS 0.07 09/09/2022       Lab Results   Component Value Date    GLUCOSE 79 09/09/2022    BUN 11 09/09/2022    CREATININE 1.50 (H) 09/09/2022     09/09/2022    K 4.6 09/09/2022     09/09/2022    CO2 21.7 (L) 09/09/2022    CALCIUM 9.3 09/09/2022    PROTEINTOT 6.2 09/09/2022    ALBUMIN 3.70 09/09/2022    BILITOT 0.2 09/09/2022    ALKPHOS 93 09/09/2022    AST 9 09/09/2022    ALT 6 09/09/2022       NM PET/CT Skull Base to Mid Thigh    Result Date: 9/21/2022   1. Mildly hypermetabolic activity in patchy disease of the right upper and lower lobe, favored to represent mild infection or inflammation. No evidence of underlying mass. 2. Activity that appears to localize to the patient's urostomy, apparently as normal urinary activity. 3. No evidence of hypermetabolic adenopathy, solid organ disease, or other potential metastatic disease.   This report was finalized on 9/21/2022 10:07 PM by Dr. Arnie Martin MD.          Assessment/Plan    1.  Stage IIIa high-grade urothelial carcinoma of the bladder s/p cystectomy.  I reviewed the PET scan with him today.  He has no sign of recurrent disease.  My plan is to continue Nivolumab with no changes.  He is tolerating it well.  Plan to repeat scans likely in early January with a CAT scan.  For now no changes to his treatment plan going forward.        Total time of patient care on day of service including time prior to, face to face with patient, and following visit spent in reviewing records, lab results, imaging studies, discussion with patient, and documentation/charting was > 32 minutes.     Beverly Valladares MD  [facility]    Return on: 11/04/22  Return in (Approximately): 1 month, Schedule with next infusion    Orders Placed This Encounter   Procedures   • Comprehensive metabolic panel   • TSH   • T4, free   • CBC and Differential       10/7/2022

## 2022-11-04 ENCOUNTER — OFFICE VISIT (OUTPATIENT)
Dept: ONCOLOGY | Facility: CLINIC | Age: 67
End: 2022-11-04

## 2022-11-04 ENCOUNTER — INFUSION (OUTPATIENT)
Dept: ONCOLOGY | Facility: HOSPITAL | Age: 67
End: 2022-11-04

## 2022-11-04 VITALS
HEIGHT: 72 IN | HEART RATE: 74 BPM | OXYGEN SATURATION: 98 % | BODY MASS INDEX: 34.54 KG/M2 | RESPIRATION RATE: 20 BRPM | TEMPERATURE: 97.3 F | DIASTOLIC BLOOD PRESSURE: 72 MMHG | WEIGHT: 255 LBS | SYSTOLIC BLOOD PRESSURE: 143 MMHG

## 2022-11-04 DIAGNOSIS — C67.4 MALIGNANT NEOPLASM OF POSTERIOR WALL OF URINARY BLADDER: Primary | ICD-10-CM

## 2022-11-04 LAB
ALBUMIN SERPL-MCNC: 4 G/DL (ref 3.5–5.2)
ALBUMIN/GLOB SERPL: 1.3 G/DL
ALP SERPL-CCNC: 95 U/L (ref 39–117)
ALT SERPL W P-5'-P-CCNC: 8 U/L (ref 1–41)
ANION GAP SERPL CALCULATED.3IONS-SCNC: 9.6 MMOL/L (ref 5–15)
AST SERPL-CCNC: 11 U/L (ref 1–40)
BASOPHILS # BLD AUTO: 0.06 10*3/MM3 (ref 0–0.2)
BASOPHILS NFR BLD AUTO: 0.7 % (ref 0–1.5)
BILIRUB SERPL-MCNC: 0.2 MG/DL (ref 0–1.2)
BUN SERPL-MCNC: 14 MG/DL (ref 8–23)
BUN/CREAT SERPL: 11 (ref 7–25)
CALCIUM SPEC-SCNC: 9 MG/DL (ref 8.6–10.5)
CHLORIDE SERPL-SCNC: 104 MMOL/L (ref 98–107)
CO2 SERPL-SCNC: 23.4 MMOL/L (ref 22–29)
CREAT SERPL-MCNC: 1.27 MG/DL (ref 0.76–1.27)
DEPRECATED RDW RBC AUTO: 46.6 FL (ref 37–54)
EGFRCR SERPLBLD CKD-EPI 2021: 62.7 ML/MIN/1.73
EOSINOPHIL # BLD AUTO: 0.3 10*3/MM3 (ref 0–0.4)
EOSINOPHIL NFR BLD AUTO: 3.5 % (ref 0.3–6.2)
ERYTHROCYTE [DISTWIDTH] IN BLOOD BY AUTOMATED COUNT: 16.2 % (ref 12.3–15.4)
GLOBULIN UR ELPH-MCNC: 3.1 GM/DL
GLUCOSE SERPL-MCNC: 93 MG/DL (ref 65–99)
HCT VFR BLD AUTO: 36.1 % (ref 37.5–51)
HGB BLD-MCNC: 12 G/DL (ref 13–17.7)
IMM GRANULOCYTES # BLD AUTO: 0.04 10*3/MM3 (ref 0–0.05)
IMM GRANULOCYTES NFR BLD AUTO: 0.5 % (ref 0–0.5)
LYMPHOCYTES # BLD AUTO: 1.54 10*3/MM3 (ref 0.7–3.1)
LYMPHOCYTES NFR BLD AUTO: 18 % (ref 19.6–45.3)
MCH RBC QN AUTO: 26.4 PG (ref 26.6–33)
MCHC RBC AUTO-ENTMCNC: 33.2 G/DL (ref 31.5–35.7)
MCV RBC AUTO: 79.5 FL (ref 79–97)
MONOCYTES # BLD AUTO: 0.53 10*3/MM3 (ref 0.1–0.9)
MONOCYTES NFR BLD AUTO: 6.2 % (ref 5–12)
NEUTROPHILS NFR BLD AUTO: 6.08 10*3/MM3 (ref 1.7–7)
NEUTROPHILS NFR BLD AUTO: 71.1 % (ref 42.7–76)
NRBC BLD AUTO-RTO: 0 /100 WBC (ref 0–0.2)
PLATELET # BLD AUTO: 302 10*3/MM3 (ref 140–450)
PMV BLD AUTO: 8.8 FL (ref 6–12)
POTASSIUM SERPL-SCNC: 4.7 MMOL/L (ref 3.5–5.2)
PROT SERPL-MCNC: 7.1 G/DL (ref 6–8.5)
RBC # BLD AUTO: 4.54 10*6/MM3 (ref 4.14–5.8)
SODIUM SERPL-SCNC: 137 MMOL/L (ref 136–145)
TSH SERPL DL<=0.05 MIU/L-ACNC: 2.23 UIU/ML (ref 0.27–4.2)
WBC NRBC COR # BLD: 8.55 10*3/MM3 (ref 3.4–10.8)

## 2022-11-04 PROCEDURE — 85025 COMPLETE CBC W/AUTO DIFF WBC: CPT

## 2022-11-04 PROCEDURE — 36415 COLL VENOUS BLD VENIPUNCTURE: CPT

## 2022-11-04 PROCEDURE — 96413 CHEMO IV INFUSION 1 HR: CPT

## 2022-11-04 PROCEDURE — 99214 OFFICE O/P EST MOD 30 MIN: CPT | Performed by: NURSE PRACTITIONER

## 2022-11-04 PROCEDURE — 84443 ASSAY THYROID STIM HORMONE: CPT

## 2022-11-04 PROCEDURE — 25010000002 NIVOLUMAB 240 MG/24ML SOLUTION 24 ML VIAL: Performed by: INTERNAL MEDICINE

## 2022-11-04 PROCEDURE — 80053 COMPREHEN METABOLIC PANEL: CPT

## 2022-11-04 RX ORDER — SODIUM CHLORIDE 9 MG/ML
250 INJECTION, SOLUTION INTRAVENOUS ONCE
Status: DISCONTINUED | OUTPATIENT
Start: 2022-11-04 | End: 2022-11-04 | Stop reason: HOSPADM

## 2022-11-04 RX ADMIN — SODIUM CHLORIDE 480 MG: 9 INJECTION, SOLUTION INTRAVENOUS at 11:33

## 2022-11-04 NOTE — PROGRESS NOTES
PROBLEM LIST:  Oncology/Hematology History   Malignant neoplasm of posterior wall of urinary bladder (HCC)   6/2/2022 Cancer Staged    Staging form: Urinary Bladder, AJCC 8th Edition  - Clinical stage from 6/2/2022: Stage II (cT2, cN0, cM0) - Signed by Kev Cobb MD on 6/29/2022 6/29/2022 Initial Diagnosis    Malignant neoplasm of posterior wall of urinary bladder (HCC)     8/2/2022 Cancer Staged    Staging form: Urinary Bladder, AJCC 8th Edition  - Pathologic stage from 8/2/2022: Stage IIIA (pT3b, pN0, cM0) - Signed by Beverly Valladares MD on 8/25/2022 9/9/2022 -  Chemotherapy    OP BLADDER Nivolumab 480 mg         REASON FOR VISIT: Management of my bladder cancer    HISTORY OF PRESENT ILLNESS:   67 y.o.  male presents today for follow-up of his bladder cancer.  Overall he continues to tolerate nivolumab well.  He denies any itching, rash, shortness of breath.  He does have a viral upper respiratory infection currently.  He said that this is ongoing for about 3 or 4 days and he is starting to feel much better.  He is taking allergy medicine which helps significantly.  Denies any shortness of breath.  No wheezes that he has been able to tell.  He is coughing up and blowing out clear drainage.  Denies nausea or vomiting.  No headaches.    Past medical history, social history and family history was reviewed 11/04/22 and unchanged from prior visit.    Review of Systems:    Review of Systems   Constitutional: Positive for fatigue.   HENT:   Positive for sore throat.    All other systems reviewed and are negative.           Medications:        Current Outpatient Medications:   •  albuterol sulfate  (90 Base) MCG/ACT inhaler, Inhale 2 puffs Every 4 (Four) Hours As Needed for Wheezing., Disp: , Rfl:   •  Anoro Ellipta 62.5-25 MCG/INH aerosol powder  inhaler, Inhale 1 puff Daily., Disp: , Rfl:   •  atorvastatin (LIPITOR) 20 MG tablet, Take 20 mg by mouth Daily., Disp: , Rfl:   •  docusate  sodium (Colace) 100 MG capsule, Take 1 capsule by mouth Daily As Needed for Constipation., Disp: 30 capsule, Rfl: 1  •  famotidine (PEPCID) 40 MG tablet, Take 40 mg by mouth 2 (Two) Times a Day., Disp: , Rfl:   •  fexofenadine (ALLEGRA) 180 MG tablet, Take 180 mg by mouth Daily., Disp: , Rfl:   •  gabapentin (NEURONTIN) 600 MG tablet, Take 600 mg by mouth 3 (Three) Times a Day., Disp: , Rfl:   •  HYDROcodone-acetaminophen (NORCO) 7.5-325 MG per tablet, Take 1 tablet by mouth Every 6 (Six) Hours As Needed for Moderate Pain ., Disp: 30 tablet, Rfl: 0  •  losartan (COZAAR) 50 MG tablet, Take 50 mg by mouth Daily., Disp: , Rfl:   •  montelukast (SINGULAIR) 10 MG tablet, Take 10 mg by mouth Every Night., Disp: , Rfl:   •  multivitamin with minerals tablet tablet, Take 1 tablet by mouth Daily., Disp: , Rfl:   •  nicotine (NICODERM CQ) 14 MG/24HR patch, Place 1 patch on the skin as directed by provider Daily., Disp: , Rfl:   •  ondansetron (ZOFRAN) 8 MG tablet, Take 1 tablet by mouth 3 (Three) Times a Day As Needed for Nausea or Vomiting., Disp: 30 tablet, Rfl: 5  •  polyethylene glycol (MIRALAX) 17 g packet, Take 17 g by mouth Daily., Disp: 30 packet, Rfl: 0  •  tiZANidine (ZANAFLEX) 4 MG tablet, Take 4 mg by mouth Every 8 (Eight) Hours As Needed., Disp: , Rfl:   •  verapamil ER (VERELAN) 240 MG 24 hr capsule, Take 240 mg by mouth Every Night., Disp: , Rfl:   No current facility-administered medications for this visit.    Facility-Administered Medications Ordered in Other Visits:   •  Nivolumab (OPDIVO) 480 mg in sodium chloride 0.9 % 100 mL chemo IVPB, 480 mg, Intravenous, Once, Beverly Valladares MD  •  sodium chloride 0.9 % infusion 250 mL, 250 mL, Intravenous, Once, Beverly Valladares MD    Pain Medications             gabapentin (NEURONTIN) 600 MG tablet Take 600 mg by mouth 3 (Three) Times a Day.    HYDROcodone-acetaminophen (NORCO) 7.5-325 MG per tablet Take 1 tablet by mouth Every 6 (Six) Hours As Needed for  "Moderate Pain .    tiZANidine (ZANAFLEX) 4 MG tablet Take 4 mg by mouth Every 8 (Eight) Hours As Needed.             ALLERGIES:    Allergies   Allergen Reactions   • Fluticasone-Salmeterol Unknown - Low Severity     Thrush   • Spiriva Respimat [Tiotropium Bromide Monohydrate] Unknown - Low Severity     Thrush   • Tetanus Toxoids Swelling   • Food Color Orange [Yellow Dye] Nausea Only   • Naproxen Nausea Only   • Orange Flavor GI Intolerance         Physical Exam    VITAL SIGNS:  /72   Pulse 74   Temp 97.3 °F (36.3 °C) (Temporal)   Resp 20   Ht 182.9 cm (72\")   Wt 116 kg (255 lb)   SpO2 98%   BMI 34.58 kg/m²                 Wt Readings from Last 3 Encounters:   11/04/22 116 kg (255 lb)   10/07/22 113 kg (250 lb)   09/09/22 117 kg (257 lb 14.4 oz)       Body mass index is 34.58 kg/m². Body surface area is 2.37 meters squared.       Performance Status: 1    General: well appearing, in no acute distress  General: well appearing male in no acute distress  Neuro: alert and oriented  HEENT: sclera anicteric, oropharynx clear  Lymphatics: no cervical, supraclavicular, or axillary adenopathy  Cardiovascular: regular rate and rhythm, no murmurs  Lungs: clear to auscultation bilaterally  Abdomen: soft, nontender, nondistended.  No palpable organomegaly  Extremeties: no lower extremity edema  Skin: no rashes, lesions, bruising, or petechiae  Psych: mood and affect appropriate        RECENT LABS:    Lab Results   Component Value Date    HGB 12.0 (L) 11/04/2022    HCT 36.1 (L) 11/04/2022    MCV 79.5 11/04/2022     11/04/2022    WBC 8.55 11/04/2022    NEUTROABS 6.08 11/04/2022    LYMPHSABS 1.54 11/04/2022    MONOSABS 0.53 11/04/2022    EOSABS 0.30 11/04/2022    BASOSABS 0.06 11/04/2022       Lab Results   Component Value Date    GLUCOSE 93 11/04/2022    BUN 14 11/04/2022    CREATININE 1.27 11/04/2022     11/04/2022    K 4.7 11/04/2022     11/04/2022    CO2 23.4 11/04/2022    CALCIUM 9.0 11/04/2022 "    PROTEINTOT 7.1 11/04/2022    ALBUMIN 4.00 11/04/2022    BILITOT 0.2 11/04/2022    ALKPHOS 95 11/04/2022    AST 11 11/04/2022    ALT 8 11/04/2022       NM PET/CT Skull Base to Mid Thigh    Result Date: 9/21/2022   1. Mildly hypermetabolic activity in patchy disease of the right upper and lower lobe, favored to represent mild infection or inflammation. No evidence of underlying mass. 2. Activity that appears to localize to the patient's urostomy, apparently as normal urinary activity. 3. No evidence of hypermetabolic adenopathy, solid organ disease, or other potential metastatic disease.  This report was finalized on 9/21/2022 10:07 PM by Dr. Arnie Martin MD.          Assessment/Plan    1.  Stage IIIa high-grade urothelial carcinoma of the bladder s/p cystectomy.  We will continue with nivolumab with no changes.  He is tolerating it well.  We will plan to repeat scans in early January.  We will order CAT scans at next visit.  For now we will continue with her current regimen and plan going forward.     2.  Rhinitis.  Continue allergy medicine.  I did discuss with the patient if he develops a fever to see his primary care doctor for testing of flu and strep.  Also advised patient to start on Mucinex and will thin secretions.      Total time of patient care including time prior to, face to face with patient, and following visit spent in reviewing records, lab results, imaging studies, discussion with patient, and documentation/charting was > 30minutes      ALLEN Crabtree  [facility]ematology and On[facility]    Return in (Approximately): 1 month    Orders Placed This Encounter   Procedures   • Comprehensive metabolic panel   • CBC and Differential       11/4/2022

## 2022-11-08 ENCOUNTER — TELEPHONE (OUTPATIENT)
Dept: ONCOLOGY | Facility: CLINIC | Age: 67
End: 2022-11-08

## 2022-11-08 NOTE — TELEPHONE ENCOUNTER
Provider: DR GARCIA  Caller: NADEEM  Relationship to Patient:     Reason for Call: NADEEM IS NEEDING PROGRESS NOTES, AND LABS REGARDING INFUSIONS  SHE FAXED RELEASE ON 10-27    PLEASE FAX # [phone]

## 2022-11-11 NOTE — TELEPHONE ENCOUNTER
I do not see it in there and I am not sure.     If the   Calls back she is to call [phone]. This is HIM and how medical records are to be obtained by a 3rd party request.

## 2022-11-28 ENCOUNTER — TELEPHONE (OUTPATIENT)
Dept: NUTRITION | Facility: HOSPITAL | Age: 67
End: 2022-11-28

## 2022-11-28 NOTE — PROGRESS NOTES
RD spoke w/ pt over the phone for nutrition follow-up regarding oncology treatment. Pt reports he has lost 20# (7%) since his surgery in August. But reports he has not lost anymore weight in the last month. Pt does report that he is not able to eat as much as he used to before having surgery. Pt states he gets full faster and at times has stomach pain from eating. RD recommended pt try supplementing w/ nutrition drinks like Boost to make sure he is getting adequate protein and calories in. Pt reports he does not like oral nutrition supplements. Pt denies any nutrition-related questions/concerns at this time. RD available PRN.

## 2022-12-02 ENCOUNTER — INFUSION (OUTPATIENT)
Dept: ONCOLOGY | Facility: HOSPITAL | Age: 67
End: 2022-12-02

## 2022-12-02 ENCOUNTER — OFFICE VISIT (OUTPATIENT)
Dept: ONCOLOGY | Facility: CLINIC | Age: 67
End: 2022-12-02

## 2022-12-02 VITALS
RESPIRATION RATE: 16 BRPM | DIASTOLIC BLOOD PRESSURE: 73 MMHG | BODY MASS INDEX: 34.54 KG/M2 | OXYGEN SATURATION: 96 % | SYSTOLIC BLOOD PRESSURE: 136 MMHG | TEMPERATURE: 97.7 F | HEART RATE: 97 BPM | WEIGHT: 255 LBS | HEIGHT: 72 IN

## 2022-12-02 DIAGNOSIS — C67.4 MALIGNANT NEOPLASM OF POSTERIOR WALL OF URINARY BLADDER: Primary | ICD-10-CM

## 2022-12-02 LAB
ALBUMIN SERPL-MCNC: 4.1 G/DL (ref 3.5–5.2)
ALBUMIN/GLOB SERPL: 1.4 G/DL
ALP SERPL-CCNC: 85 U/L (ref 39–117)
ALT SERPL W P-5'-P-CCNC: 10 U/L (ref 1–41)
ANION GAP SERPL CALCULATED.3IONS-SCNC: 11.9 MMOL/L (ref 5–15)
AST SERPL-CCNC: 12 U/L (ref 1–40)
BASOPHILS # BLD AUTO: 0.09 10*3/MM3 (ref 0–0.2)
BASOPHILS NFR BLD AUTO: 1.1 % (ref 0–1.5)
BILIRUB SERPL-MCNC: 0.2 MG/DL (ref 0–1.2)
BUN SERPL-MCNC: 14 MG/DL (ref 8–23)
BUN/CREAT SERPL: 10.9 (ref 7–25)
CALCIUM SPEC-SCNC: 9.2 MG/DL (ref 8.6–10.5)
CHLORIDE SERPL-SCNC: 101 MMOL/L (ref 98–107)
CO2 SERPL-SCNC: 22.1 MMOL/L (ref 22–29)
CREAT SERPL-MCNC: 1.28 MG/DL (ref 0.76–1.27)
DEPRECATED RDW RBC AUTO: 51 FL (ref 37–54)
EGFRCR SERPLBLD CKD-EPI 2021: 62.1 ML/MIN/1.73
EOSINOPHIL # BLD AUTO: 0.28 10*3/MM3 (ref 0–0.4)
EOSINOPHIL NFR BLD AUTO: 3.5 % (ref 0.3–6.2)
ERYTHROCYTE [DISTWIDTH] IN BLOOD BY AUTOMATED COUNT: 17.6 % (ref 12.3–15.4)
GLOBULIN UR ELPH-MCNC: 2.9 GM/DL
GLUCOSE SERPL-MCNC: 100 MG/DL (ref 65–99)
HCT VFR BLD AUTO: 38.5 % (ref 37.5–51)
HGB BLD-MCNC: 12.7 G/DL (ref 13–17.7)
IMM GRANULOCYTES # BLD AUTO: 0.04 10*3/MM3 (ref 0–0.05)
IMM GRANULOCYTES NFR BLD AUTO: 0.5 % (ref 0–0.5)
LYMPHOCYTES # BLD AUTO: 1.28 10*3/MM3 (ref 0.7–3.1)
LYMPHOCYTES NFR BLD AUTO: 16.1 % (ref 19.6–45.3)
MCH RBC QN AUTO: 26.4 PG (ref 26.6–33)
MCHC RBC AUTO-ENTMCNC: 33 G/DL (ref 31.5–35.7)
MCV RBC AUTO: 80 FL (ref 79–97)
MONOCYTES # BLD AUTO: 0.53 10*3/MM3 (ref 0.1–0.9)
MONOCYTES NFR BLD AUTO: 6.7 % (ref 5–12)
NEUTROPHILS NFR BLD AUTO: 5.71 10*3/MM3 (ref 1.7–7)
NEUTROPHILS NFR BLD AUTO: 72.1 % (ref 42.7–76)
NRBC BLD AUTO-RTO: 0 /100 WBC (ref 0–0.2)
PLATELET # BLD AUTO: 272 10*3/MM3 (ref 140–450)
PMV BLD AUTO: 9.4 FL (ref 6–12)
POTASSIUM SERPL-SCNC: 4.2 MMOL/L (ref 3.5–5.2)
PROT SERPL-MCNC: 7 G/DL (ref 6–8.5)
RBC # BLD AUTO: 4.81 10*6/MM3 (ref 4.14–5.8)
SODIUM SERPL-SCNC: 135 MMOL/L (ref 136–145)
T4 FREE SERPL-MCNC: 1.13 NG/DL (ref 0.93–1.7)
TSH SERPL DL<=0.05 MIU/L-ACNC: 3.23 UIU/ML (ref 0.27–4.2)
WBC NRBC COR # BLD: 7.93 10*3/MM3 (ref 3.4–10.8)

## 2022-12-02 PROCEDURE — 84443 ASSAY THYROID STIM HORMONE: CPT

## 2022-12-02 PROCEDURE — 99214 OFFICE O/P EST MOD 30 MIN: CPT | Performed by: INTERNAL MEDICINE

## 2022-12-02 PROCEDURE — 96413 CHEMO IV INFUSION 1 HR: CPT

## 2022-12-02 PROCEDURE — 36415 COLL VENOUS BLD VENIPUNCTURE: CPT

## 2022-12-02 PROCEDURE — 80053 COMPREHEN METABOLIC PANEL: CPT

## 2022-12-02 PROCEDURE — 84439 ASSAY OF FREE THYROXINE: CPT

## 2022-12-02 PROCEDURE — 25010000002 NIVOLUMAB 240 MG/24ML SOLUTION 24 ML VIAL: Performed by: INTERNAL MEDICINE

## 2022-12-02 PROCEDURE — 85025 COMPLETE CBC W/AUTO DIFF WBC: CPT

## 2022-12-02 RX ORDER — SODIUM CHLORIDE 9 MG/ML
250 INJECTION, SOLUTION INTRAVENOUS ONCE
Status: CANCELLED | OUTPATIENT
Start: 2022-12-02

## 2022-12-02 RX ORDER — SODIUM CHLORIDE 9 MG/ML
250 INJECTION, SOLUTION INTRAVENOUS ONCE
Status: CANCELLED | OUTPATIENT
Start: 2022-12-30

## 2022-12-02 RX ORDER — SODIUM CHLORIDE 9 MG/ML
250 INJECTION, SOLUTION INTRAVENOUS ONCE
Status: CANCELLED | OUTPATIENT
Start: 2023-02-03

## 2022-12-02 RX ORDER — SODIUM CHLORIDE 9 MG/ML
250 INJECTION, SOLUTION INTRAVENOUS ONCE
Status: DISCONTINUED | OUTPATIENT
Start: 2022-12-02 | End: 2022-12-02 | Stop reason: HOSPADM

## 2022-12-02 RX ADMIN — SODIUM CHLORIDE 480 MG: 9 INJECTION, SOLUTION INTRAVENOUS at 11:16

## 2022-12-02 NOTE — PROGRESS NOTES
PROBLEM LIST:  Oncology/Hematology History   Malignant neoplasm of posterior wall of urinary bladder (HCC)   6/2/2022 Cancer Staged    Staging form: Urinary Bladder, AJCC 8th Edition  - Clinical stage from 6/2/2022: Stage II (cT2, cN0, cM0) - Signed by Kev Cobb MD on 6/29/2022 6/29/2022 Initial Diagnosis    Malignant neoplasm of posterior wall of urinary bladder (HCC)     8/2/2022 Cancer Staged    Staging form: Urinary Bladder, AJCC 8th Edition  - Pathologic stage from 8/2/2022: Stage IIIA (pT3b, pN0, cM0) - Signed by Beverly Valladares MD on 8/25/2022 9/9/2022 -  Chemotherapy    OP BLADDER Nivolumab 480 mg         REASON FOR VISIT: Management of my bladder cancer    HISTORY OF PRESENT ILLNESS:   67 y.o.  male presents today for follow-up of his bladder cancer.  Tolerating his Nivolumab adjuvantly reasonably well.  Denies any issues with itching.  He was recently placed on Augmentin for infection.  That gave him couple of days of diarrhea.  That seems to be better.  Otherwise doing well.    Past medical history, social history and family history was reviewed 12/02/22 and unchanged from prior visit.    Review of Systems:    Review of Systems - Oncology         Medications:        Current Outpatient Medications:   •  albuterol sulfate  (90 Base) MCG/ACT inhaler, Inhale 2 puffs Every 4 (Four) Hours As Needed for Wheezing., Disp: , Rfl:   •  Anoro Ellipta 62.5-25 MCG/INH aerosol powder  inhaler, Inhale 1 puff Daily., Disp: , Rfl:   •  atorvastatin (LIPITOR) 20 MG tablet, Take 20 mg by mouth Daily., Disp: , Rfl:   •  docusate sodium (Colace) 100 MG capsule, Take 1 capsule by mouth Daily As Needed for Constipation., Disp: 30 capsule, Rfl: 1  •  famotidine (PEPCID) 40 MG tablet, Take 40 mg by mouth 2 (Two) Times a Day., Disp: , Rfl:   •  fexofenadine (ALLEGRA) 180 MG tablet, Take 180 mg by mouth Daily., Disp: , Rfl:   •  gabapentin (NEURONTIN) 600 MG tablet, Take 600 mg by mouth 3  (Three) Times a Day., Disp: , Rfl:   •  HYDROcodone-acetaminophen (NORCO) 7.5-325 MG per tablet, Take 1 tablet by mouth Every 6 (Six) Hours As Needed for Moderate Pain ., Disp: 30 tablet, Rfl: 0  •  losartan (COZAAR) 50 MG tablet, Take 50 mg by mouth Daily., Disp: , Rfl:   •  montelukast (SINGULAIR) 10 MG tablet, Take 10 mg by mouth Every Night., Disp: , Rfl:   •  multivitamin with minerals tablet tablet, Take 1 tablet by mouth Daily., Disp: , Rfl:   •  nicotine (NICODERM CQ) 14 MG/24HR patch, Place 1 patch on the skin as directed by provider Daily., Disp: , Rfl:   •  ondansetron (ZOFRAN) 8 MG tablet, Take 1 tablet by mouth 3 (Three) Times a Day As Needed for Nausea or Vomiting., Disp: 30 tablet, Rfl: 5  •  polyethylene glycol (MIRALAX) 17 g packet, Take 17 g by mouth Daily., Disp: 30 packet, Rfl: 0  •  tiZANidine (ZANAFLEX) 4 MG tablet, Take 4 mg by mouth Every 8 (Eight) Hours As Needed., Disp: , Rfl:   •  verapamil ER (VERELAN) 240 MG 24 hr capsule, Take 240 mg by mouth Every Night., Disp: , Rfl:     Pain Medications             gabapentin (NEURONTIN) 600 MG tablet Take 600 mg by mouth 3 (Three) Times a Day.    HYDROcodone-acetaminophen (NORCO) 7.5-325 MG per tablet Take 1 tablet by mouth Every 6 (Six) Hours As Needed for Moderate Pain .    tiZANidine (ZANAFLEX) 4 MG tablet Take 4 mg by mouth Every 8 (Eight) Hours As Needed.             ALLERGIES:    Allergies   Allergen Reactions   • Fluticasone-Salmeterol Unknown - Low Severity     Thrush   • Spiriva Respimat [Tiotropium Bromide Monohydrate] Unknown - Low Severity     Thrush   • Tetanus Toxoids Swelling   • Food Color Orange [Yellow Dye] Nausea Only   • Naproxen Nausea Only   • Orange Flavor GI Intolerance         Physical Exam    VITAL SIGNS:  There were no vitals taken for this visit.                Wt Readings from Last 3 Encounters:   11/04/22 116 kg (255 lb)   10/07/22 113 kg (250 lb)   09/09/22 117 kg (257 lb 14.4 oz)       There is no height or weight on  file to calculate BMI. There is no height or weight on file to calculate BSA.       Performance Status: 0    General: well appearing, in no acute distress  HEENT: sclera anicteric, neck is supple  Lymphatics: no cervical, supraclavicular, or axillary adenopathy  Cardiovascular: regular rate and rhythm, no murmurs, rubs or gallops  Lungs: clear to auscultation bilaterally  Abdomen: soft, nontender, nondistended.  No palpable organomegaly  Extremities: no lower extremity edema  Skin: no rashes, lesions, bruising, or petechiae  Msk:  Shows no weakness of the large muscle groups  Psych: Mood is stable        RECENT LABS:    Lab Results   Component Value Date    HGB 12.0 (L) 11/04/2022    HCT 36.1 (L) 11/04/2022    MCV 79.5 11/04/2022     11/04/2022    WBC 8.55 11/04/2022    NEUTROABS 6.08 11/04/2022    LYMPHSABS 1.54 11/04/2022    MONOSABS 0.53 11/04/2022    EOSABS 0.30 11/04/2022    BASOSABS 0.06 11/04/2022       Lab Results   Component Value Date    GLUCOSE 93 11/04/2022    BUN 14 11/04/2022    CREATININE 1.27 11/04/2022     11/04/2022    K 4.7 11/04/2022     11/04/2022    CO2 23.4 11/04/2022    CALCIUM 9.0 11/04/2022    PROTEINTOT 7.1 11/04/2022    ALBUMIN 4.00 11/04/2022    BILITOT 0.2 11/04/2022    ALKPHOS 95 11/04/2022    AST 11 11/04/2022    ALT 8 11/04/2022       NM PET/CT Skull Base to Mid Thigh    Result Date: 9/21/2022   1. Mildly hypermetabolic activity in patchy disease of the right upper and lower lobe, favored to represent mild infection or inflammation. No evidence of underlying mass. 2. Activity that appears to localize to the patient's urostomy, apparently as normal urinary activity. 3. No evidence of hypermetabolic adenopathy, solid organ disease, or other potential metastatic disease.  This report was finalized on 9/21/2022 10:07 PM by Dr. Arnie Martin MD.          Assessment/Plan    1.  Stage IIIa high-grade urothelial carcinoma of the bladder s/p cystectomy.  Proceed with nivolumab  maintenance for now.  Plan to treat him for 1 year.  Repeat scans end of January 2023 and see me in early February.  No changes to his dose for now.  High risk of recurrent disease.    Total time of patient care on day of service including time prior to, face to face with patient, and following visit spent in reviewing records, lab results, imaging studies, discussion with patient, and documentation/charting was > 31 minutes.     Beverly Valladares MD  [facility]         No orders of the defined types were placed in this encounter.      12/2/2022

## 2022-12-13 ENCOUNTER — TELEPHONE (OUTPATIENT)
Dept: ONCOLOGY | Facility: CLINIC | Age: 67
End: 2022-12-13

## 2022-12-13 NOTE — TELEPHONE ENCOUNTER
Caller: JOSE F FROM [employer]    Best call back number: [phone]    What is the best time to reach you: ANYTIME     Who are you requesting to speak with (clinical staff, provider,  specific staff member): CLINICAL     Do you know the name of the person who called: JOSE F    What was the call regarding: JOSE F CALLING FROM [facility] [employer] 1/1/2023 AND NEEDS A PROCEDURE CODE, WANTING TO PUT IN A REFERRAL EFF 1/1/2023 SO HE DOESN'T HAVE ANY ISSUE .    CALL TO DISCUSS    Do you require a callback: YES

## 2023-01-06 ENCOUNTER — INFUSION (OUTPATIENT)
Dept: ONCOLOGY | Facility: HOSPITAL | Age: 68
End: 2023-01-06
Payer: COMMERCIAL

## 2023-01-06 VITALS
OXYGEN SATURATION: 98 % | SYSTOLIC BLOOD PRESSURE: 157 MMHG | HEART RATE: 72 BPM | WEIGHT: 262.3 LBS | TEMPERATURE: 98.7 F | RESPIRATION RATE: 18 BRPM | DIASTOLIC BLOOD PRESSURE: 74 MMHG | BODY MASS INDEX: 35.57 KG/M2

## 2023-01-06 DIAGNOSIS — C67.4 MALIGNANT NEOPLASM OF POSTERIOR WALL OF URINARY BLADDER: Primary | ICD-10-CM

## 2023-01-06 LAB
ALBUMIN SERPL-MCNC: 4 G/DL (ref 3.5–5.2)
ALBUMIN/GLOB SERPL: 1.4 G/DL
ALP SERPL-CCNC: 83 U/L (ref 39–117)
ALT SERPL W P-5'-P-CCNC: 10 U/L (ref 1–41)
ANION GAP SERPL CALCULATED.3IONS-SCNC: 13.3 MMOL/L (ref 5–15)
AST SERPL-CCNC: 15 U/L (ref 1–40)
BASOPHILS # BLD AUTO: 0.09 10*3/MM3 (ref 0–0.2)
BASOPHILS NFR BLD AUTO: 1.1 % (ref 0–1.5)
BILIRUB SERPL-MCNC: 0.2 MG/DL (ref 0–1.2)
BUN SERPL-MCNC: 14 MG/DL (ref 8–23)
BUN/CREAT SERPL: 9.9 (ref 7–25)
CALCIUM SPEC-SCNC: 9.5 MG/DL (ref 8.6–10.5)
CHLORIDE SERPL-SCNC: 103 MMOL/L (ref 98–107)
CO2 SERPL-SCNC: 20.7 MMOL/L (ref 22–29)
CREAT SERPL-MCNC: 1.41 MG/DL (ref 0.76–1.27)
DEPRECATED RDW RBC AUTO: 53 FL (ref 37–54)
EGFRCR SERPLBLD CKD-EPI 2021: 55.3 ML/MIN/1.73
EOSINOPHIL # BLD AUTO: 0.36 10*3/MM3 (ref 0–0.4)
EOSINOPHIL NFR BLD AUTO: 4.5 % (ref 0.3–6.2)
ERYTHROCYTE [DISTWIDTH] IN BLOOD BY AUTOMATED COUNT: 17.7 % (ref 12.3–15.4)
GLOBULIN UR ELPH-MCNC: 2.8 GM/DL
GLUCOSE SERPL-MCNC: 97 MG/DL (ref 65–99)
HCT VFR BLD AUTO: 38.5 % (ref 37.5–51)
HGB BLD-MCNC: 12.9 G/DL (ref 13–17.7)
IMM GRANULOCYTES # BLD AUTO: 0.04 10*3/MM3 (ref 0–0.05)
IMM GRANULOCYTES NFR BLD AUTO: 0.5 % (ref 0–0.5)
LYMPHOCYTES # BLD AUTO: 1.41 10*3/MM3 (ref 0.7–3.1)
LYMPHOCYTES NFR BLD AUTO: 17.5 % (ref 19.6–45.3)
MCH RBC QN AUTO: 27.6 PG (ref 26.6–33)
MCHC RBC AUTO-ENTMCNC: 33.5 G/DL (ref 31.5–35.7)
MCV RBC AUTO: 82.4 FL (ref 79–97)
MONOCYTES # BLD AUTO: 0.65 10*3/MM3 (ref 0.1–0.9)
MONOCYTES NFR BLD AUTO: 8 % (ref 5–12)
NEUTROPHILS NFR BLD AUTO: 5.53 10*3/MM3 (ref 1.7–7)
NEUTROPHILS NFR BLD AUTO: 68.4 % (ref 42.7–76)
NRBC BLD AUTO-RTO: 0 /100 WBC (ref 0–0.2)
PLATELET # BLD AUTO: 273 10*3/MM3 (ref 140–450)
PMV BLD AUTO: 9.3 FL (ref 6–12)
POTASSIUM SERPL-SCNC: 4.6 MMOL/L (ref 3.5–5.2)
PROT SERPL-MCNC: 6.8 G/DL (ref 6–8.5)
RBC # BLD AUTO: 4.67 10*6/MM3 (ref 4.14–5.8)
SODIUM SERPL-SCNC: 137 MMOL/L (ref 136–145)
T4 FREE SERPL-MCNC: 1.11 NG/DL (ref 0.93–1.7)
TSH SERPL DL<=0.05 MIU/L-ACNC: 1.72 UIU/ML (ref 0.27–4.2)
WBC NRBC COR # BLD: 8.08 10*3/MM3 (ref 3.4–10.8)

## 2023-01-06 PROCEDURE — 96413 CHEMO IV INFUSION 1 HR: CPT

## 2023-01-06 PROCEDURE — 36415 COLL VENOUS BLD VENIPUNCTURE: CPT

## 2023-01-06 PROCEDURE — 85025 COMPLETE CBC W/AUTO DIFF WBC: CPT

## 2023-01-06 PROCEDURE — 84443 ASSAY THYROID STIM HORMONE: CPT

## 2023-01-06 PROCEDURE — 25010000002 NIVOLUMAB 240 MG/24ML SOLUTION 24 ML VIAL: Performed by: INTERNAL MEDICINE

## 2023-01-06 PROCEDURE — 80053 COMPREHEN METABOLIC PANEL: CPT

## 2023-01-06 PROCEDURE — 84439 ASSAY OF FREE THYROXINE: CPT

## 2023-01-06 RX ORDER — SODIUM CHLORIDE 9 MG/ML
250 INJECTION, SOLUTION INTRAVENOUS ONCE
Status: DISCONTINUED | OUTPATIENT
Start: 2023-01-06 | End: 2023-01-06 | Stop reason: HOSPADM

## 2023-01-06 RX ADMIN — SODIUM CHLORIDE 480 MG: 9 INJECTION, SOLUTION INTRAVENOUS at 12:10

## 2023-01-31 ENCOUNTER — HOSPITAL ENCOUNTER (OUTPATIENT)
Dept: CT IMAGING | Facility: HOSPITAL | Age: 68
Discharge: HOME | End: 2023-01-31
Payer: COMMERCIAL

## 2023-01-31 DIAGNOSIS — C67.4 MALIGNANT NEOPLASM OF POSTERIOR WALL OF URINARY BLADDER: ICD-10-CM

## 2023-01-31 PROCEDURE — 74177 CT ABD & PELVIS W/CONTRAST: CPT

## 2023-01-31 PROCEDURE — 71260 CT THORAX DX C+: CPT

## 2023-01-31 PROCEDURE — 25010000002 IOPAMIDOL 61 % SOLUTION: Performed by: INTERNAL MEDICINE

## 2023-01-31 RX ADMIN — IOPAMIDOL 100 ML: 612 INJECTION, SOLUTION INTRAVENOUS at 10:39

## 2023-02-03 ENCOUNTER — INFUSION (OUTPATIENT)
Dept: ONCOLOGY | Facility: HOSPITAL | Age: 68
End: 2023-02-03
Payer: MEDICARE

## 2023-02-03 ENCOUNTER — OFFICE VISIT (OUTPATIENT)
Dept: ONCOLOGY | Facility: CLINIC | Age: 68
End: 2023-02-03
Payer: MEDICARE

## 2023-02-03 VITALS
HEIGHT: 72 IN | OXYGEN SATURATION: 98 % | WEIGHT: 268.1 LBS | DIASTOLIC BLOOD PRESSURE: 81 MMHG | HEART RATE: 86 BPM | SYSTOLIC BLOOD PRESSURE: 139 MMHG | BODY MASS INDEX: 36.31 KG/M2 | TEMPERATURE: 97.1 F

## 2023-02-03 DIAGNOSIS — C67.4 MALIGNANT NEOPLASM OF POSTERIOR WALL OF URINARY BLADDER: Primary | ICD-10-CM

## 2023-02-03 LAB
ALBUMIN SERPL-MCNC: 4 G/DL (ref 3.5–5.2)
ALBUMIN/GLOB SERPL: 1.4 G/DL
ALP SERPL-CCNC: 85 U/L (ref 39–117)
ALT SERPL W P-5'-P-CCNC: 12 U/L (ref 1–41)
ANION GAP SERPL CALCULATED.3IONS-SCNC: 9.1 MMOL/L (ref 5–15)
AST SERPL-CCNC: 14 U/L (ref 1–40)
BASOPHILS # BLD AUTO: 0.1 10*3/MM3 (ref 0–0.2)
BASOPHILS NFR BLD AUTO: 1.2 % (ref 0–1.5)
BILIRUB SERPL-MCNC: 0.3 MG/DL (ref 0–1.2)
BUN SERPL-MCNC: 15 MG/DL (ref 8–23)
BUN/CREAT SERPL: 11.7 (ref 7–25)
CALCIUM SPEC-SCNC: 9.5 MG/DL (ref 8.6–10.5)
CHLORIDE SERPL-SCNC: 105 MMOL/L (ref 98–107)
CO2 SERPL-SCNC: 22.9 MMOL/L (ref 22–29)
CREAT SERPL-MCNC: 1.28 MG/DL (ref 0.76–1.27)
DEPRECATED RDW RBC AUTO: 49.8 FL (ref 37–54)
EGFRCR SERPLBLD CKD-EPI 2021: 62.1 ML/MIN/1.73
EOSINOPHIL # BLD AUTO: 0.23 10*3/MM3 (ref 0–0.4)
EOSINOPHIL NFR BLD AUTO: 2.8 % (ref 0.3–6.2)
ERYTHROCYTE [DISTWIDTH] IN BLOOD BY AUTOMATED COUNT: 16.1 % (ref 12.3–15.4)
GLOBULIN UR ELPH-MCNC: 2.8 GM/DL
GLUCOSE SERPL-MCNC: 93 MG/DL (ref 65–99)
HCT VFR BLD AUTO: 38.8 % (ref 37.5–51)
HGB BLD-MCNC: 12.8 G/DL (ref 13–17.7)
IMM GRANULOCYTES # BLD AUTO: 0.03 10*3/MM3 (ref 0–0.05)
IMM GRANULOCYTES NFR BLD AUTO: 0.4 % (ref 0–0.5)
LYMPHOCYTES # BLD AUTO: 1.43 10*3/MM3 (ref 0.7–3.1)
LYMPHOCYTES NFR BLD AUTO: 17.6 % (ref 19.6–45.3)
MCH RBC QN AUTO: 28 PG (ref 26.6–33)
MCHC RBC AUTO-ENTMCNC: 33 G/DL (ref 31.5–35.7)
MCV RBC AUTO: 84.9 FL (ref 79–97)
MONOCYTES # BLD AUTO: 0.56 10*3/MM3 (ref 0.1–0.9)
MONOCYTES NFR BLD AUTO: 6.9 % (ref 5–12)
NEUTROPHILS NFR BLD AUTO: 5.77 10*3/MM3 (ref 1.7–7)
NEUTROPHILS NFR BLD AUTO: 71.1 % (ref 42.7–76)
NRBC BLD AUTO-RTO: 0 /100 WBC (ref 0–0.2)
PLATELET # BLD AUTO: 254 10*3/MM3 (ref 140–450)
PMV BLD AUTO: 9.2 FL (ref 6–12)
POTASSIUM SERPL-SCNC: 4.6 MMOL/L (ref 3.5–5.2)
PROT SERPL-MCNC: 6.8 G/DL (ref 6–8.5)
RBC # BLD AUTO: 4.57 10*6/MM3 (ref 4.14–5.8)
SODIUM SERPL-SCNC: 137 MMOL/L (ref 136–145)
T4 FREE SERPL-MCNC: 0.9 NG/DL (ref 0.93–1.7)
TSH SERPL DL<=0.05 MIU/L-ACNC: 2.93 UIU/ML (ref 0.27–4.2)
WBC NRBC COR # BLD: 8.12 10*3/MM3 (ref 3.4–10.8)

## 2023-02-03 PROCEDURE — 96413 CHEMO IV INFUSION 1 HR: CPT

## 2023-02-03 PROCEDURE — 84439 ASSAY OF FREE THYROXINE: CPT

## 2023-02-03 PROCEDURE — 99215 OFFICE O/P EST HI 40 MIN: CPT | Performed by: NURSE PRACTITIONER

## 2023-02-03 PROCEDURE — 25010000002 NIVOLUMAB 240 MG/24ML SOLUTION 24 ML VIAL: Performed by: INTERNAL MEDICINE

## 2023-02-03 PROCEDURE — 84443 ASSAY THYROID STIM HORMONE: CPT

## 2023-02-03 PROCEDURE — 36415 COLL VENOUS BLD VENIPUNCTURE: CPT

## 2023-02-03 PROCEDURE — 80053 COMPREHEN METABOLIC PANEL: CPT

## 2023-02-03 PROCEDURE — 85025 COMPLETE CBC W/AUTO DIFF WBC: CPT

## 2023-02-03 RX ORDER — SODIUM CHLORIDE 9 MG/ML
250 INJECTION, SOLUTION INTRAVENOUS ONCE
Status: DISCONTINUED | OUTPATIENT
Start: 2023-02-03 | End: 2023-02-03 | Stop reason: HOSPADM

## 2023-02-03 RX ADMIN — SODIUM CHLORIDE 480 MG: 9 INJECTION, SOLUTION INTRAVENOUS at 12:20

## 2023-02-03 NOTE — PROGRESS NOTES
PROBLEM LIST:  Oncology/Hematology History   Malignant neoplasm of posterior wall of urinary bladder (HCC)   6/2/2022 Cancer Staged    Staging form: Urinary Bladder, AJCC 8th Edition  - Clinical stage from 6/2/2022: Stage II (cT2, cN0, cM0) - Signed by Kev Cobb MD on 6/29/2022 6/29/2022 Initial Diagnosis    Malignant neoplasm of posterior wall of urinary bladder (HCC)     8/2/2022 Cancer Staged    Staging form: Urinary Bladder, AJCC 8th Edition  - Pathologic stage from 8/2/2022: Stage IIIA (pT3b, pN0, cM0) - Signed by Beverly Valladares MD on 8/25/2022 9/9/2022 -  Chemotherapy    OP BLADDER Nivolumab 480 mg         REASON FOR VISIT: Management of my bladder cancer    HISTORY OF PRESENT ILLNESS:   67 y.o.  male presents today for follow-up of his bladder cancer.  Overall, he is doing fairly well.  He continues tolerating nivolumab well.  He is anxious about scans.  Denies any issues with infection.  Overall he is doing fairly well.  He does need knee surgery.  He is having right knee pain and has been diagnosed with a torn meniscus.  He needs to have his CT scan sent to his family doctor once it is addended today    Past medical history, social history and family history was reviewed 02/06/23 and unchanged from prior visit.    Review of Systems:    Review of Systems   Constitutional: Positive for fatigue.   All other systems reviewed and are negative.           Medications:        Current Outpatient Medications:   •  albuterol sulfate  (90 Base) MCG/ACT inhaler, Inhale 2 puffs Every 4 (Four) Hours As Needed for Wheezing., Disp: , Rfl:   •  Anoro Ellipta 62.5-25 MCG/INH aerosol powder  inhaler, Inhale 1 puff Daily., Disp: , Rfl:   •  atorvastatin (LIPITOR) 20 MG tablet, Take 20 mg by mouth Daily., Disp: , Rfl:   •  docusate sodium (Colace) 100 MG capsule, Take 1 capsule by mouth Daily As Needed for Constipation., Disp: 30 capsule, Rfl: 1  •  famotidine (PEPCID) 40 MG tablet, Take 40  mg by mouth 3 (Three) Times a Day., Disp: , Rfl:   •  fexofenadine (ALLEGRA) 180 MG tablet, Take 180 mg by mouth Daily., Disp: , Rfl:   •  gabapentin (NEURONTIN) 600 MG tablet, Take 600 mg by mouth 3 (Three) Times a Day., Disp: , Rfl:   •  HYDROcodone-acetaminophen (NORCO) 7.5-325 MG per tablet, Take 1 tablet by mouth Every 6 (Six) Hours As Needed for Moderate Pain . (Patient taking differently: Take 1 tablet by mouth Every 6 (Six) Hours As Needed for Moderate Pain. Increased  to  mg TID), Disp: 30 tablet, Rfl: 0  •  losartan (COZAAR) 50 MG tablet, Take 50 mg by mouth Daily., Disp: , Rfl:   •  montelukast (SINGULAIR) 10 MG tablet, Take 10 mg by mouth Every Night., Disp: , Rfl:   •  multivitamin with minerals tablet tablet, Take 1 tablet by mouth Daily., Disp: , Rfl:   •  nicotine (NICODERM CQ) 14 MG/24HR patch, Place 1 patch on the skin as directed by provider Daily., Disp: , Rfl:   •  ondansetron (ZOFRAN) 8 MG tablet, Take 1 tablet by mouth 3 (Three) Times a Day As Needed for Nausea or Vomiting., Disp: 30 tablet, Rfl: 5  •  polyethylene glycol (MIRALAX) 17 g packet, Take 17 g by mouth Daily., Disp: 30 packet, Rfl: 0  •  tiZANidine (ZANAFLEX) 4 MG tablet, Take 4 mg by mouth Every 8 (Eight) Hours As Needed., Disp: , Rfl:   •  verapamil ER (VERELAN) 240 MG 24 hr capsule, Take 240 mg by mouth Every Night., Disp: , Rfl:     Pain Medications             gabapentin (NEURONTIN) 600 MG tablet Take 600 mg by mouth 3 (Three) Times a Day.    HYDROcodone-acetaminophen (NORCO) 7.5-325 MG per tablet Take 1 tablet by mouth Every 6 (Six) Hours As Needed for Moderate Pain .    tiZANidine (ZANAFLEX) 4 MG tablet Take 4 mg by mouth Every 8 (Eight) Hours As Needed.             ALLERGIES:    Allergies   Allergen Reactions   • Fluticasone-Salmeterol Unknown - Low Severity     Thrush   • Spiriva Respimat [Tiotropium Bromide Monohydrate] Unknown - Low Severity     Thrush   • Tetanus Toxoids Swelling   • Food Color Orange [Yellow Dye]  "Nausea Only   • Naproxen Nausea Only   • Orange Flavor GI Intolerance         Physical Exam    VITAL SIGNS:  /81   Pulse 86   Temp 97.1 °F (36.2 °C)   Ht 182.9 cm (72\")   Wt 122 kg (268 lb 1.6 oz)   SpO2 98%   BMI 36.36 kg/m²                 Wt Readings from Last 3 Encounters:   02/03/23 122 kg (268 lb 1.6 oz)   01/06/23 119 kg (262 lb 4.8 oz)   12/02/22 116 kg (255 lb)       Body mass index is 36.36 kg/m². Body surface area is 2.42 meters squared.       Performance Status: 1    General: well appearing male in no acute distress  Neuro: alert and oriented  HEENT: sclera anicteric, oropharynx clear  Lymphatics: no cervical, supraclavicular, or axillary adenopathy  Cardiovascular: regular rate and rhythm, no murmurs  Lungs: clear to auscultation bilaterally  Abdomen: soft, nontender, nondistended.  No palpable organomegaly  Extremeties: no lower extremity edema  Skin: no rashes, lesions, bruising, or petechiae  Psych: mood and affect appropriate        RECENT LABS:    Lab Results   Component Value Date    HGB 12.8 (L) 02/03/2023    HCT 38.8 02/03/2023    MCV 84.9 02/03/2023     02/03/2023    WBC 8.12 02/03/2023    NEUTROABS 5.77 02/03/2023    LYMPHSABS 1.43 02/03/2023    MONOSABS 0.56 02/03/2023    EOSABS 0.23 02/03/2023    BASOSABS 0.10 02/03/2023       Lab Results   Component Value Date    GLUCOSE 93 02/03/2023    BUN 15 02/03/2023    CREATININE 1.28 (H) 02/03/2023     02/03/2023    K 4.6 02/03/2023     02/03/2023    CO2 22.9 02/03/2023    CALCIUM 9.5 02/03/2023    PROTEINTOT 6.8 02/03/2023    ALBUMIN 4.0 02/03/2023    BILITOT 0.3 02/03/2023    ALKPHOS 85 02/03/2023    AST 14 02/03/2023    ALT 12 02/03/2023       NM PET/CT Skull Base to Mid Thigh    Result Date: 9/21/2022   1. Mildly hypermetabolic activity in patchy disease of the right upper and lower lobe, favored to represent mild infection or inflammation. No evidence of underlying mass. 2. Activity that appears to localize to the " patient's urostomy, apparently as normal urinary activity. 3. No evidence of hypermetabolic adenopathy, solid organ disease, or other potential metastatic disease.  This report was finalized on 9/21/2022 10:07 PM by Dr. Arnie Martin MD.          Assessment/Plan    1.  Stage IIIa high-grade urothelial carcinoma of the bladder s/p cystectomy.  We discussed scans showed no significant changes from previous PET scan.  For now, we we will proceed with nivolumab maintenance.  We will continue with the current plan to treat him for 1 year.  We will plan to repeat scans end of April 2023.  He does have a high risk of recurrent disease so we will continue with current dosing.    Total time of patient care including time prior to, face to face with patient, and following visit spent in reviewing records, lab results, imaging studies, discussion with patient, and documentation/charting was > 40 minutes      ALLEN Crabtree  [facility] Hematology and Oncology    Return in (Approximately): 1 month    Orders Placed This Encounter   Procedures   • Comprehensive metabolic panel   • TSH   • T4, free   • CBC and Differential       2/6/2023

## 2023-03-03 ENCOUNTER — INFUSION (OUTPATIENT)
Dept: ONCOLOGY | Facility: HOSPITAL | Age: 68
End: 2023-03-03
Payer: MEDICARE

## 2023-03-03 ENCOUNTER — OFFICE VISIT (OUTPATIENT)
Dept: ONCOLOGY | Facility: CLINIC | Age: 68
End: 2023-03-03
Payer: MEDICARE

## 2023-03-03 VITALS
RESPIRATION RATE: 24 BRPM | WEIGHT: 280 LBS | HEART RATE: 80 BPM | DIASTOLIC BLOOD PRESSURE: 65 MMHG | HEIGHT: 72 IN | SYSTOLIC BLOOD PRESSURE: 141 MMHG | BODY MASS INDEX: 37.93 KG/M2 | TEMPERATURE: 97.1 F | OXYGEN SATURATION: 99 %

## 2023-03-03 DIAGNOSIS — I89.0 LYMPHEDEMA OF RIGHT LOWER EXTREMITY: ICD-10-CM

## 2023-03-03 DIAGNOSIS — C67.4 MALIGNANT NEOPLASM OF POSTERIOR WALL OF URINARY BLADDER: Primary | ICD-10-CM

## 2023-03-03 LAB
ALBUMIN SERPL-MCNC: 3.7 G/DL (ref 3.5–5.2)
ALBUMIN/GLOB SERPL: 1.2 G/DL
ALP SERPL-CCNC: 102 U/L (ref 39–117)
ALT SERPL W P-5'-P-CCNC: 32 U/L (ref 1–41)
ANION GAP SERPL CALCULATED.3IONS-SCNC: 10.1 MMOL/L (ref 5–15)
AST SERPL-CCNC: 28 U/L (ref 1–40)
BASOPHILS # BLD AUTO: 0.09 10*3/MM3 (ref 0–0.2)
BASOPHILS NFR BLD AUTO: 0.9 % (ref 0–1.5)
BILIRUB SERPL-MCNC: 0.5 MG/DL (ref 0–1.2)
BUN SERPL-MCNC: 12 MG/DL (ref 8–23)
BUN/CREAT SERPL: 8.8 (ref 7–25)
CALCIUM SPEC-SCNC: 9.3 MG/DL (ref 8.6–10.5)
CHLORIDE SERPL-SCNC: 104 MMOL/L (ref 98–107)
CO2 SERPL-SCNC: 21.9 MMOL/L (ref 22–29)
CREAT SERPL-MCNC: 1.36 MG/DL (ref 0.76–1.27)
DEPRECATED RDW RBC AUTO: 46.6 FL (ref 37–54)
EGFRCR SERPLBLD CKD-EPI 2021: 57.8 ML/MIN/1.73
EOSINOPHIL # BLD AUTO: 0.34 10*3/MM3 (ref 0–0.4)
EOSINOPHIL NFR BLD AUTO: 3.5 % (ref 0.3–6.2)
ERYTHROCYTE [DISTWIDTH] IN BLOOD BY AUTOMATED COUNT: 15.4 % (ref 12.3–15.4)
GLOBULIN UR ELPH-MCNC: 3.1 GM/DL
GLUCOSE SERPL-MCNC: 107 MG/DL (ref 65–99)
HCT VFR BLD AUTO: 34.4 % (ref 37.5–51)
HGB BLD-MCNC: 11.6 G/DL (ref 13–17.7)
IMM GRANULOCYTES # BLD AUTO: 0.08 10*3/MM3 (ref 0–0.05)
IMM GRANULOCYTES NFR BLD AUTO: 0.8 % (ref 0–0.5)
LYMPHOCYTES # BLD AUTO: 0.91 10*3/MM3 (ref 0.7–3.1)
LYMPHOCYTES NFR BLD AUTO: 9.5 % (ref 19.6–45.3)
MCH RBC QN AUTO: 28.2 PG (ref 26.6–33)
MCHC RBC AUTO-ENTMCNC: 33.7 G/DL (ref 31.5–35.7)
MCV RBC AUTO: 83.5 FL (ref 79–97)
MONOCYTES # BLD AUTO: 0.79 10*3/MM3 (ref 0.1–0.9)
MONOCYTES NFR BLD AUTO: 8.2 % (ref 5–12)
NEUTROPHILS NFR BLD AUTO: 7.41 10*3/MM3 (ref 1.7–7)
NEUTROPHILS NFR BLD AUTO: 77.1 % (ref 42.7–76)
NRBC BLD AUTO-RTO: 0 /100 WBC (ref 0–0.2)
PLATELET # BLD AUTO: 253 10*3/MM3 (ref 140–450)
PMV BLD AUTO: 8.9 FL (ref 6–12)
POTASSIUM SERPL-SCNC: 4.4 MMOL/L (ref 3.5–5.2)
PROT SERPL-MCNC: 6.8 G/DL (ref 6–8.5)
RBC # BLD AUTO: 4.12 10*6/MM3 (ref 4.14–5.8)
SODIUM SERPL-SCNC: 136 MMOL/L (ref 136–145)
T4 FREE SERPL-MCNC: 1.06 NG/DL (ref 0.93–1.7)
TSH SERPL DL<=0.05 MIU/L-ACNC: 2.05 UIU/ML (ref 0.27–4.2)
WBC NRBC COR # BLD: 9.62 10*3/MM3 (ref 3.4–10.8)

## 2023-03-03 PROCEDURE — 36415 COLL VENOUS BLD VENIPUNCTURE: CPT

## 2023-03-03 PROCEDURE — 84439 ASSAY OF FREE THYROXINE: CPT

## 2023-03-03 PROCEDURE — 80053 COMPREHEN METABOLIC PANEL: CPT

## 2023-03-03 PROCEDURE — 85025 COMPLETE CBC W/AUTO DIFF WBC: CPT

## 2023-03-03 PROCEDURE — 84443 ASSAY THYROID STIM HORMONE: CPT

## 2023-03-03 PROCEDURE — 99214 OFFICE O/P EST MOD 30 MIN: CPT | Performed by: INTERNAL MEDICINE

## 2023-03-03 PROCEDURE — 25010000002 NIVOLUMAB 240 MG/24ML SOLUTION 24 ML VIAL: Performed by: INTERNAL MEDICINE

## 2023-03-03 PROCEDURE — 96413 CHEMO IV INFUSION 1 HR: CPT

## 2023-03-03 RX ORDER — SODIUM CHLORIDE 9 MG/ML
250 INJECTION, SOLUTION INTRAVENOUS ONCE
Status: CANCELLED | OUTPATIENT
Start: 2023-03-03

## 2023-03-03 RX ORDER — SODIUM CHLORIDE 9 MG/ML
250 INJECTION, SOLUTION INTRAVENOUS ONCE
Status: CANCELLED | OUTPATIENT
Start: 2023-03-31

## 2023-03-03 RX ORDER — CEPHALEXIN 500 MG/1
CAPSULE ORAL
COMMUNITY
Start: 2023-02-23

## 2023-03-03 RX ORDER — SODIUM CHLORIDE 9 MG/ML
250 INJECTION, SOLUTION INTRAVENOUS ONCE
OUTPATIENT
Start: 2023-04-28

## 2023-03-03 RX ORDER — SODIUM CHLORIDE 9 MG/ML
250 INJECTION, SOLUTION INTRAVENOUS ONCE
Status: DISCONTINUED | OUTPATIENT
Start: 2023-03-03 | End: 2023-03-03 | Stop reason: HOSPADM

## 2023-03-03 RX ADMIN — SODIUM CHLORIDE 480 MG: 9 INJECTION, SOLUTION INTRAVENOUS at 12:18

## 2023-03-03 NOTE — PROGRESS NOTES
PROBLEM LIST:  Oncology/Hematology History   Malignant neoplasm of posterior wall of urinary bladder (HCC)   6/2/2022 Cancer Staged    Staging form: Urinary Bladder, AJCC 8th Edition  - Clinical stage from 6/2/2022: Stage II (cT2, cN0, cM0) - Signed by Kev Cobb MD on 6/29/2022 6/29/2022 Initial Diagnosis    Malignant neoplasm of posterior wall of urinary bladder (HCC)     8/2/2022 Cancer Staged    Staging form: Urinary Bladder, AJCC 8th Edition  - Pathologic stage from 8/2/2022: Stage IIIA (pT3b, pN0, cM0) - Signed by Beverly Valladares MD on 8/25/2022 9/9/2022 -  Chemotherapy    OP BLADDER Nivolumab 480 mg         REASON FOR VISIT: Management of my bladder cancer    HISTORY OF PRESENT ILLNESS:   67 y.o.  male presents today for follow-up of his bladder cancer.  He is currently on adjuvant nivolumab.  Tolerating it well.  No significant side effects to it.  He is having a lot of issues with right leg lymphedema from his lymphadenectomy.  Denies any issues with pain fevers.  No issues with headaches.    Past medical history, social history and family history was reviewed 03/03/23 and unchanged from prior visit.    Review of Systems:    Review of Systems   Constitutional: Negative.    HENT:  Negative.    Eyes: Negative.    Respiratory: Positive for shortness of breath.    Cardiovascular: Negative.    Gastrointestinal: Negative.    Endocrine: Negative.    Genitourinary: Negative.     Musculoskeletal: Negative.    Skin: Negative.    Neurological: Negative.    Hematological: Negative.    Psychiatric/Behavioral: Negative.             Medications:        Current Outpatient Medications:   •  albuterol sulfate  (90 Base) MCG/ACT inhaler, Inhale 2 puffs Every 4 (Four) Hours As Needed for Wheezing., Disp: , Rfl:   •  Anoro Ellipta 62.5-25 MCG/INH aerosol powder  inhaler, Inhale 1 puff Daily., Disp: , Rfl:   •  atorvastatin (LIPITOR) 20 MG tablet, Take 1 tablet by mouth Daily., Disp: , Rfl:    •  cephalexin (KEFLEX) 500 MG capsule, , Disp: , Rfl:   •  docusate sodium (Colace) 100 MG capsule, Take 1 capsule by mouth Daily As Needed for Constipation., Disp: 30 capsule, Rfl: 1  •  famotidine (PEPCID) 40 MG tablet, Take 1 tablet by mouth 3 (Three) Times a Day., Disp: , Rfl:   •  fexofenadine (ALLEGRA) 180 MG tablet, Take 1 tablet by mouth Daily., Disp: , Rfl:   •  gabapentin (NEURONTIN) 600 MG tablet, Take 1 tablet by mouth 3 (Three) Times a Day., Disp: , Rfl:   •  HYDROcodone-acetaminophen (NORCO) 7.5-325 MG per tablet, Take 1 tablet by mouth Every 6 (Six) Hours As Needed for Moderate Pain . (Patient taking differently: Take 1 tablet by mouth Every 6 (Six) Hours As Needed for Moderate Pain. Increased  to  mg TID), Disp: 30 tablet, Rfl: 0  •  losartan (COZAAR) 50 MG tablet, Take 1 tablet by mouth Daily., Disp: , Rfl:   •  montelukast (SINGULAIR) 10 MG tablet, Take 1 tablet by mouth Every Night., Disp: , Rfl:   •  multivitamin with minerals tablet tablet, Take 1 tablet by mouth Daily., Disp: , Rfl:   •  nicotine (NICODERM CQ) 14 MG/24HR patch, Place 1 patch on the skin as directed by provider Daily., Disp: , Rfl:   •  ondansetron (ZOFRAN) 8 MG tablet, Take 1 tablet by mouth 3 (Three) Times a Day As Needed for Nausea or Vomiting., Disp: 30 tablet, Rfl: 5  •  polyethylene glycol (MIRALAX) 17 g packet, Take 17 g by mouth Daily., Disp: 30 packet, Rfl: 0  •  tiZANidine (ZANAFLEX) 4 MG tablet, Take 1 tablet by mouth Every 8 (Eight) Hours As Needed., Disp: , Rfl:   •  verapamil ER (VERELAN) 240 MG 24 hr capsule, Take 1 capsule by mouth Every Night., Disp: , Rfl:   No current facility-administered medications for this visit.    Facility-Administered Medications Ordered in Other Visits:   •  Nivolumab (OPDIVO) 480 mg in sodium chloride 0.9 % 148 mL chemo IVPB, 480 mg, Intravenous, Once, Beverly Valladares MD  •  sodium chloride 0.9 % infusion 250 mL, 250 mL, Intravenous, Once, Beverly Valladares,  "MD    Pain Medications             gabapentin (NEURONTIN) 600 MG tablet Take 1 tablet by mouth 3 (Three) Times a Day.    HYDROcodone-acetaminophen (NORCO) 7.5-325 MG per tablet Take 1 tablet by mouth Every 6 (Six) Hours As Needed for Moderate Pain .    tiZANidine (ZANAFLEX) 4 MG tablet Take 1 tablet by mouth Every 8 (Eight) Hours As Needed.             ALLERGIES:    Allergies   Allergen Reactions   • Fluticasone-Salmeterol Unknown - Low Severity     Thrush   • Spiriva Respimat [Tiotropium Bromide Monohydrate] Unknown - Low Severity     Thrush   • Tetanus Toxoids Swelling   • Food Color Orange [Yellow Dye] Nausea Only   • Naproxen Nausea Only   • Orange Flavor GI Intolerance         Physical Exam    VITAL SIGNS:  /65 Comment: RUE  Pulse 80   Temp 97.1 °F (36.2 °C) (Infrared)   Resp 24   Ht 182.9 cm (72\")   Wt 127 kg (280 lb)   SpO2 99% Comment: RA  BMI 37.97 kg/m²     ECOG score: 1           Wt Readings from Last 3 Encounters:   03/03/23 127 kg (280 lb)   02/03/23 122 kg (268 lb 1.6 oz)   01/06/23 119 kg (262 lb 4.8 oz)       Body mass index is 37.97 kg/m². Body surface area is 2.46 meters squared.       Performance Status: 0    General: well appearing, in no acute distress  HEENT: sclera anicteric, neck is supple  Lymphatics: no cervical, supraclavicular, or axillary adenopathy  Cardiovascular: regular rate and rhythm, no murmurs, rubs or gallops  Lungs: clear to auscultation bilaterally  Abdomen: soft, nontender, nondistended.  No palpable organomegaly  Extremities: no lower extremity edema  Skin: no rashes, lesions, bruising, or petechiae  Msk:  Shows no weakness of the large muscle groups  Psych: Mood is stable        RECENT LABS:    Lab Results   Component Value Date    HGB 11.6 (L) 03/03/2023    HCT 34.4 (L) 03/03/2023    MCV 83.5 03/03/2023     03/03/2023    WBC 9.62 03/03/2023    NEUTROABS 7.41 (H) 03/03/2023    LYMPHSABS 0.91 03/03/2023    MONOSABS 0.79 03/03/2023    EOSABS 0.34 " 03/03/2023    BASOSABS 0.09 03/03/2023       Lab Results   Component Value Date    GLUCOSE 107 (H) 03/03/2023    BUN 12 03/03/2023    CREATININE 1.36 (H) 03/03/2023     03/03/2023    K 4.4 03/03/2023     03/03/2023    CO2 21.9 (L) 03/03/2023    CALCIUM 9.3 03/03/2023    PROTEINTOT 6.8 03/03/2023    ALBUMIN 3.7 03/03/2023    BILITOT 0.5 03/03/2023    ALKPHOS 102 03/03/2023    AST 28 03/03/2023    ALT 32 03/03/2023     CT Chest With Contrast Diagnostic    Addendum Date: 2/3/2023    Addendum: Exam compared to previous PET CT of 09/20/2022. Intermediate attenuation suggesting right gynecomastia is stable from the prior exam. The nodule seen on prior exam in the right middle lobe and associated with bullous change is stable. Again noted is interstitial disease bilaterally. Previously there was airspace disease posteriorly in the right upper lobe which has resolved. Bilateral dependent interstitial edema is similar to the prior study. 2, subcentimeter right lower lobe nodules are stable from prior. Fissural nodule on the left is stable. Sclerotic area in the right T3 vertebral body appears similar to the prior exam and did not show any increased uptake on the PET CT, likely asymmetric degenerative change.  IMPRESSION: No significant change from previous PET CT.  This report was signed and finalized on 2/3/2023 4:19 PM by Massiel Jack MD.    Result Date: 2/3/2023  Several bilateral circumscribed noncalcified pulmonary nodules, many fissural and likely representing lymph nodes but no prior study plus a sclerotic lesion in the T3 vertebral body; recommend PET/CT to evaluate for metastases.  Emphysematous change.  Suspected right gynecomastia, recommend ultrasound.  This report was signed and finalized on 1/31/2023 4:01 PM by Massiel Jack MD.    CT Abdomen Pelvis With Contrast    Addendum Date: 2/3/2023    Addendum: Images compared to previous PET CT of 09/20/2022. Hypermetabolic activity was seen at the urostomy  site; the appearance the urostomy site is stable from the prior exam. The abdominal wall hernia appears similar to the prior exam. The right extrarenal pelvis on the right is stable. No IV contrast given with the PET/CT so cannot evaluate for enhancement of the wall the right renal pelvis. Right perirenal stranding does appears stable. Hypodense lesions in the left kidney are stable. Left extrarenal pelvis is slightly smaller compared to the prior exam. Bilateral hydroceles appears similar to the prior exam.  IMPRESSION: No significant change compared to the prior study.  This report was signed and finalized on 2/3/2023 4:10 PM by Massiel Jack MD.    Result Date: 2/3/2023  Right kidney smaller than the left with cortical thinning and mild hydronephrosis with enhancement of the walls of the right renal pelvis, consider pyelitis.  Extrarenal left pelvis and simple appearing renal cystic lesion, otherwise normal appearance to left kidney.  Postsurgical change from urinary diversion.  Midline abdominal wall hernia containing nonobstructed small bowel loops.  Bilateral hydroceles.  This report was signed and finalized on 1/31/2023 4:28 PM by Massiel Jack MD.          Assessment/Plan    1.  Stage IIIa high-grade urothelial carcinoma of the bladder s/p cystectomy.  I reviewed his scans and compared it to his prior scans.  He does have a lesion at his T3 area.  But that is stable from the PET scan that I had initially done.  I do not see any change with that.  I looked at the images with him and his wife today.  For now proceed with nivolumab maintenance Plan to treat him for 1 year.  Repeat scans in May.  No changes to his dose for now.  High risk of recurrent disease.    2.  Severe lymphedema secondary to lymphadenectomy in the right leg.  Plan for sending him for physical therapy with the lymphedema person.  He may consider lymph node transplant at some point if he is stable for years without any progression of his  disease.  The only place that does not close by his [facility].    Total time of patient care on day of service including time prior to, face to face with patient, and following visit spent in reviewing records, lab results, imaging studies, discussion with patient, and documentation/charting was > 32 minutes.     Beverly Valladares MD  [facility] Hematology and Oncology    Return in (Approximately): 2 months, Schedule with next infusion    Orders Placed This Encounter   Procedures   • Comprehensive metabolic panel   • TSH   • T4, free   • Comprehensive metabolic panel   • TSH   • T4, free   • CBC and Differential   • CBC and Differential       3/3/2023

## 2023-03-17 ENCOUNTER — HOSPITAL ENCOUNTER (OUTPATIENT)
Dept: PHYSICAL THERAPY | Facility: HOSPITAL | Age: 68
Discharge: HOME | End: 2023-03-17
Admitting: INTERNAL MEDICINE
Payer: MEDICARE

## 2023-03-17 DIAGNOSIS — C67.4 MALIGNANT NEOPLASM OF POSTERIOR WALL OF URINARY BLADDER: ICD-10-CM

## 2023-03-17 DIAGNOSIS — I89.0 LYMPHEDEMA OF RIGHT LOWER EXTREMITY: ICD-10-CM

## 2023-03-17 PROCEDURE — 97162 PT EVAL MOD COMPLEX 30 MIN: CPT

## 2023-03-17 NOTE — THERAPY EVALUATION
Physical Therapy Lymphedema Initial Evaluation  [facility]     Patient Name: Tru Buitrago  : 1955  MRN: [id number]  Today's Date: 3/17/2023      Visit Date: 2023    Visit Dx:    ICD-10-CM ICD-9-CM   1. Malignant neoplasm of posterior wall of urinary bladder (HCC)  C67.4 188.4   2. Lymphedema of right lower extremity  I89.0 457.1       Patient Active Problem List   Diagnosis   • Malignant neoplasm of posterior wall of urinary bladder (HCC)   • COPD (chronic obstructive pulmonary disease) (HCC)   • GERD (gastroesophageal reflux disease)   • HTN (hypertension)   • HLD (hyperlipidemia)   • Smoker   • Renal insufficiency   • S/P cysto-prostatectomy with ileal conduit    • Acute postoperative pain   • Leukocytosis, mild, likely reactive   • Acute blood loss anemia, asymptomatic   • Postoperative nausea   • Anemia        Past Medical History:   Diagnosis Date   • Bladder cancer (HCC)    • COPD (chronic obstructive pulmonary disease) (HCC)    • GERD (gastroesophageal reflux disease)    • Hyperlipidemia    • Hypertension    • Postoperative nausea 8/3/2022        Past Surgical History:   Procedure Laterality Date   • COLONOSCOPY         • CYSTECTOMY N/A 2022    Procedure: CYSTO-PROSTATECTOMY, PELVIC LYMPH NODE DISSECTION LAPAROSCOPIC WITH DAVINCI ROBOT, CREATION OF ILEAL CONDUIT;  Surgeon: Godfrey Lawrence Jr., MD;  Location: [facility];  Service: Robotics - DaVinci;  Laterality: N/A;   • CYSTOSCOPY BLADDER BIOPSY         Visit Dx:    ICD-10-CM ICD-9-CM   1. Malignant neoplasm of posterior wall of urinary bladder (HCC)  C67.4 188.4   2. Lymphedema of right lower extremity  I89.0 457.1        Patient History     Row Name 23 1300             History    Chief Complaint Swelling;Pain;Difficulty Walking;Difficulty with daily activities  -LF      Type of Pain Lower Extremity / Leg  -LF      Date Current Problem(s) Began --  1 month  -LF      Brief Description of Current Complaint   Minna presents with RLE swelling following treatment for bladder cancer.   He is currently on adjuvant Nivolumab.  He had surgery August 2 cystoprostectomy w/ pelvic LND and creation of ileal conduit.   He reports intermittent swelling into hip and buttocks.  Onset 1 month ago.  Has had doppler to ruleout DVT.  Lasix does not help. He sleeps with foot of bed elevated.  Goes down very little at night.  Sits with legs elevated in recliner during the day.  Limited distance walking due to heaviness and pain in the leg.    He has been wearing thigh high OCTAVIO stocking.   He is scheduled for CT scan and follow-up with urology on Monday.  -LF      Patient/Caregiver Goals Relieve pain;Improve mobility;Decrease swelling;Know what to do to help the symptoms  -LF      Occupation/sports/leisure activities retired LPN and   -LF         Pain     Pain Location Leg  -LF      Pain at Present 8  -LF      Pain at Best 5  -LF      Pain at Worst 10  -LF      Pain Frequency Constant/continuous  -LF      Pain Description Aching;Tightness  -LF         Fall Risk Assessment    Any falls in the past year: No  -LF         Daily Activities    Primary Language English  -LF      How does patient learn best? Listening;Demonstration;Reading  -LF      Barriers to learning None  -LF      Pt Participated in POC and Goals Yes  -LF            User Key  (r) = Recorded By, (t) = Taken By, (c) = Cosigned By    Initials Name Provider Type    LF Yaneth Marcos, PT Physical Therapist                 Lymphedema     Row Name 03/17/23 1300             Lymphedema Assessment    Lymphedema Classification secondary;RLE:;stage 2 (Spontaneously Irreversible);Trunk:;stage 1 (Spontaneously Reversible)  -LF      Lymphedema Cancer Related Sx other (comment)  cysto-prostectomy with ileconduit  -LF      Lymph Nodes Removed # --  unspecified pelvic LND  -LF      Positive Lymph Nodes # 0  -LF      Cancer Comments Stage IIIA high-grade urothelial carcinoma of the  bladder s/p cystectomy  -LF      Chemo Received yes  -LF      Infections or Cellulitis? no  -LF      Lymphedema Precautions other (comment)  urostomy  -LF         Posture/Observations    Posture/Observations Comments ambulates with walking stick, antalgic gait, decreased knee flex  -LF         General ROM    GENERAL ROM COMMENTS Limited knee flexion due to swelling.  Able to reach to foot.  -LF         MMT (Manual Muscle Testing)    General MMT Comments deferred MMT.  WFL's for transfers, ambulation  -LF         Lymphedema Edema Assessment    Ptting Edema Category By severity  -LF      Pitting Edema Moderate;Severe  -LF      Stemmer Sign right:;positive  -LF      Edema Assessment Comment very firm pitting edema ankle to groin, softer into lateral hip.  and soft-pitting at foot.  -LF         Skin Changes/Observations    Location/Assessment Lower Extremity  -LF      Lower Extremity Conditions right:;intact;clean  -LF      Lower Extremity Color/Pigment right:;erythema;fibrosis  mild  -LF         Lymphedema Pulses/Capillary Refill    Lymphedema Pulses/Capillary Refill lower extremity pulses;capillary refill  -LF      Dorsalis Pedis Pulse right:  unable to detect with swelling  -LF      Capillary Refill lower extremity capillary refill  -LF      Lower Extremity Capillary Refill right:;less than 3 seconds  -LF         Lymphedema Measurements    Measurement Type(s) Quick Girth  -LF      Quick Girth Areas Lower extremities  -LF         LLE Quick Girth (cm)    Met-heads 25 cm  -LF      Mid foot 25 cm  -LF      Smallest ankle 23.9 cm  -LF      Largest calf 42.5 cm  -LF      Tib tuberosity 36.8 cm  -LF      Mid patella 43.8 cm  -LF      Distal thigh 45.3 cm  -LF      Proximal thigh 59 cm  -LF      Other 1 28.8 cm  10cm. prox. ankle  -LF      Other 2 51.8 cm  10cm. prox. DT  -LF         RLE Quick Girth (cm)    Met-heads 27.8 cm  -LF      Mid foot 28 cm  -LF      Smallest ankle 31 cm  -LF      Largest calf 55.4 cm  -LF      Tib  tuberosity 52.5 cm  -LF      Mid patella 54.5 cm  -LF      Distal thigh 61.5 cm  -LF      Proximal thigh 77.8 cm  -LF      Other 1 40.8 cm  10cm. prox. ankle  -LF      Other 2 68.2 cm  10cm .prox. DT  -LF      RLE Quick Girth Total 497.5  -LF         Manual Lymphatic Drainage    Manual Lymphatic Drainage initial sequence;opened regional lymph nodes;opened anastamoses;extremity treatment  -LF      Initial Sequence supraclavicular;shoulder collectors;diaphragmatic breathing;abdomen  -LF      Supraclavicular right;left  -LF      Shoulder Collectors right;left  -LF      Abdomen Comment deferred due to urostomy  -LF      Diaphragmatic Breathing completed  -LF      Opened Regional Lymph Nodes inguinal;axillary  -LF      Axillary right  -LF      Inguinal right;left  -LF      Opened Anastamoses inguino-axillary  -LF      Inguino-Axillary right  -LF      Extremity Treatment simple/brief MLD  -LF      Simple/Brief MLD RLE with focus at thigh.  Tissue softened with treatment  -LF         Compression/Skin Care    Compression/Skin Care skin care;bandaging;compression garment;wrapping location  -LF      Skin Care moisturizing lotion applied  -LF      Wrapping Location lower extremity  -LF      Wrapping Location LE right:;foot to groin  -LF      Bandage Layers cotton elastic stocking- single layer (comment size);cotton elastic stocking- double layer (comment size)  -LF      Bandaging Comments Compressogrip:  size 5 MTH to distal calf and doubled, size 6 ankle to knee, size 10 knee to groin and doubled 2/3rds.  Also issued size 8 to use at knee if decides this fits better  -LF      Compression Garment Comments Issued Juxtalite HD LFC and applied over compressigrip to lower leg.  Issued cast padding to use at anterior ankle to use prn to protect skin if any issue with material binding.  Educated to adjust prn - should be comfortable.  Can wear compressogrip at night, or switch into thigh high OCTAVIO  -LF            User Key  (r) =  Recorded By, (t) = Taken By, (c) = Cosigned By    Initials Name Provider Type    Yaneth Hills, PT Physical Therapist              Therapy Education  Education Details: Educated on self-MLD sequence.  Encouraged regular DB and proximal sequences.  Educated on use of compressogrip and lower leg velcro garment issued today.  Reinforced importance that compression should be comfortable.  Should remove if increased pain, or causes increased swelling more proximal to the compression.  Discussed modfications to the compression if this occurs.  Advised to call with any questions or concerns.  Discussed looking for athletic compression shorts or leggings  Given: HEP, Symptoms/condition management, Edema management  Program: New  How Provided: Verbal, Demonstration, Written  Provided to: Patient, Caregiver  Level of Understanding: Teach back education performed, Verbalized, Demonstrated           PT OP Goals     Row Name 03/17/23 1300          PT Short Term Goals    STG Date to Achieve 04/14/23  -LF     STG 1 Patient independent with simple compression to promote edema reduction  -LF     STG 1 Progress New  -LF     STG 2 Rigth knee and thigh measurements decreased by at least 3cm. each to demonstrate symptom reduction  -LF     STG 2 Progress New  -LF     STG 3 Patient able to get in/out of their car without difficulty  -LF     STG 3 Progress New  -LF     STG 4 LEFS score improved by 15 points.  -LF     STG 5 Right leg pain improved by 50% to allow for improved ability for daily activities including to walk household distances  -LF     STG 5 Progress New  -LF        Long Term Goals    LTG Date to Achieve 04/28/23  -LF     LTG 1 Pt to be measured and fit with compression garment/system for long term self management of lymphedema  -LF     LTG 1 Progress New  -LF     LTG 2 Patient independent with self-management of RLE lymphedema to include skin care, self-MLD, compression, and exercise  -LF     LTG 2 Progress New  -LF      LTG 3 LE total circumferential measurements decreased by at least 40 cm. to promote decrease pain, improved ROM, and decrease risk for infection  -LF     LTG 3 Progress New  -LF     LTG 4 Patient able to navigate flight of stairs with mild to no difficulty  -LF     LTG 4 Progress New  -LF        Time Calculation    PT Goal Re-Cert Due Date 06/15/23  -LF           User Key  (r) = Recorded By, (t) = Taken By, (c) = Cosigned By    Initials Name Provider Type    LF Yaneth Marcos, PT Physical Therapist                 PT Assessment/Plan     Row Name 03/17/23 1300          PT Assessment    Functional Limitations Impaired gait;Limitation in home management;Limitations in community activities;Performance in leisure activities;Performance in self-care ADL;Other (comment)  lymphedema management  -LF     Impairments Edema;Gait;Impaired flexibility;Impaired lymphatic circulation;Pain;Range of motion  -LF     Assessment Comments Patient presents with signs and symptoms consistent with secondary lymphedema RLE following surgical intervention and treatment for bladder cancer.  He has swelling, pain, decreased ROM, and impaired functional mobility.  Further treatment indicated to minimize symptoms and assist with long-term management to allow for improved mobility and QOL, as well as decreased pain and decreased risk for infection.  He would benefit from pnuematic compression pump to improve lymphatic return and assist with self-management of symptoms.  Brief MLD completed today and able to decongest some at thigh, with noted softening of tissue.  -LF     Please refer to paper survey for additional self-reported information Yes  -LF     Rehab Potential Good  -LF     Patient/caregiver participated in establishment of treatment plan and goals Yes  -LF     Patient would benefit from skilled therapy intervention Yes  -LF        PT Plan    PT Frequency 2x/week;1x/week  -LF     Predicted Duration of Therapy Intervention (PT) 8-10  visits.  Treatment frequenncy limited as patient lives out of town  -LF     Planned CPT's? PT EVAL MOD COMPLELITY: 00659;PT THER PROC EA 15 MIN: 01187;PT THER ACT EA 15 MIN: 50561;PT MANUAL THERAPY EA 15 MIN: 54215;PT SELF CARE/HOME MGMT/TRAIN EA 15: 77941  -LF     PT Plan Comments cont. per POC for CDT treatment RLE lymphedema  -LF           User Key  (r) = Recorded By, (t) = Taken By, (c) = Cosigned By    Initials Name Provider Type    Yaneth Hills PT Physical Therapist                   Outcome Measure Options: Lower Extremity Functional Scale (LEFS)  Lower Extremity Functional Index  Any of your usual work, housework or school activities: Quite a bit of difficulty  Your usual hobbies, recreational or sporting activities: Quite a bit of difficulty  Getting into or out of the bath: Moderate difficulty  Walking between rooms: A little bit of difficulty  Putting on your shoes or socks: Quite a bit of difficulty  Squatting: Quite a bit of difficulty  Lifting an object, like a bag of groceries from the floor: A little bit of difficulty  Performing light activities around your home: A little bit of difficulty  Performing heavy activities around your home: Moderate difficulty  Getting into or out of a car: Moderate difficulty  Walking 2 blocks: Moderate difficulty  Walking a mile: Quite a bit of difficulty  Going up or down 10 stairs (about 1 flight of stairs): Moderate difficulty  Standing for 1 hour: Moderate difficulty  Sitting for 1 hour: A little bit of difficulty  Running on even ground: Quite a bit of difficulty  Running on uneven ground: Quite a bit of difficulty  Making sharp turns while running fast: Quite a bit of difficulty  Hopping: Quite a bit of difficulty  Rolling over in bed: A little bit of difficulty  Total: 36      Time Calculation:   Start Time: 1300  Untimed Charges  PT Eval/Re-eval Minutes: 80  Total Minutes  Untimed Charges Total Minutes: 80   Total Minutes: 80   Therapy Charges for  Today     Code Description Service Date Service Provider Modifiers Qty    86284259751 HC PT EVAL MOD COMPLEXITY 4 3/17/2023 Yaneth Marcos, PT GP 1          PT G-Codes  Outcome Measure Options: Lower Extremity Functional Scale (LEFS)  Total: 36         Yaneth Marcos, PT  3/17/2023

## 2023-03-20 ENCOUNTER — TELEPHONE (OUTPATIENT)
Dept: ONCOLOGY | Facility: CLINIC | Age: 68
End: 2023-03-20
Payer: MEDICARE

## 2023-03-20 DIAGNOSIS — C67.4 MALIGNANT NEOPLASM OF POSTERIOR WALL OF URINARY BLADDER: Primary | ICD-10-CM

## 2023-03-20 NOTE — TELEPHONE ENCOUNTER
----- Message from Duran Rayo sent at 3/20/2023  3:02 PM EDT -----  Regarding: DR RODOLFO JOHNSON CALLING TO SPEAK WITH DR RADHA ANAND NEEDS TO SPEAK WITH DR GARCIA HIS CELL NUMBER [phone]

## 2023-03-21 ENCOUNTER — HOSPITAL ENCOUNTER (OUTPATIENT)
Dept: PHYSICAL THERAPY | Facility: HOSPITAL | Age: 68
Setting detail: THERAPIES SERIES
Discharge: HOME | End: 2023-03-21
Payer: MEDICARE

## 2023-03-21 DIAGNOSIS — I89.0 LYMPHEDEMA OF RIGHT LOWER EXTREMITY: ICD-10-CM

## 2023-03-21 DIAGNOSIS — C67.4 MALIGNANT NEOPLASM OF POSTERIOR WALL OF URINARY BLADDER: Primary | ICD-10-CM

## 2023-03-21 PROCEDURE — 97140 MANUAL THERAPY 1/> REGIONS: CPT

## 2023-03-22 NOTE — THERAPY TREATMENT NOTE
Outpatient Physical Therapy Lymphedema Treatment Note  [facility]     Patient Name: Tru Buitrago  : 1955  MRN: [id number]  Today's Date: 3/22/2023        Visit Date: 2023    Visit Dx:    ICD-10-CM ICD-9-CM   1. Malignant neoplasm of posterior wall of urinary bladder (HCC)  C67.4 188.4   2. Lymphedema of right lower extremity  I89.0 457.1       Patient Active Problem List   Diagnosis   • Malignant neoplasm of posterior wall of urinary bladder (HCC)   • COPD (chronic obstructive pulmonary disease) (HCC)   • GERD (gastroesophageal reflux disease)   • HTN (hypertension)   • HLD (hyperlipidemia)   • Smoker   • Renal insufficiency   • S/P cysto-prostatectomy with ileal conduit    • Acute postoperative pain   • Leukocytosis, mild, likely reactive   • Acute blood loss anemia, asymptomatic   • Postoperative nausea   • Anemia         Lymphedema     Row Name 23 8100             Subjective Pain    Able to rate subjective pain? yes  -LF      Pre-Treatment Pain Level 4  -LF      Post-Treatment Pain Level 4  -LF         Subjective Comments    Subjective Comments Has been wearing compression.  He modified the compressogrip to single-layer as the double layer was bunching too much.  Worked on self-MLD at home.  Reports he had CT yesterday and follow-up with urologist.  Reports some sort of pelvic compression or obstruction (not specific) that he said doctor felt was contributing to leg swelling.  Has started on Elliquis.  Urologist aware patient already seeing lymphedema therapist and says they discussed the compression pump.  -LF         Lymphedema Edema Assessment    Ptting Edema Category By severity  -LF      Pitting Edema Moderate;Severe  -LF      Stemmer Sign right:;positive  -LF      Edema Assessment Comment firm pitting edema ankle to groin, softer into lateral hip.  and soft-pitting at foot.  -LF         Skin Changes/Observations    Location/Assessment Lower Extremity  -LF      Lower  Extremity Conditions right:;intact;clean  -LF      Lower Extremity Color/Pigment right:;erythema;fibrosis  -LF         Lymphedema Measurements    Measurement Type(s) Quick Girth  -LF      Quick Girth Areas Lower extremities  -LF         RLE Quick Girth (cm)    Mid foot 28.5 cm  -LF      Smallest ankle 31 cm  -LF      Distal thigh 62 cm  -LF      Proximal thigh 77 cm  -LF      Other 2 70.8 cm  -LF      RLE Quick Girth Total 269.3  -LF         Manual Lymphatic Drainage    Manual Lymphatic Drainage extremity treatment;opened regional lymph nodes;opened anastamoses  -LF      Opened Regional Lymph Nodes axillary;inguinal  -LF      Axillary right  -LF      Inguinal right  -LF      Opened Anastamoses inguino-axillary  -LF      Inguino-Axillary right  -LF      Extremity Treatment MLD to proximal limb only  -LF      Simple/Brief MLD RLE with focus at thigh.  Tissue softened with treatment  -LF         Compression/Skin Care    Compression/Skin Care bandaging;compression garment  -LF      Bandage Layers cotton elastic stocking- single layer (comment size);cotton elastic stocking- double layer (comment size)  -LF      Bandaging Comments Compressogrip:  size 5 MTH to distal calf, size 6 ankle to knee, size 10 knee to groin.  -LF      Compression Garment Comments Reapplied lower leg velcro garment for mild compression  -LF            User Key  (r) = Recorded By, (t) = Taken By, (c) = Cosigned By    Initials Name Provider Type     Yaneth Marcos, PT Physical Therapist                   PT Assessment/Plan     Row Name 03/21/23 2938          PT Assessment    Assessment Comments proximal thigh not as tight with swelling, and has improved soft-tissue mobility compared to last week.  Lower leg still fairly firm.  Have referred for compression pump.  Cleared with referring MD and fabiola to continue with treatment, and for compression pump.  -LF        PT Plan    PT Plan Comments cont. per POC.  -LF           User Key  (r) = Recorded  By, (t) = Taken By, (c) = Cosigned By    Initials Name Provider Type     Yaneth Marcos PT Physical Therapist                    OP Exercises     Row Name 03/21/23 1740 03/21/23 1330          Subjective Comments    Subjective Comments -- Has been wearing compression.  He modified the compressogrip to single-layer as the double layer was bunching too much.  Worked on self-MLD at home.  Reports he had CT yesterday and follow-up with urologist.  Reports some sort of pelvic compression or obstruction (not specific) that he said doctor felt was contributing to leg swelling.  Has started on Elliquis.  Urologist aware patient already seeing lymphedema therapist and says they discussed the compression pump.  -LF        Subjective Pain    Able to rate subjective pain? -- yes  -LF     Pre-Treatment Pain Level -- 4  -LF     Post-Treatment Pain Level -- 4  -LF        Total Minutes    08491 - PT Manual Therapy Minutes 30  -LF --           User Key  (r) = Recorded By, (t) = Taken By, (c) = Cosigned By    Initials Name Provider Type     Yaneth Marcos PT Physical Therapist                        Manual Rx (last 36 hours)     Manual Treatments     Row Name 03/21/23 1740             Total Minutes    43739 - PT Manual Therapy Minutes 30  -LF            User Key  (r) = Recorded By, (t) = Taken By, (c) = Cosigned By    Initials Name Provider Type     Yaneth Marcos PT Physical Therapist                       Time Calculation:   Start Time: 1330  Timed Charges  63237 - PT Manual Therapy Minutes: 30  Total Minutes  Timed Charges Total Minutes: 30   Total Minutes: 30   Therapy Charges for Today     Code Description Service Date Service Provider Modifiers Qty    25462613013  PT MANUAL THERAPY EA 15 MIN 3/21/2023 Yaneth Marcos PT GP 1                    Yaneth Marcos PT  3/22/2023

## 2023-03-24 ENCOUNTER — APPOINTMENT (OUTPATIENT)
Dept: PHYSICAL THERAPY | Facility: HOSPITAL | Age: 68
End: 2023-03-24
Payer: MEDICARE

## 2023-03-28 ENCOUNTER — HOSPITAL ENCOUNTER (OUTPATIENT)
Dept: PHYSICAL THERAPY | Facility: HOSPITAL | Age: 68
Setting detail: THERAPIES SERIES
Discharge: HOME | End: 2023-03-28
Payer: MEDICARE

## 2023-03-28 DIAGNOSIS — I89.0 LYMPHEDEMA OF RIGHT LOWER EXTREMITY: ICD-10-CM

## 2023-03-28 DIAGNOSIS — C67.4 MALIGNANT NEOPLASM OF POSTERIOR WALL OF URINARY BLADDER: Primary | ICD-10-CM

## 2023-03-28 PROCEDURE — 97140 MANUAL THERAPY 1/> REGIONS: CPT

## 2023-03-28 NOTE — THERAPY TREATMENT NOTE
Outpatient Physical Therapy Lymphedema Treatment Note  [facility]     Patient Name: Tru Buitrago  : 1955  MRN: [id number]  Today's Date: 3/28/2023        Visit Date: 2023    Visit Dx:    ICD-10-CM ICD-9-CM   1. Malignant neoplasm of posterior wall of urinary bladder (HCC)  C67.4 188.4   2. Lymphedema of right lower extremity  I89.0 457.1       Patient Active Problem List   Diagnosis   • Malignant neoplasm of posterior wall of urinary bladder (HCC)   • COPD (chronic obstructive pulmonary disease) (HCC)   • GERD (gastroesophageal reflux disease)   • HTN (hypertension)   • HLD (hyperlipidemia)   • Smoker   • Renal insufficiency   • S/P cysto-prostatectomy with ileal conduit    • Acute postoperative pain   • Leukocytosis, mild, likely reactive   • Acute blood loss anemia, asymptomatic   • Postoperative nausea   • Anemia         Lymphedema     Row Name 23 1400             Subjective Pain    Able to rate subjective pain? yes  -LF      Pre-Treatment Pain Level 5  -LF      Post-Treatment Pain Level 5  -LF         Subjective Comments    Subjective Comments Swelling is a little better overall, but still has pain, tightness, heaviness from the swelling  -LF         Lymphedema Edema Assessment    Ptting Edema Category By severity  -LF      Pitting Edema Moderate;Severe  -LF      Stemmer Sign right:;positive  -LF         Skin Changes/Observations    Location/Assessment Lower Extremity  -LF      Lower Extremity Conditions right:;intact;clean  -LF      Lower Extremity Color/Pigment right:;erythema;fibrosis  -LF         Lymphedema Measurements    Measurement Type(s) Quick Girth  -LF      Quick Girth Areas Lower extremities  -LF         RLE Quick Girth (cm)    Met-heads 28 cm  -LF      Mid foot 28.8 cm  -LF      Smallest ankle 30.3 cm  -LF      Largest calf 54 cm  -LF      Tib tuberosity 50.4 cm  -LF      Mid patella 56.8 cm  -LF      Distal thigh 61 cm  -LF      Proximal thigh 77.8 cm  -LF       Other 2 68.5 cm  10cm. prox DT  -LF      RLE Quick Girth Total 455.6  -LF         Manual Lymphatic Drainage    Manual Lymphatic Drainage extremity treatment;opened regional lymph nodes;opened anastamoses  -LF      Initial Sequence supraclavicular;shoulder collectors;diaphragmatic breathing;abdomen  -LF      Supraclavicular right;left  -LF      Shoulder Collectors right;left  -LF      Opened Regional Lymph Nodes axillary;inguinal  -LF      Axillary right  -LF      Inguinal right  -LF      Opened Anastamoses inguino-axillary  -LF      Inguino-Axillary right  -LF      Extremity Treatment MLD to full limb  -LF      MLD to Full Limb RLE, firm static holds to help soften and decongest  -LF         Compression/Skin Care    Compression/Skin Care bandaging;compression garment  -LF      Bandage Layers cotton elastic stocking- single layer (comment size);cotton elastic stocking- double layer (comment size)  -LF      Bandaging Comments Compressogrip:  size 5 MTH to distal calf and doubled over forefoot, size 6 ankle to knee, size 10 knee to groin.  -LF      Compression Garment Comments Reapplied lower leg velcro garment for mild compression  -LF      Compression/Skin Care Comments Elastic taping:  anchored medial distal calf and fanned across forefoot and along toes.  Also provided with gray foam pad to use at forefoot. Instructed how to secure with compressogrip  -LF            User Key  (r) = Recorded By, (t) = Taken By, (c) = Cosigned By    Initials Name Provider Type     Yaneth Marcos, PT Physical Therapist                 PT Assessment/Plan     Row Name 03/28/23 1400          PT Assessment    Assessment Comments continues with significant RLE edema/lymphedema.  Thigh is softer except for medial aspect.  Lower leg with firm swelling. Incorportated elastic taping at foot and foam to help further reduce.  Have referred for compression pump  -LF        PT Plan    PT Plan Comments cont. per POC  -LF           User Key   (r) = Recorded By, (t) = Taken By, (c) = Cosigned By    Initials Name Provider Type     Yaneth Marcos PT Physical Therapist                    OP Exercises     Row Name 03/28/23 1400             Subjective Comments    Subjective Comments Swelling is a little better overall, but still has pain, tightness, heaviness from the swelling  -LF         Subjective Pain    Able to rate subjective pain? yes  -LF      Pre-Treatment Pain Level 5  -LF      Post-Treatment Pain Level 5  -LF         Total Minutes    06815 - PT Manual Therapy Minutes 60  -LF            User Key  (r) = Recorded By, (t) = Taken By, (c) = Cosigned By    Initials Name Provider Type     Yaneth Marcos PT Physical Therapist                        Manual Rx (last 36 hours)     Manual Treatments     Row Name 03/28/23 1400             Total Minutes    43379 - PT Manual Therapy Minutes 60  -LF            User Key  (r) = Recorded By, (t) = Taken By, (c) = Cosigned By    Initials Name Provider Type     Yaneth Marcos PT Physical Therapist                       Time Calculation:   Start Time: 1400  Timed Charges  75560 - PT Manual Therapy Minutes: 60  Total Minutes  Timed Charges Total Minutes: 60   Total Minutes: 60   Therapy Charges for Today     Code Description Service Date Service Provider Modifiers Qty    [id number]  PT MANUAL THERAPY EA 15 MIN 3/28/2023 Yaneth Marcos, PT GP 4                    Yaneth Marcos PT  3/28/2023

## 2023-03-31 ENCOUNTER — INFUSION (OUTPATIENT)
Dept: ONCOLOGY | Facility: HOSPITAL | Age: 68
End: 2023-03-31
Payer: MEDICARE

## 2023-03-31 VITALS
RESPIRATION RATE: 18 BRPM | SYSTOLIC BLOOD PRESSURE: 139 MMHG | DIASTOLIC BLOOD PRESSURE: 73 MMHG | TEMPERATURE: 97.9 F | BODY MASS INDEX: 37.83 KG/M2 | WEIGHT: 278.9 LBS | OXYGEN SATURATION: 93 % | HEART RATE: 79 BPM

## 2023-03-31 DIAGNOSIS — C67.4 MALIGNANT NEOPLASM OF POSTERIOR WALL OF URINARY BLADDER: Primary | ICD-10-CM

## 2023-03-31 LAB
ALBUMIN SERPL-MCNC: 3.7 G/DL (ref 3.5–5.2)
ALBUMIN/GLOB SERPL: 1.1 G/DL
ALP SERPL-CCNC: 93 U/L (ref 39–117)
ALT SERPL W P-5'-P-CCNC: 9 U/L (ref 1–41)
ANION GAP SERPL CALCULATED.3IONS-SCNC: 9.9 MMOL/L (ref 5–15)
AST SERPL-CCNC: 11 U/L (ref 1–40)
BASOPHILS # BLD AUTO: 0.09 10*3/MM3 (ref 0–0.2)
BASOPHILS NFR BLD AUTO: 1 % (ref 0–1.5)
BILIRUB SERPL-MCNC: 0.3 MG/DL (ref 0–1.2)
BUN SERPL-MCNC: 14 MG/DL (ref 8–23)
BUN/CREAT SERPL: 9.9 (ref 7–25)
CALCIUM SPEC-SCNC: 9.3 MG/DL (ref 8.6–10.5)
CHLORIDE SERPL-SCNC: 100 MMOL/L (ref 98–107)
CO2 SERPL-SCNC: 24.1 MMOL/L (ref 22–29)
CREAT SERPL-MCNC: 1.41 MG/DL (ref 0.76–1.27)
DEPRECATED RDW RBC AUTO: 45.4 FL (ref 37–54)
EGFRCR SERPLBLD CKD-EPI 2021: 55.3 ML/MIN/1.73
EOSINOPHIL # BLD AUTO: 0.33 10*3/MM3 (ref 0–0.4)
EOSINOPHIL NFR BLD AUTO: 3.8 % (ref 0.3–6.2)
ERYTHROCYTE [DISTWIDTH] IN BLOOD BY AUTOMATED COUNT: 15 % (ref 12.3–15.4)
GLOBULIN UR ELPH-MCNC: 3.3 GM/DL
GLUCOSE SERPL-MCNC: 92 MG/DL (ref 65–99)
HCT VFR BLD AUTO: 35 % (ref 37.5–51)
HGB BLD-MCNC: 11.5 G/DL (ref 13–17.7)
IMM GRANULOCYTES # BLD AUTO: 0.05 10*3/MM3 (ref 0–0.05)
IMM GRANULOCYTES NFR BLD AUTO: 0.6 % (ref 0–0.5)
LYMPHOCYTES # BLD AUTO: 1.24 10*3/MM3 (ref 0.7–3.1)
LYMPHOCYTES NFR BLD AUTO: 14.2 % (ref 19.6–45.3)
MCH RBC QN AUTO: 27.5 PG (ref 26.6–33)
MCHC RBC AUTO-ENTMCNC: 32.9 G/DL (ref 31.5–35.7)
MCV RBC AUTO: 83.7 FL (ref 79–97)
MONOCYTES # BLD AUTO: 0.67 10*3/MM3 (ref 0.1–0.9)
MONOCYTES NFR BLD AUTO: 7.7 % (ref 5–12)
NEUTROPHILS NFR BLD AUTO: 6.34 10*3/MM3 (ref 1.7–7)
NEUTROPHILS NFR BLD AUTO: 72.7 % (ref 42.7–76)
NRBC BLD AUTO-RTO: 0 /100 WBC (ref 0–0.2)
PLATELET # BLD AUTO: 328 10*3/MM3 (ref 140–450)
PMV BLD AUTO: 8.5 FL (ref 6–12)
POTASSIUM SERPL-SCNC: 4.5 MMOL/L (ref 3.5–5.2)
PROT SERPL-MCNC: 7 G/DL (ref 6–8.5)
RBC # BLD AUTO: 4.18 10*6/MM3 (ref 4.14–5.8)
SODIUM SERPL-SCNC: 134 MMOL/L (ref 136–145)
T4 FREE SERPL-MCNC: 1.11 NG/DL (ref 0.93–1.7)
TSH SERPL DL<=0.05 MIU/L-ACNC: 3.87 UIU/ML (ref 0.27–4.2)
WBC NRBC COR # BLD: 8.72 10*3/MM3 (ref 3.4–10.8)

## 2023-03-31 PROCEDURE — 96413 CHEMO IV INFUSION 1 HR: CPT

## 2023-03-31 PROCEDURE — 36415 COLL VENOUS BLD VENIPUNCTURE: CPT

## 2023-03-31 PROCEDURE — 25010000002 NIVOLUMAB 240 MG/24ML SOLUTION 24 ML VIAL: Performed by: INTERNAL MEDICINE

## 2023-03-31 PROCEDURE — 84443 ASSAY THYROID STIM HORMONE: CPT

## 2023-03-31 PROCEDURE — 84439 ASSAY OF FREE THYROXINE: CPT

## 2023-03-31 PROCEDURE — 80053 COMPREHEN METABOLIC PANEL: CPT

## 2023-03-31 PROCEDURE — 85025 COMPLETE CBC W/AUTO DIFF WBC: CPT

## 2023-03-31 RX ORDER — SODIUM CHLORIDE 9 MG/ML
250 INJECTION, SOLUTION INTRAVENOUS ONCE
Status: DISCONTINUED | OUTPATIENT
Start: 2023-03-31 | End: 2023-03-31 | Stop reason: HOSPADM

## 2023-03-31 RX ADMIN — SODIUM CHLORIDE 480 MG: 9 INJECTION, SOLUTION INTRAVENOUS at 11:32

## 2023-04-12 ENCOUNTER — HOSPITAL ENCOUNTER (OUTPATIENT)
Dept: PHYSICAL THERAPY | Facility: HOSPITAL | Age: 68
Setting detail: THERAPIES SERIES
Discharge: HOME | End: 2023-04-12
Payer: MEDICARE

## 2023-04-12 DIAGNOSIS — C67.4 MALIGNANT NEOPLASM OF POSTERIOR WALL OF URINARY BLADDER: Primary | ICD-10-CM

## 2023-04-12 DIAGNOSIS — I89.0 LYMPHEDEMA OF RIGHT LOWER EXTREMITY: ICD-10-CM

## 2023-04-12 PROCEDURE — 97140 MANUAL THERAPY 1/> REGIONS: CPT

## 2023-04-12 NOTE — THERAPY PROGRESS REPORT/RE-CERT
Physical Therapy Lymphedema Progress Note  [facility]     Patient Name: Tru Buitrago  : 1955  MRN: [id number]  Today's Date: 2023      Visit Date: 2023    Visit Dx:    ICD-10-CM ICD-9-CM   1. Malignant neoplasm of posterior wall of urinary bladder  C67.4 188.4   2. Lymphedema of right lower extremity  I89.0 457.1       Patient Active Problem List   Diagnosis   • Malignant neoplasm of posterior wall of urinary bladder   • COPD (chronic obstructive pulmonary disease)   • GERD (gastroesophageal reflux disease)   • HTN (hypertension)   • HLD (hyperlipidemia)   • Smoker   • Renal insufficiency   • S/P cysto-prostatectomy with ileal conduit    • Acute postoperative pain   • Leukocytosis, mild, likely reactive   • Acute blood loss anemia, asymptomatic   • Postoperative nausea   • Anemia        Past Medical History:   Diagnosis Date   • Bladder cancer    • COPD (chronic obstructive pulmonary disease)    • GERD (gastroesophageal reflux disease)    • Hyperlipidemia    • Hypertension    • Postoperative nausea 8/3/2022        Past Surgical History:   Procedure Laterality Date   • COLONOSCOPY         • CYSTECTOMY N/A 2022    Procedure: CYSTO-PROSTATECTOMY, PELVIC LYMPH NODE DISSECTION LAPAROSCOPIC WITH DAVINCI ROBOT, CREATION OF ILEAL CONDUIT;  Surgeon: Godfrey Lawrence Jr., MD;  Location: [facility];  Service: Robotics - DaVinci;  Laterality: N/A;   • CYSTOSCOPY BLADDER BIOPSY         Visit Dx:    ICD-10-CM ICD-9-CM   1. Malignant neoplasm of posterior wall of urinary bladder  C67.4 188.4   2. Lymphedema of right lower extremity  I89.0 457.1            Lymphedema     Row Name 23 1300             Subjective Pain    Able to rate subjective pain? yes  -LF      Pre-Treatment Pain Level 6  -LF      Post-Treatment Pain Level 6  -LF         Subjective Comments    Subjective Comments Swelling better in the morning and gets worse as the day goes on  -LF         Lymphedema Edema  Assessment    Ptting Edema Category By severity  -LF      Pitting Edema Moderate  -LF      Edema Assessment Comment firm pitting edema lower leg, anterior thigh soft but moderate medial and lateral,.  and soft-pitting at foot.  -LF         Skin Changes/Observations    Location/Assessment Lower Extremity  -LF      Lower Extremity Conditions right:;intact;clean  -LF      Lower Extremity Color/Pigment right:;fibrosis  -LF         Lymphedema Measurements    Measurement Type(s) Quick Girth  -LF      Quick Girth Areas Lower extremities  -LF         RLE Quick Girth (cm)    Met-heads 27.5 cm  -LF      Mid foot 28.3 cm  -LF      Smallest ankle 30.4 cm  -LF      Largest calf 52 cm  -LF      Tib tuberosity 49.7 cm  -LF      Mid patella 55.3 cm  -LF      Distal thigh 60 cm  -LF      Proximal thigh 73.9 cm  -LF      Other 1 36 cm  10cm. prox. ankle  -LF      Other 2 68.4 cm  10cm.prox. DT  -LF      RLE Quick Girth Total 481.5  -LF         Manual Lymphatic Drainage    Manual Lymphatic Drainage extremity treatment;opened regional lymph nodes;opened anastamoses  -LF      Initial Sequence supraclavicular;shoulder collectors  -LF      Supraclavicular right;left  -LF      Shoulder Collectors right;left  -LF      Opened Regional Lymph Nodes inguinal  -LF      Axillary --  -LF      Inguinal right  -LF      Opened Anastamoses --  -LF      Inguino-Axillary --  -LF      Extremity Treatment MLD to full limb  -LF      MLD to Full Limb RLE, firm static holds to help soften and decongest  -LF         Compression/Skin Care    Compression/Skin Care bandaging;compression garment  -LF      Bandage Layers cotton elastic stocking- single layer (comment size);cotton elastic stocking- double layer (comment size)  -LF      Bandaging Comments Compressogrip:  size 4 MTH to distal calf and doubled over forefoot, size 5 ankle to knee, size 10 knee to groin.  Issued gray foam to use intermittently medial thigh, and distal lateral thigh/knee  -LF       Compression Garment Comments Reapplied lower leg velcro garment for mild compression  -LF            User Key  (r) = Recorded By, (t) = Taken By, (c) = Cosigned By    Initials Name Provider Type    LF Yaneth Marcos, PT Physical Therapist                 OP Exercises     Row Name 04/12/23 1300             Subjective Comments    Subjective Comments Swelling better in the morning and gets worse as the day goes on  -LF         Subjective Pain    Able to rate subjective pain? yes  -LF      Pre-Treatment Pain Level 6  -LF      Post-Treatment Pain Level 6  -LF         Total Minutes    19554 - PT Manual Therapy Minutes 55  -LF            User Key  (r) = Recorded By, (t) = Taken By, (c) = Cosigned By    Initials Name Provider Type    LF Yaneth Marcos PT Physical Therapist                Manual Rx (last 36 hours)     Manual Treatments     Row Name 04/12/23 1300             Total Minutes    49378 - PT Manual Therapy Minutes 55  -LF            User Key  (r) = Recorded By, (t) = Taken By, (c) = Cosigned By    Initials Name Provider Type    LF Yaneth Marcos, PT Physical Therapist                 PT OP Goals     Row Name 04/12/23 1300          PT Short Term Goals    STG Date to Achieve 04/14/23  -LF     STG 1 Patient independent with simple compression to promote edema reduction  -LF     STG 1 Progress Met  -LF     STG 2 Rigth knee and thigh measurements decreased by at least 3cm. each to demonstrate symptom reduction  -LF     STG 2 Progress Partially Met;Ongoing  -LF     STG 2 Progress Comments proximal thigh measurement  -LF     STG 3 Patient able to get in/out of their car without difficulty  -LF     STG 3 Progress Ongoing  -LF     STG 4 LEFS score improved by 15 points.  -LF     STG 4 Progress Ongoing  -LF     STG 5 Right leg pain improved by 50% to allow for improved ability for daily activities including to walk household distances  -LF     STG 5 Progress Ongoing  -LF        Long Term Goals    LTG Date to  Achieve 04/28/23  -LF     LTG 1 Pt to be measured and fit with compression garment/system for long term self management of lymphedema  -LF     LTG 1 Progress Partially Met;Ongoing  -LF     LTG 2 Patient independent with self-management of RLE lymphedema to include skin care, self-MLD, compression, and exercise  -LF     LTG 2 Progress Progressing;Ongoing  -LF     LTG 3 LE total circumferential measurements decreased by at least 40 cm. to promote decrease pain, improved ROM, and decrease risk for infection  -LF     LTG 3 Progress Progressing;Ongoing  -LF     LTG 3 Progress Comments 16cm  -LF     LTG 4 Patient able to navigate flight of stairs with mild to no difficulty  -LF     LTG 4 Progress Progressing;Ongoing  -LF           User Key  (r) = Recorded By, (t) = Taken By, (c) = Cosigned By    Initials Name Provider Type    LF Yaneth Marcos, PT Physical Therapist                 PT Assessment/Plan     Row Name 04/12/23 1300          PT Assessment    Functional Limitations Impaired gait;Limitation in home management;Limitations in community activities;Performance in leisure activities;Performance in self-care ADL;Other (comment)  lymphedema management  -LF     Impairments Edema;Gait;Impaired flexibility;Impaired lymphatic circulation;Pain;Range of motion  -LF     Assessment Comments Some improvement in measurements and thigh is less firm.  tolerating compression well.  Currently on anti-coagulation.  Anticipate continued improvement with time.  -LF     Please refer to paper survey for additional self-reported information Yes  -LF     Rehab Potential Good  -LF     Patient/caregiver participated in establishment of treatment plan and goals Yes  -LF     Patient would benefit from skilled therapy intervention Yes  -LF        PT Plan    PT Frequency 2x/week;1x/week  -LF     Planned CPT's? PT EVAL MOD COMPLELITY: 80856;PT THER PROC EA 15 MIN: 90424;PT THER ACT EA 15 MIN: 25895;PT MANUAL THERAPY EA 15 MIN: 22072;PT SELF  CARE/HOME MGMT/TRAIN EA 15: 05852  -     PT Plan Comments cont. per POC  -LF           User Key  (r) = Recorded By, (t) = Taken By, (c) = Cosigned By    Initials Name Provider Type     Yaneth Marcos, PT Physical Therapist                 Time Calculation:   Start Time: 1300  Timed Charges  31345 - PT Manual Therapy Minutes: 55  Total Minutes  Timed Charges Total Minutes: 55   Total Minutes: 55   Therapy Charges for Today     Code Description Service Date Service Provider Modifiers Qty    [id number]  PT MANUAL THERAPY EA 15 MIN 4/12/2023 Yaneth Marcos, PT GP 4                    Yaneth Marcos PT  4/12/2023

## 2023-04-21 ENCOUNTER — HOSPITAL ENCOUNTER (OUTPATIENT)
Dept: PET IMAGING | Facility: HOSPITAL | Age: 68
Discharge: HOME | End: 2023-04-21
Payer: MEDICARE

## 2023-04-21 ENCOUNTER — HOSPITAL ENCOUNTER (OUTPATIENT)
Dept: PHYSICAL THERAPY | Facility: HOSPITAL | Age: 68
Setting detail: THERAPIES SERIES
Discharge: HOME | End: 2023-04-21
Payer: MEDICARE

## 2023-04-21 DIAGNOSIS — C67.4 MALIGNANT NEOPLASM OF POSTERIOR WALL OF URINARY BLADDER: Primary | ICD-10-CM

## 2023-04-21 DIAGNOSIS — C67.4 MALIGNANT NEOPLASM OF POSTERIOR WALL OF URINARY BLADDER: ICD-10-CM

## 2023-04-21 DIAGNOSIS — I89.0 LYMPHEDEMA OF RIGHT LOWER EXTREMITY: ICD-10-CM

## 2023-04-21 LAB — GLUCOSE BLDC GLUCOMTR-MCNC: 104 MG/DL (ref 70–130)

## 2023-04-21 PROCEDURE — 82962 GLUCOSE BLOOD TEST: CPT

## 2023-04-21 PROCEDURE — 78815 PET IMAGE W/CT SKULL-THIGH: CPT

## 2023-04-21 PROCEDURE — A9552 F18 FDG: HCPCS | Performed by: INTERNAL MEDICINE

## 2023-04-21 PROCEDURE — 97140 MANUAL THERAPY 1/> REGIONS: CPT

## 2023-04-21 PROCEDURE — 0 FLUDEOXYGLUCOSE F18 SOLUTION: Performed by: INTERNAL MEDICINE

## 2023-04-21 RX ADMIN — FLUDEOXYGLUCOSE F18 1 DOSE: 300 INJECTION INTRAVENOUS at 14:10

## 2023-04-21 NOTE — THERAPY TREATMENT NOTE
Outpatient Physical Therapy Lymphedema Treatment Note  [facility]     Patient Name: Tru Buitrago  : 1955  MRN: [id number]  Today's Date: 2023        Visit Date: 2023    Visit Dx:    ICD-10-CM ICD-9-CM   1. Malignant neoplasm of posterior wall of urinary bladder  C67.4 188.4   2. Lymphedema of right lower extremity  I89.0 457.1       Patient Active Problem List   Diagnosis   • Malignant neoplasm of posterior wall of urinary bladder   • COPD (chronic obstructive pulmonary disease)   • GERD (gastroesophageal reflux disease)   • HTN (hypertension)   • HLD (hyperlipidemia)   • Smoker   • Renal insufficiency   • S/P cysto-prostatectomy with ileal conduit    • Acute postoperative pain   • Leukocytosis, mild, likely reactive   • Acute blood loss anemia, asymptomatic   • Postoperative nausea   • Anemia         Lymphedema     Row Name 23 1200             Subjective Pain    Able to rate subjective pain? yes  -LF      Pre-Treatment Pain Level 5  -LF      Post-Treatment Pain Level 5  -LF         Subjective Comments    Subjective Comments Reports he received compression pump and has been using daily all week.  Reports tissue softer and leg shape more normal after use, but swelling gradually returns if he does not reapply compression  -LF         Lymphedema Edema Assessment    Ptting Edema Category By severity  -LF      Pitting Edema Moderate  -LF      Edema Assessment Comment moderate firm edema lower leg, mild-moderate at thigh (less as you get more proximal) but with some pockets moderate edema.  -LF         Skin Changes/Observations    Location/Assessment Lower Extremity  -LF      Lower Extremity Conditions right:;intact;clean  -LF      Lower Extremity Color/Pigment right:;fibrosis  -LF         Lymphedema Measurements    Measurement Type(s) Quick Girth  -LF      Quick Girth Areas Lower extremities  -LF         RLE Quick Girth (cm)    Met-heads 27.5 cm  -LF      Mid foot 27.9 cm  -LF       Smallest ankle 29.9 cm  -LF      Largest calf 51.6 cm  -LF      Tib tuberosity 47.3 cm  -LF      Mid patella 56.5 cm  -LF      Distal thigh 66 cm  -LF      Proximal thigh 70.8 cm  -LF      Other 1 35.8 cm  -LF      Other 2 64.5 cm  10cm. prox. DT  -LF      RLE Quick Girth Total 477.8  -LF         Manual Lymphatic Drainage    Manual Lymphatic Drainage extremity treatment;opened regional lymph nodes;opened anastamoses  -LF      Initial Sequence supraclavicular;shoulder collectors  -LF      Supraclavicular right;left  -LF      Shoulder Collectors right;left  -LF      Opened Regional Lymph Nodes inguinal  -LF      Axillary right  -LF      Inguinal right  -LF      Extremity Treatment MLD to full limb  -LF      MLD to Full Limb RLE, intermittent firm static holds to help soften and decongest  -LF         Compression/Skin Care    Compression/Skin Care bandaging  -LF      Bandage Layers cotton elastic stocking- single layer (comment size);cotton elastic stocking- double layer (comment size)  -LF      Bandaging Comments Compressogrip:  size 4 MTH to distal calf, size 5 ankle to knee, size 10 knee to groin.  Cast padding and size 10 short-stretch bandage for moderate compression MTH to knee.  -LF            User Key  (r) = Recorded By, (t) = Taken By, (c) = Cosigned By    Initials Name Provider Type     Yaneth Marcos PT Physical Therapist                     PT Assessment/Plan     Row Name 04/21/23 1200          PT Assessment    Assessment Comments Decrease in measurements today.  Anticipate gradual improvement in symptoms with continued use of pump and consistent use of compression garments.  Used short-stretch bandage lower leg today as he did not have velcro garment with him  -        PT Plan    PT Plan Comments cont. per POC  -LF           User Key  (r) = Recorded By, (t) = Taken By, (c) = Cosigned By    Initials Name Provider Type    Yaneth Hills PT Physical Therapist                    OP Exercises      Row Name 04/21/23 1200             Subjective Comments    Subjective Comments Reports he received compression pump and has been using daily all week.  Reports tissue softer and leg shape more normal after use, but swelling gradually returns if he does not reapply compression  -LF         Subjective Pain    Able to rate subjective pain? yes  -LF      Pre-Treatment Pain Level 5  -LF      Post-Treatment Pain Level 5  -LF         Total Minutes    20911 - PT Manual Therapy Minutes 55  -LF            User Key  (r) = Recorded By, (t) = Taken By, (c) = Cosigned By    Initials Name Provider Type    Yaneth Hills PT Physical Therapist                Manual Rx (last 36 hours)     Manual Treatments     Row Name 04/21/23 1200             Total Minutes    26333 - PT Manual Therapy Minutes 55  -LF            User Key  (r) = Recorded By, (t) = Taken By, (c) = Cosigned By    Initials Name Provider Type    Yaneth Hills PT Physical Therapist                    Therapy Education  Education Details: Reinforced consistent use of compression to help reduce/maintain swelling reduction  Given: HEP, Symptoms/condition management, Edema management  Program: Reinforced  How Provided: Verbal  Provided to: Patient  Level of Understanding: Verbalized        Time Calculation:   Start Time: 1200  Timed Charges  57443 - PT Manual Therapy Minutes: 55  Total Minutes  Timed Charges Total Minutes: 55   Total Minutes: 55   Therapy Charges for Today     Code Description Service Date Service Provider Modifiers Qty    [id number]  PT MANUAL THERAPY EA 15 MIN 4/21/2023 Yaneth Marcos, PT GP 4                    Yaneth Marcos PT  4/21/2023

## 2023-04-26 ENCOUNTER — HOSPITAL ENCOUNTER (OUTPATIENT)
Dept: PHYSICAL THERAPY | Facility: HOSPITAL | Age: 68
Setting detail: THERAPIES SERIES
Discharge: HOME | End: 2023-04-26
Payer: MEDICARE

## 2023-04-26 DIAGNOSIS — I89.0 LYMPHEDEMA OF RIGHT LOWER EXTREMITY: ICD-10-CM

## 2023-04-26 DIAGNOSIS — C67.4 MALIGNANT NEOPLASM OF POSTERIOR WALL OF URINARY BLADDER: Primary | ICD-10-CM

## 2023-04-26 PROCEDURE — 97140 MANUAL THERAPY 1/> REGIONS: CPT

## 2023-04-26 NOTE — THERAPY TREATMENT NOTE
Outpatient Physical Therapy Lymphedema Treatment Note  [facility]     Patient Name: Tru Buitrago  : 1955  MRN: [id number]  Today's Date: 2023        Visit Date: 2023    Visit Dx:    ICD-10-CM ICD-9-CM   1. Malignant neoplasm of posterior wall of urinary bladder  C67.4 188.4   2. Lymphedema of right lower extremity  I89.0 457.1       Patient Active Problem List   Diagnosis   • Malignant neoplasm of posterior wall of urinary bladder   • COPD (chronic obstructive pulmonary disease)   • GERD (gastroesophageal reflux disease)   • HTN (hypertension)   • HLD (hyperlipidemia)   • Smoker   • Renal insufficiency   • S/P cysto-prostatectomy with ileal conduit    • Acute postoperative pain   • Leukocytosis, mild, likely reactive   • Acute blood loss anemia, asymptomatic   • Postoperative nausea   • Anemia         Lymphedema     Row Name 23 1300             Subjective Pain    Able to rate subjective pain? yes  -LF      Pre-Treatment Pain Level 5  -LF      Post-Treatment Pain Level 5  -LF         Subjective Comments    Subjective Comments Reports improved ROM, ability to get around, in/out of car, lift leg  -LF         Lymphedema Edema Assessment    Ptting Edema Category By severity  -LF      Pitting Edema Moderate  -LF      Edema Assessment Comment less firm edema  -LF         Skin Changes/Observations    Location/Assessment Lower Extremity  -LF      Lower Extremity Conditions right:;intact;clean  -LF      Lower Extremity Color/Pigment right:;fibrosis  -LF         Lymphedema Measurements    Measurement Type(s) Quick Girth  -LF      Quick Girth Areas Lower extremities  -LF         RLE Quick Girth (cm)    Mid foot 26.5 cm  -LF      Smallest ankle 28.5 cm  -LF      Largest calf 49.3 cm  -LF      Mid patella 52 cm  -LF      Distal thigh 58 cm  -LF      Proximal thigh 69.4 cm  -LF      Other 2 64.7 cm  -LF      RLE Quick Girth Total 348.4  -LF         Manual Lymphatic Drainage    Manual  Lymphatic Drainage extremity treatment;opened regional lymph nodes;opened anastamoses  -LF      Initial Sequence supraclavicular;shoulder collectors  -LF      Supraclavicular right;left  -LF      Shoulder Collectors right;left  -LF      Opened Regional Lymph Nodes inguinal  -LF      Axillary right  -LF      Inguinal right  -LF      Extremity Treatment MLD to full limb  -LF      MLD to Full Limb RLE, intermittent firm static holds to help soften and decongest  -LF         Compression/Skin Care    Compression/Skin Care bandaging;compression garment  -LF      Bandage Layers cotton elastic stocking- single layer (comment size);cotton elastic stocking- double layer (comment size)  -LF      Bandaging Comments compressogrip size 8 to thigh  -LF      Compression Garment Comments Issued size large velcro garment.  Donned liner socks before applying velcro system  -LF            User Key  (r) = Recorded By, (t) = Taken By, (c) = Cosigned By    Initials Name Provider Type     Yaneth Marcos, PT Physical Therapist                   PT Assessment/Plan     Row Name 04/26/23 1300          PT Assessment    Assessment Comments Steady improvement in LE swelling, and reports improved function. Plan to continue with home program with compression pump and and compression garments. Will follow-up 3-4 weeks, or sooner if needed  -        PT Plan    PT Plan Comments follow-up 3-4 weeks  -           User Key  (r) = Recorded By, (t) = Taken By, (c) = Cosigned By    Initials Name Provider Type     Yaneth Marcos, PT Physical Therapist                    OP Exercises     Row Name 04/26/23 1300             Subjective Comments    Subjective Comments Reports improved ROM, ability to get around, in/out of car, lift leg  -LF         Subjective Pain    Able to rate subjective pain? yes  -LF      Pre-Treatment Pain Level 5  -LF      Post-Treatment Pain Level 5  -LF         Total Minutes    50186 - PT Manual Therapy Minutes 54  -LF             User Key  (r) = Recorded By, (t) = Taken By, (c) = Cosigned By    Initials Name Provider Type    LF Yaneth Marcos, PT Physical Therapist                Manual Rx (last 36 hours)     Manual Treatments     Row Name 04/26/23 1300             Total Minutes    42452 - PT Manual Therapy Minutes 54  -LF            User Key  (r) = Recorded By, (t) = Taken By, (c) = Cosigned By    Initials Name Provider Type     Yaneth Marcos PT Physical Therapist                  Time Calculation:   Start Time: 1300  Timed Charges  65053 - PT Manual Therapy Minutes: 54  Total Minutes  Timed Charges Total Minutes: 54   Total Minutes: 54   Therapy Charges for Today     Code Description Service Date Service Provider Modifiers Qty    [id number]  PT MANUAL THERAPY EA 15 MIN 4/26/2023 Yaneth Marcos, PT GP 4                    Yaneth Marcos PT  4/26/2023

## 2023-05-05 ENCOUNTER — OFFICE VISIT (OUTPATIENT)
Dept: ONCOLOGY | Facility: CLINIC | Age: 68
End: 2023-05-05
Payer: MEDICARE

## 2023-05-05 ENCOUNTER — INFUSION (OUTPATIENT)
Dept: ONCOLOGY | Facility: HOSPITAL | Age: 68
End: 2023-05-05
Payer: MEDICARE

## 2023-05-05 VITALS
DIASTOLIC BLOOD PRESSURE: 68 MMHG | SYSTOLIC BLOOD PRESSURE: 130 MMHG | WEIGHT: 268 LBS | OXYGEN SATURATION: 99 % | TEMPERATURE: 97.5 F | RESPIRATION RATE: 16 BRPM | HEART RATE: 82 BPM | BODY MASS INDEX: 36.3 KG/M2 | HEIGHT: 72 IN

## 2023-05-05 DIAGNOSIS — C67.4 MALIGNANT NEOPLASM OF POSTERIOR WALL OF URINARY BLADDER: Primary | ICD-10-CM

## 2023-05-05 LAB
ALBUMIN SERPL-MCNC: 4.1 G/DL (ref 3.5–5.2)
ALBUMIN/GLOB SERPL: 1.4 G/DL
ALP SERPL-CCNC: 85 U/L (ref 39–117)
ALT SERPL W P-5'-P-CCNC: 11 U/L (ref 1–41)
ANION GAP SERPL CALCULATED.3IONS-SCNC: 12.3 MMOL/L (ref 5–15)
AST SERPL-CCNC: 14 U/L (ref 1–40)
BASOPHILS # BLD AUTO: 0.07 10*3/MM3 (ref 0–0.2)
BASOPHILS NFR BLD AUTO: 1 % (ref 0–1.5)
BILIRUB SERPL-MCNC: 0.2 MG/DL (ref 0–1.2)
BUN SERPL-MCNC: 14 MG/DL (ref 8–23)
BUN/CREAT SERPL: 9 (ref 7–25)
CALCIUM SPEC-SCNC: 9.3 MG/DL (ref 8.6–10.5)
CHLORIDE SERPL-SCNC: 104 MMOL/L (ref 98–107)
CO2 SERPL-SCNC: 20.7 MMOL/L (ref 22–29)
CREAT SERPL-MCNC: 1.56 MG/DL (ref 0.76–1.27)
DEPRECATED RDW RBC AUTO: 47.2 FL (ref 37–54)
EGFRCR SERPLBLD CKD-EPI 2021: 49 ML/MIN/1.73
EOSINOPHIL # BLD AUTO: 0.26 10*3/MM3 (ref 0–0.4)
EOSINOPHIL NFR BLD AUTO: 3.6 % (ref 0.3–6.2)
ERYTHROCYTE [DISTWIDTH] IN BLOOD BY AUTOMATED COUNT: 15.6 % (ref 12.3–15.4)
GLOBULIN UR ELPH-MCNC: 2.9 GM/DL
GLUCOSE SERPL-MCNC: 111 MG/DL (ref 65–99)
HCT VFR BLD AUTO: 36.7 % (ref 37.5–51)
HGB BLD-MCNC: 11.8 G/DL (ref 13–17.7)
IMM GRANULOCYTES # BLD AUTO: 0.02 10*3/MM3 (ref 0–0.05)
IMM GRANULOCYTES NFR BLD AUTO: 0.3 % (ref 0–0.5)
LYMPHOCYTES # BLD AUTO: 1.26 10*3/MM3 (ref 0.7–3.1)
LYMPHOCYTES NFR BLD AUTO: 17.6 % (ref 19.6–45.3)
MCH RBC QN AUTO: 26.6 PG (ref 26.6–33)
MCHC RBC AUTO-ENTMCNC: 32.2 G/DL (ref 31.5–35.7)
MCV RBC AUTO: 82.8 FL (ref 79–97)
MONOCYTES # BLD AUTO: 0.47 10*3/MM3 (ref 0.1–0.9)
MONOCYTES NFR BLD AUTO: 6.6 % (ref 5–12)
NEUTROPHILS NFR BLD AUTO: 5.06 10*3/MM3 (ref 1.7–7)
NEUTROPHILS NFR BLD AUTO: 70.9 % (ref 42.7–76)
NRBC BLD AUTO-RTO: 0 /100 WBC (ref 0–0.2)
PLATELET # BLD AUTO: 260 10*3/MM3 (ref 140–450)
PMV BLD AUTO: 9.2 FL (ref 6–12)
POTASSIUM SERPL-SCNC: 4.4 MMOL/L (ref 3.5–5.2)
PROT SERPL-MCNC: 7 G/DL (ref 6–8.5)
RBC # BLD AUTO: 4.43 10*6/MM3 (ref 4.14–5.8)
SODIUM SERPL-SCNC: 137 MMOL/L (ref 136–145)
T4 FREE SERPL-MCNC: 1.07 NG/DL (ref 0.93–1.7)
TSH SERPL DL<=0.05 MIU/L-ACNC: 2.73 UIU/ML (ref 0.27–4.2)
WBC NRBC COR # BLD: 7.14 10*3/MM3 (ref 3.4–10.8)

## 2023-05-05 PROCEDURE — 84443 ASSAY THYROID STIM HORMONE: CPT

## 2023-05-05 PROCEDURE — 3078F DIAST BP <80 MM HG: CPT | Performed by: INTERNAL MEDICINE

## 2023-05-05 PROCEDURE — 3075F SYST BP GE 130 - 139MM HG: CPT | Performed by: INTERNAL MEDICINE

## 2023-05-05 PROCEDURE — 99214 OFFICE O/P EST MOD 30 MIN: CPT | Performed by: INTERNAL MEDICINE

## 2023-05-05 PROCEDURE — 96413 CHEMO IV INFUSION 1 HR: CPT

## 2023-05-05 PROCEDURE — 25010000002 NIVOLUMAB 240 MG/24ML SOLUTION 24 ML VIAL: Performed by: INTERNAL MEDICINE

## 2023-05-05 PROCEDURE — 84439 ASSAY OF FREE THYROXINE: CPT

## 2023-05-05 PROCEDURE — 1125F AMNT PAIN NOTED PAIN PRSNT: CPT | Performed by: INTERNAL MEDICINE

## 2023-05-05 PROCEDURE — 80053 COMPREHEN METABOLIC PANEL: CPT

## 2023-05-05 PROCEDURE — 36415 COLL VENOUS BLD VENIPUNCTURE: CPT

## 2023-05-05 PROCEDURE — 85025 COMPLETE CBC W/AUTO DIFF WBC: CPT

## 2023-05-05 RX ORDER — SODIUM CHLORIDE 9 MG/ML
250 INJECTION, SOLUTION INTRAVENOUS ONCE
OUTPATIENT
Start: 2023-07-28

## 2023-05-05 RX ORDER — SODIUM CHLORIDE 9 MG/ML
250 INJECTION, SOLUTION INTRAVENOUS ONCE
OUTPATIENT
Start: 2023-06-30

## 2023-05-05 RX ORDER — PROMETHAZINE HYDROCHLORIDE 25 MG/1
TABLET ORAL
COMMUNITY
Start: 2023-03-29

## 2023-05-05 RX ORDER — SODIUM CHLORIDE 9 MG/ML
250 INJECTION, SOLUTION INTRAVENOUS ONCE
OUTPATIENT
Start: 2023-06-02

## 2023-05-05 RX ORDER — ESOMEPRAZOLE MAGNESIUM 40 MG/1
CAPSULE, DELAYED RELEASE ORAL
COMMUNITY
Start: 2023-04-11

## 2023-05-05 RX ORDER — SODIUM CHLORIDE 9 MG/ML
250 INJECTION, SOLUTION INTRAVENOUS ONCE
Status: DISCONTINUED | OUTPATIENT
Start: 2023-05-05 | End: 2023-05-05 | Stop reason: HOSPADM

## 2023-05-05 RX ADMIN — SODIUM CHLORIDE 480 MG: 9 INJECTION, SOLUTION INTRAVENOUS at 12:33

## 2023-05-05 NOTE — PROGRESS NOTES
PROBLEM LIST:  Oncology/Hematology History   Malignant neoplasm of posterior wall of urinary bladder   6/2/2022 Cancer Staged    Staging form: Urinary Bladder, AJCC 8th Edition  - Clinical stage from 6/2/2022: Stage II (cT2, cN0, cM0) - Signed by Kev Cobb MD on 6/29/2022 6/29/2022 Initial Diagnosis    Malignant neoplasm of posterior wall of urinary bladder (HCC)     8/2/2022 Cancer Staged    Staging form: Urinary Bladder, AJCC 8th Edition  - Pathologic stage from 8/2/2022: Stage IIIA (pT3b, pN0, cM0) - Signed by Beverly Valladares MD on 8/25/2022 9/9/2022 -  Chemotherapy    OP BLADDER Nivolumab 480 mg         REASON FOR VISIT: Management of my bladder cancer    HISTORY OF PRESENT ILLNESS:   67 y.o.  male presents today for follow-up of his bladder cancer.  He is currently on adjuvant nivolumab.  He is doing well clinically.  He is wanting to have right knee surgery done.  His right leg also has significant lymphedema.  Denies any significant issues with pain.  No issues with nausea vomiting.  No issues with headaches.  Had a PET scan recently.    Past medical history, social history and family history was reviewed 05/05/23 and unchanged from prior visit.    Review of Systems:    Review of Systems   Constitutional: Negative.    HENT:  Negative.    Eyes: Negative.    Respiratory: Positive for shortness of breath.    Cardiovascular: Negative.    Gastrointestinal: Negative.    Endocrine: Negative.    Genitourinary: Negative.     Musculoskeletal: Negative.    Skin: Negative.    Neurological: Negative.    Hematological: Negative.    Psychiatric/Behavioral: Negative.             Medications:        Current Outpatient Medications:   •  albuterol sulfate  (90 Base) MCG/ACT inhaler, Inhale 2 puffs Every 4 (Four) Hours As Needed for Wheezing., Disp: , Rfl:   •  Anoro Ellipta 62.5-25 MCG/INH aerosol powder  inhaler, Inhale 1 puff Daily., Disp: , Rfl:   •  apixaban (ELIQUIS) 5 MG tablet  tablet, Take 1 tablet by mouth 2 (Two) Times a Day., Disp: 60 tablet, Rfl: 10  •  atorvastatin (LIPITOR) 20 MG tablet, Take 1 tablet by mouth Daily., Disp: , Rfl:   •  docusate sodium (Colace) 100 MG capsule, Take 1 capsule by mouth Daily As Needed for Constipation., Disp: 30 capsule, Rfl: 1  •  esomeprazole (nexIUM) 40 MG capsule, , Disp: , Rfl:   •  famotidine (PEPCID) 40 MG tablet, Take 1 tablet by mouth 3 (Three) Times a Day., Disp: , Rfl:   •  fexofenadine (ALLEGRA) 180 MG tablet, Take 1 tablet by mouth Daily., Disp: , Rfl:   •  gabapentin (NEURONTIN) 600 MG tablet, Take 1 tablet by mouth 3 (Three) Times a Day., Disp: , Rfl:   •  HYDROcodone-acetaminophen (NORCO) 7.5-325 MG per tablet, Take 1 tablet by mouth Every 6 (Six) Hours As Needed for Moderate Pain . (Patient taking differently: Take 1 tablet by mouth Every 6 (Six) Hours As Needed for Moderate Pain. Increased  to  mg TID), Disp: 30 tablet, Rfl: 0  •  losartan (COZAAR) 50 MG tablet, Take 1 tablet by mouth Daily., Disp: , Rfl:   •  montelukast (SINGULAIR) 10 MG tablet, Take 1 tablet by mouth Every Night., Disp: , Rfl:   •  multivitamin with minerals tablet tablet, Take 1 tablet by mouth Daily., Disp: , Rfl:   •  nicotine (NICODERM CQ) 14 MG/24HR patch, Place 1 patch on the skin as directed by provider Daily., Disp: , Rfl:   •  ondansetron (ZOFRAN) 8 MG tablet, Take 1 tablet by mouth 3 (Three) Times a Day As Needed for Nausea or Vomiting., Disp: 30 tablet, Rfl: 5  •  polyethylene glycol (MIRALAX) 17 g packet, Take 17 g by mouth Daily., Disp: 30 packet, Rfl: 0  •  promethazine (PHENERGAN) 25 MG tablet, , Disp: , Rfl:   •  tiZANidine (ZANAFLEX) 4 MG tablet, Take 1 tablet by mouth Every 8 (Eight) Hours As Needed., Disp: , Rfl:   •  verapamil ER (VERELAN) 240 MG 24 hr capsule, Take 1 capsule by mouth Every Night., Disp: , Rfl:     Pain Medications             gabapentin (NEURONTIN) 600 MG tablet Take 1 tablet by mouth 3 (Three) Times a Day.     "HYDROcodone-acetaminophen (NORCO) 7.5-325 MG per tablet Take 1 tablet by mouth Every 6 (Six) Hours As Needed for Moderate Pain .    tiZANidine (ZANAFLEX) 4 MG tablet Take 1 tablet by mouth Every 8 (Eight) Hours As Needed.             ALLERGIES:    Allergies   Allergen Reactions   • Fluticasone-Salmeterol Unknown - Low Severity     Thrush   • Spiriva Respimat [Tiotropium Bromide Monohydrate] Unknown - Low Severity     Thrush   • Tetanus Toxoids Swelling   • Food Color Orange [Yellow Dye] Nausea Only   • Naproxen Nausea Only   • Orange Flavor GI Intolerance         Physical Exam    VITAL SIGNS:  /68   Pulse 82   Temp 97.5 °F (36.4 °C) (Temporal)   Resp 16   Ht 182.9 cm (72\")   Wt 122 kg (268 lb)   SpO2 99%   BMI 36.35 kg/m²                 Wt Readings from Last 3 Encounters:   05/05/23 122 kg (268 lb)   03/31/23 127 kg (278 lb 14.4 oz)   03/03/23 127 kg (280 lb)       Body mass index is 36.35 kg/m². Body surface area is 2.42 meters squared.       Performance Status: 0    General: well appearing, in no acute distress  HEENT: sclera anicteric, neck is supple  Lymphatics: no cervical, supraclavicular, or axillary adenopathy  Cardiovascular: regular rate and rhythm, no murmurs, rubs or gallops  Lungs: clear to auscultation bilaterally  Abdomen: soft, nontender, nondistended.  No palpable organomegaly  Extremities: no lower extremity edema  Skin: no rashes, lesions, bruising, or petechiae  Msk:  Shows no weakness of the large muscle groups  Psych: Mood is stable        RECENT LABS:    Lab Results   Component Value Date    HGB 11.5 (L) 03/31/2023    HCT 35.0 (L) 03/31/2023    MCV 83.7 03/31/2023     03/31/2023    WBC 8.72 03/31/2023    NEUTROABS 6.34 03/31/2023    LYMPHSABS 1.24 03/31/2023    MONOSABS 0.67 03/31/2023    EOSABS 0.33 03/31/2023    BASOSABS 0.09 03/31/2023       Lab Results   Component Value Date    GLUCOSE 92 03/31/2023    BUN 14 03/31/2023    CREATININE 1.41 (H) 03/31/2023     (L) " 03/31/2023    K 4.5 03/31/2023     03/31/2023    CO2 24.1 03/31/2023    CALCIUM 9.3 03/31/2023    PROTEINTOT 7.0 03/31/2023    ALBUMIN 3.7 03/31/2023    BILITOT 0.3 03/31/2023    ALKPHOS 93 03/31/2023    AST 11 03/31/2023    ALT 9 03/31/2023     CT Chest With Contrast Diagnostic    Addendum Date: 2/3/2023    Addendum: Exam compared to previous PET CT of 09/20/2022. Intermediate attenuation suggesting right gynecomastia is stable from the prior exam. The nodule seen on prior exam in the right middle lobe and associated with bullous change is stable. Again noted is interstitial disease bilaterally. Previously there was airspace disease posteriorly in the right upper lobe which has resolved. Bilateral dependent interstitial edema is similar to the prior study. 2, subcentimeter right lower lobe nodules are stable from prior. Fissural nodule on the left is stable. Sclerotic area in the right T3 vertebral body appears similar to the prior exam and did not show any increased uptake on the PET CT, likely asymmetric degenerative change.  IMPRESSION: No significant change from previous PET CT.  This report was signed and finalized on 2/3/2023 4:19 PM by Massiel Jack MD.    Result Date: 2/3/2023  Several bilateral circumscribed noncalcified pulmonary nodules, many fissural and likely representing lymph nodes but no prior study plus a sclerotic lesion in the T3 vertebral body; recommend PET/CT to evaluate for metastases.  Emphysematous change.  Suspected right gynecomastia, recommend ultrasound.  This report was signed and finalized on 1/31/2023 4:01 PM by Massiel Jack MD.    CT Abdomen Pelvis With Contrast    Addendum Date: 2/3/2023    Addendum: Images compared to previous PET CT of 09/20/2022. Hypermetabolic activity was seen at the urostomy site; the appearance the urostomy site is stable from the prior exam. The abdominal wall hernia appears similar to the prior exam. The right extrarenal pelvis on the right is  stable. No IV contrast given with the PET/CT so cannot evaluate for enhancement of the wall the right renal pelvis. Right perirenal stranding does appears stable. Hypodense lesions in the left kidney are stable. Left extrarenal pelvis is slightly smaller compared to the prior exam. Bilateral hydroceles appears similar to the prior exam.  IMPRESSION: No significant change compared to the prior study.  This report was signed and finalized on 2/3/2023 4:10 PM by Massiel Jack MD.    Result Date: 2/3/2023  Right kidney smaller than the left with cortical thinning and mild hydronephrosis with enhancement of the walls of the right renal pelvis, consider pyelitis.  Extrarenal left pelvis and simple appearing renal cystic lesion, otherwise normal appearance to left kidney.  Postsurgical change from urinary diversion.  Midline abdominal wall hernia containing nonobstructed small bowel loops.  Bilateral hydroceles.  This report was signed and finalized on 1/31/2023 4:28 PM by Massiel Jack MD.    NM PET/CT Skull Base to Mid Thigh    Result Date: 4/21/2023  Impression: No evidence of hypermetabolic recurrent or metastatic disease. Postoperative changes of bladder resection with right lower quadrant ileal conduit and urostomy formation. Few scattered pulmonary nodules, similar to prior examination without focal FDG uptake. Recommend continued attention on follow-up. Increased subcutaneous edema and swelling of the right lower extremity, nonspecific. Electronically Signed: Milton Loza  4/21/2023 6:03 PM EDT  Workstation ID: [id number]          Assessment/Plan    1.  Stage IIIa high-grade urothelial carcinoma of the bladder s/p cystectomy.  I reviewed the PET scan with him and his wife today.  No sign of progressive disease.  We will plan to repeat scans in 4 months.  Still fairly high risk of having recurrence.  He completes adjuvant nivolumab in end of July.      2.  Severe lymphedema secondary to lymphadenectomy in the right  leg.  Seeing physical therapy .  He may consider lymph node transplant at some point if he is stable for years without any progression of his disease.  The only place that does it that is close by is [place] [facility].    Total time of patient care on day of service including time prior to, face to face with patient, and following visit spent in reviewing records, lab results, imaging studies, discussion with patient, and documentation/charting was > 321minutes.     Beverly Valladares MD  [facility]    Return on: 06/30/23  Return in (Approximately): 2 months, Schedule with next infusion    Orders Placed This Encounter   Procedures   • Comprehensive metabolic panel   • TSH   • T4, free   • Comprehensive metabolic panel   • TSH   • T4, free   • Comprehensive metabolic panel   • TSH   • T4, free   • CBC and Differential   • CBC and Differential   • CBC and Differential       5/5/2023

## 2023-05-17 ENCOUNTER — HOSPITAL ENCOUNTER (OUTPATIENT)
Dept: PHYSICAL THERAPY | Facility: HOSPITAL | Age: 68
Setting detail: THERAPIES SERIES
Discharge: HOME | End: 2023-05-17
Payer: MEDICARE

## 2023-05-17 DIAGNOSIS — C67.4 MALIGNANT NEOPLASM OF POSTERIOR WALL OF URINARY BLADDER: Primary | ICD-10-CM

## 2023-05-17 DIAGNOSIS — I89.0 LYMPHEDEMA OF RIGHT LOWER EXTREMITY: ICD-10-CM

## 2023-05-17 PROCEDURE — 97140 MANUAL THERAPY 1/> REGIONS: CPT

## 2023-05-17 NOTE — THERAPY PROGRESS REPORT/RE-CERT
Physical Therapy Lymphedema Progress Note  [facility]     Patient Name: Tru Buitrago  : 1955  MRN: [id number]  Today's Date: 2023      Visit Date: 2023    Visit Dx:    ICD-10-CM ICD-9-CM   1. Malignant neoplasm of posterior wall of urinary bladder  C67.4 188.4   2. Lymphedema of right lower extremity  I89.0 457.1       Patient Active Problem List   Diagnosis   • Malignant neoplasm of posterior wall of urinary bladder   • COPD (chronic obstructive pulmonary disease)   • GERD (gastroesophageal reflux disease)   • HTN (hypertension)   • HLD (hyperlipidemia)   • Smoker   • Renal insufficiency   • S/P cysto-prostatectomy with ileal conduit    • Acute postoperative pain   • Leukocytosis, mild, likely reactive   • Acute blood loss anemia, asymptomatic   • Postoperative nausea   • Anemia        Past Medical History:   Diagnosis Date   • Bladder cancer    • COPD (chronic obstructive pulmonary disease)    • GERD (gastroesophageal reflux disease)    • Hyperlipidemia    • Hypertension    • Postoperative nausea 8/3/2022        Past Surgical History:   Procedure Laterality Date   • COLONOSCOPY         • CYSTECTOMY N/A 2022    Procedure: CYSTO-PROSTATECTOMY, PELVIC LYMPH NODE DISSECTION LAPAROSCOPIC WITH inSparqINCI ROBOT, CREATION OF ILEAL CONDUIT;  Surgeon: Godfrey Lawrence Jr., MD;  Location: [facility];  Service: Robotics - DaVinci;  Laterality: N/A;   • CYSTOSCOPY BLADDER BIOPSY         Visit Dx:    ICD-10-CM ICD-9-CM   1. Malignant neoplasm of posterior wall of urinary bladder  C67.4 188.4   2. Lymphedema of right lower extremity  I89.0 457.1            Lymphedema     Row Name 23 1100             Subjective Comments    Subjective Comments Reports leg is a little more swollen today than it has been.  Didn't wear compression yesterday.  using pump 2x/day. concerned about wearing compression in the summer  -         Lymphedema Assessment    Lymphedema Classification  secondary;RLE:;stage 2 (Spontaneously Irreversible);Trunk:;stage 1 (Spontaneously Reversible)  -LF      Lymphedema Cancer Related Sx other (comment)  cysto-prostectomy with ileconduit  -LF      Lymph Nodes Removed # --  unspecified pelvic LND  -LF      Positive Lymph Nodes # 0  -LF      Chemo Received yes  -LF      Infections or Cellulitis? no  -LF      Lymphedema Precautions other (comment)  urostomy  -LF         Posture/Observations    Posture/Observations Comments ambulates with walking stick, mild antalgic gait, decreased knee flex  -LF         Lymphedema Edema Assessment    Ptting Edema Category By severity  -LF      Pitting Edema Moderate  -LF      Stemmer Sign right:;positive  -LF      Edema Assessment Comment less firm edema.  firmer areas include medial thigh, knee and lower leg  -LF         Skin Changes/Observations    Location/Assessment Lower Extremity  -LF      Lower Extremity Conditions right:;intact;clean  -LF      Lower Extremity Color/Pigment right:;fibrosis  mild  -LF         Lymphedema Pulses/Capillary Refill    Lymphedema Pulses/Capillary Refill lower extremity pulses;capillary refill  -LF      Dorsalis Pedis Pulse right:  unable to detect with swelling  -LF      Capillary Refill lower extremity capillary refill  -LF      Lower Extremity Capillary Refill right:;less than 3 seconds  -LF         Lymphedema Measurements    Measurement Type(s) Quick Girth  -LF      Quick Girth Areas Lower extremities  -LF         RLE Quick Girth (cm)    Met-heads 26.8 cm  -LF      Mid foot 26.5 cm  -LF      Smallest ankle 30 cm  -LF      Largest calf 48.7 cm  -LF      Tib tuberosity 46.2 cm  -LF      Mid patella 50.8 cm  -LF      Distal thigh 59 cm  -LF      Proximal thigh 71.3 cm  -LF      Other 1 36 cm  10cm. prox. Ankle  -LF      Other 2 63.5 cm  10cm. prox DT  -LF      RLE Quick Girth Total 458.8  -LF         Manual Lymphatic Drainage    Manual Lymphatic Drainage initial sequence;opened regional lymph  nodes;opened anastamoses;extremity treatment  -LF      Initial Sequence supraclavicular;shoulder collectors;diaphragmatic breathing;abdomen  -LF      Supraclavicular right;left  -LF      Shoulder Collectors right;left  -LF      Opened Regional Lymph Nodes inguinal;axillary  -LF      Axillary right  -LF      Inguinal right;left  -LF      Opened Anastamoses inguino-axillary  -LF      Inguino-Axillary right  -LF      Extremity Treatment MLD to full limb  -LF      MLD to Full Limb RLE  -LF         Compression/Skin Care    Compression/Skin Care compression garment  -LF      Compression Garment Comments Issued and donned 20-30mmHG OT knee high size IV sock for RLE, then layered velcro wrap on tap for added compression.  Educated on removing sock at night, but okay to use velcro  -LF            User Key  (r) = Recorded By, (t) = Taken By, (c) = Cosigned By    Initials Name Provider Type    LF Yaneth Marcos PT Physical Therapist                  Therapy Education  Education Details: Educated on night time garments including tribute wrap or circaid profile, as well as compression garments (Exostrong) that can be helpful in managing symptoms. Issued 20-30mmHg knee high and okay to use with velcro garment  Given: HEP, Symptoms/condition management, Edema management  Program: Reinforced, Modified  How Provided: Verbal, Demonstration, Written  Provided to: Patient, Caregiver  Level of Understanding: Verbalized       OP Exercises     Row Name 05/17/23 1100             Subjective Comments    Subjective Comments Reports leg is a little more swollen today than it has been.  Didn't wear compression yesterday.  using pump 2x/day. concerned about wearing compression in the summer  -LF         Total Minutes    76892 - PT Manual Therapy Minutes 65  -LF            User Key  (r) = Recorded By, (t) = Taken By, (c) = Cosigned By    Initials Name Provider Type    LF Yaneth Marcos PT Physical Therapist                Manual Rx (last  36 hours)     Manual Treatments     Row Name 05/17/23 1100             Total Minutes    30804 - PT Manual Therapy Minutes 65  -LF            User Key  (r) = Recorded By, (t) = Taken By, (c) = Cosigned By    Initials Name Provider Type    Yaneth Hills, PT Physical Therapist                 PT OP Goals     Row Name 05/17/23 1100          PT Short Term Goals    STG Date to Achieve 04/14/23  -LF     STG 1 Patient independent with simple compression to promote edema reduction  -LF     STG 1 Progress Met  -LF     STG 2 Rigth knee and thigh measurements decreased by at least 3cm. each to demonstrate symptom reduction  -LF     STG 2 Progress Partially Met;Ongoing  -LF     STG 3 Patient able to get in/out of their car without difficulty  -LF     STG 3 Progress Ongoing  -LF     STG 3 Progress Comments knee pain contributes  -LF     STG 4 LEFS score improved by 15 points.  -LF     STG 4 Progress Ongoing  -LF     STG 5 Right leg pain improved by 50% to allow for improved ability for daily activities including to walk household distances  -LF     STG 5 Progress Progressing;Ongoing  -LF        Long Term Goals    LTG Date to Achieve 04/28/23  -LF     LTG 1 Pt to be measured and fit with compression garment/system for long term self management of lymphedema  -LF     LTG 1 Progress Partially Met;Ongoing  -LF     LTG 1 Progress Comments has velcro wrap and 20-30mmHG knee high.  Info provided for night-time, and flat-knit readywear thigh high  -LF     LTG 2 Patient independent with self-management of RLE lymphedema to include skin care, self-MLD, compression, and exercise  -LF     LTG 2 Progress Progressing;Ongoing  -LF     LTG 3 LE total circumferential measurements decreased by at least 40 cm. to promote decrease pain, improved ROM, and decrease risk for infection  -LF     LTG 3 Progress Met  -LF     LTG 4 Patient able to navigate flight of stairs with mild to no difficulty  -LF     LTG 4 Progress Progressing;Ongoing  -LF      LTG 4 Progress Comments moderate  -LF        Time Calculation    PT Goal Re-Cert Due Date 06/15/23  -LF           User Key  (r) = Recorded By, (t) = Taken By, (c) = Cosigned By    Initials Name Provider Type    LF Yaneth Marcos PT Physical Therapist                 PT Assessment/Plan     Row Name 05/17/23 1100          PT Assessment    Functional Limitations Impaired gait;Limitation in home management;Limitations in community activities;Performance in leisure activities;Performance in self-care ADL;Other (comment)  -LF     Impairments Edema;Gait;Impaired flexibility;Impaired lymphatic circulation;Pain;Range of motion  -LF     Assessment Comments Continued improvement in RLE lymphedema overall, tissue less dense and firm especially at thigh. However, continues with persistant swelling  Improves with use of compression pump, worsens without compression in placed and increased time being up during the day.  Would benefit from compression thigh high garment, although this may be difficult for him to don.  Also provided info on night-time garment which would be help reduce swelling and fibrosis.  Would also be beneficial to have if he has knee surgery.  -LF     Please refer to paper survey for additional self-reported information Yes  -LF     Rehab Potential Good  -LF     Patient/caregiver participated in establishment of treatment plan and goals Yes  -LF     Patient would benefit from skilled therapy intervention Yes  -LF        PT Plan    PT Frequency 1x/week  -LF     Planned CPT's? PT EVAL MOD COMPLELITY: 99287;PT THER PROC EA 15 MIN: 08439;PT THER ACT EA 15 MIN: 27758;PT MANUAL THERAPY EA 15 MIN: 26520;PT SELF CARE/HOME MGMT/TRAIN EA 15: 31232  -LF     PT Plan Comments follow-up 2-3 weeks  -           User Key  (r) = Recorded By, (t) = Taken By, (c) = Cosigned By    Initials Name Provider Type    Yaneth Hills PT Physical Therapist                   Outcome Measure Options: Lower Extremity  Functional Scale (LEFS)  Lower Extremity Functional Index  Any of your usual work, housework or school activities: Quite a bit of difficulty  Your usual hobbies, recreational or sporting activities: Quite a bit of difficulty  Getting into or out of the bath: Moderate difficulty  Walking between rooms: A little bit of difficulty  Putting on your shoes or socks: A little bit of difficulty  Squatting: Quite a bit of difficulty  Lifting an object, like a bag of groceries from the floor: A little bit of difficulty  Performing light activities around your home: Moderate difficulty  Performing heavy activities around your home: Moderate difficulty  Getting into or out of a car: A little bit of difficulty  Walking 2 blocks: A little bit of difficulty  Walking a mile: Quite a bit of difficulty  Going up or down 10 stairs (about 1 flight of stairs): Moderate difficulty  Standing for 1 hour: Quite a bit of difficulty  Sitting for 1 hour: A little bit of difficulty  Running on even ground: Moderate difficulty  Running on uneven ground: Quite a bit of difficulty  Making sharp turns while running fast: Quite a bit of difficulty  Hopping: Quite a bit of difficulty  Rolling over in bed: A little bit of difficulty  Total: 39      Time Calculation:   Start Time: 1100  Timed Charges  17001 - PT Manual Therapy Minutes: 65  Total Minutes  Timed Charges Total Minutes: 65   Total Minutes: 65   Therapy Charges for Today     Code Description Service Date Service Provider Modifiers Qty    [id number] HC PT MANUAL THERAPY EA 15 MIN 5/17/2023 Yaneth Marcos, PT GP 4          PT G-Codes  Outcome Measure Options: Lower Extremity Functional Scale (LEFS)  Total: 39         Yaneth Marcos PT  5/17/2023

## 2023-06-02 ENCOUNTER — INFUSION (OUTPATIENT)
Dept: ONCOLOGY | Facility: HOSPITAL | Age: 68
End: 2023-06-02

## 2023-06-02 VITALS
WEIGHT: 263 LBS | TEMPERATURE: 98 F | BODY MASS INDEX: 35.67 KG/M2 | HEART RATE: 81 BPM | SYSTOLIC BLOOD PRESSURE: 139 MMHG | DIASTOLIC BLOOD PRESSURE: 70 MMHG

## 2023-06-02 DIAGNOSIS — C67.4 MALIGNANT NEOPLASM OF POSTERIOR WALL OF URINARY BLADDER: Primary | ICD-10-CM

## 2023-06-02 LAB
ALBUMIN SERPL-MCNC: 4 G/DL (ref 3.5–5.2)
ALBUMIN/GLOB SERPL: 1.3 G/DL
ALP SERPL-CCNC: 81 U/L (ref 39–117)
ALT SERPL W P-5'-P-CCNC: 9 U/L (ref 1–41)
ANION GAP SERPL CALCULATED.3IONS-SCNC: 11 MMOL/L (ref 5–15)
AST SERPL-CCNC: 11 U/L (ref 1–40)
BASOPHILS # BLD AUTO: 0.11 10*3/MM3 (ref 0–0.2)
BASOPHILS NFR BLD AUTO: 1.2 % (ref 0–1.5)
BILIRUB SERPL-MCNC: 0.3 MG/DL (ref 0–1.2)
BUN SERPL-MCNC: 18 MG/DL (ref 8–23)
BUN/CREAT SERPL: 12.7 (ref 7–25)
CALCIUM SPEC-SCNC: 9.3 MG/DL (ref 8.6–10.5)
CHLORIDE SERPL-SCNC: 103 MMOL/L (ref 98–107)
CO2 SERPL-SCNC: 21 MMOL/L (ref 22–29)
CREAT SERPL-MCNC: 1.42 MG/DL (ref 0.76–1.27)
DEPRECATED RDW RBC AUTO: 47.1 FL (ref 37–54)
EGFRCR SERPLBLD CKD-EPI 2021: 54.8 ML/MIN/1.73
EOSINOPHIL # BLD AUTO: 0.37 10*3/MM3 (ref 0–0.4)
EOSINOPHIL NFR BLD AUTO: 4 % (ref 0.3–6.2)
ERYTHROCYTE [DISTWIDTH] IN BLOOD BY AUTOMATED COUNT: 15.9 % (ref 12.3–15.4)
GLOBULIN UR ELPH-MCNC: 3.1 GM/DL
GLUCOSE SERPL-MCNC: 101 MG/DL (ref 65–99)
HCT VFR BLD AUTO: 36.9 % (ref 37.5–51)
HGB BLD-MCNC: 12.1 G/DL (ref 13–17.7)
IMM GRANULOCYTES # BLD AUTO: 0.03 10*3/MM3 (ref 0–0.05)
IMM GRANULOCYTES NFR BLD AUTO: 0.3 % (ref 0–0.5)
LYMPHOCYTES # BLD AUTO: 1.12 10*3/MM3 (ref 0.7–3.1)
LYMPHOCYTES NFR BLD AUTO: 12.2 % (ref 19.6–45.3)
MCH RBC QN AUTO: 26.5 PG (ref 26.6–33)
MCHC RBC AUTO-ENTMCNC: 32.8 G/DL (ref 31.5–35.7)
MCV RBC AUTO: 80.9 FL (ref 79–97)
MONOCYTES # BLD AUTO: 0.59 10*3/MM3 (ref 0.1–0.9)
MONOCYTES NFR BLD AUTO: 6.4 % (ref 5–12)
NEUTROPHILS NFR BLD AUTO: 6.95 10*3/MM3 (ref 1.7–7)
NEUTROPHILS NFR BLD AUTO: 75.9 % (ref 42.7–76)
NRBC BLD AUTO-RTO: 0 /100 WBC (ref 0–0.2)
PLATELET # BLD AUTO: 261 10*3/MM3 (ref 140–450)
PMV BLD AUTO: 9.2 FL (ref 6–12)
POTASSIUM SERPL-SCNC: 4.5 MMOL/L (ref 3.5–5.2)
PROT SERPL-MCNC: 7.1 G/DL (ref 6–8.5)
RBC # BLD AUTO: 4.56 10*6/MM3 (ref 4.14–5.8)
SODIUM SERPL-SCNC: 135 MMOL/L (ref 136–145)
T4 FREE SERPL-MCNC: 1.21 NG/DL (ref 0.93–1.7)
TSH SERPL DL<=0.05 MIU/L-ACNC: 1.84 UIU/ML (ref 0.27–4.2)
WBC NRBC COR # BLD: 9.17 10*3/MM3 (ref 3.4–10.8)

## 2023-06-02 PROCEDURE — 84443 ASSAY THYROID STIM HORMONE: CPT

## 2023-06-02 PROCEDURE — 80053 COMPREHEN METABOLIC PANEL: CPT

## 2023-06-02 PROCEDURE — 25010000002 NIVOLUMAB 240 MG/24ML SOLUTION 24 ML VIAL: Performed by: INTERNAL MEDICINE

## 2023-06-02 PROCEDURE — 96413 CHEMO IV INFUSION 1 HR: CPT

## 2023-06-02 PROCEDURE — 36415 COLL VENOUS BLD VENIPUNCTURE: CPT

## 2023-06-02 PROCEDURE — 84439 ASSAY OF FREE THYROXINE: CPT

## 2023-06-02 PROCEDURE — 85025 COMPLETE CBC W/AUTO DIFF WBC: CPT

## 2023-06-02 RX ORDER — SODIUM CHLORIDE 9 MG/ML
250 INJECTION, SOLUTION INTRAVENOUS ONCE
Status: DISCONTINUED | OUTPATIENT
Start: 2023-06-02 | End: 2023-06-02 | Stop reason: HOSPADM

## 2023-06-02 RX ADMIN — SODIUM CHLORIDE 480 MG: 9 INJECTION, SOLUTION INTRAVENOUS at 11:49

## 2023-07-28 ENCOUNTER — INFUSION (OUTPATIENT)
Dept: ONCOLOGY | Facility: HOSPITAL | Age: 68
End: 2023-07-28
Payer: COMMERCIAL

## 2023-07-28 VITALS
SYSTOLIC BLOOD PRESSURE: 157 MMHG | HEART RATE: 68 BPM | RESPIRATION RATE: 18 BRPM | BODY MASS INDEX: 35.57 KG/M2 | OXYGEN SATURATION: 96 % | TEMPERATURE: 97.5 F | DIASTOLIC BLOOD PRESSURE: 78 MMHG | WEIGHT: 262.3 LBS

## 2023-07-28 DIAGNOSIS — C67.4 MALIGNANT NEOPLASM OF POSTERIOR WALL OF URINARY BLADDER: Primary | ICD-10-CM

## 2023-07-28 LAB
ALBUMIN SERPL-MCNC: 4.2 G/DL (ref 3.5–5.2)
ALBUMIN/GLOB SERPL: 1.6 G/DL
ALP SERPL-CCNC: 77 U/L (ref 39–117)
ALT SERPL W P-5'-P-CCNC: 10 U/L (ref 1–41)
ANION GAP SERPL CALCULATED.3IONS-SCNC: 12.1 MMOL/L (ref 5–15)
AST SERPL-CCNC: 10 U/L (ref 1–40)
BASOPHILS # BLD AUTO: 0.09 10*3/MM3 (ref 0–0.2)
BASOPHILS NFR BLD AUTO: 1.1 % (ref 0–1.5)
BILIRUB SERPL-MCNC: 0.3 MG/DL (ref 0–1.2)
BUN SERPL-MCNC: 13 MG/DL (ref 8–23)
BUN/CREAT SERPL: 9.9 (ref 7–25)
CALCIUM SPEC-SCNC: 9.7 MG/DL (ref 8.6–10.5)
CHLORIDE SERPL-SCNC: 103 MMOL/L (ref 98–107)
CO2 SERPL-SCNC: 22.9 MMOL/L (ref 22–29)
CREAT SERPL-MCNC: 1.31 MG/DL (ref 0.76–1.27)
DEPRECATED RDW RBC AUTO: 47.9 FL (ref 37–54)
EGFRCR SERPLBLD CKD-EPI 2021: 60.4 ML/MIN/1.73
EOSINOPHIL # BLD AUTO: 0.38 10*3/MM3 (ref 0–0.4)
EOSINOPHIL NFR BLD AUTO: 4.8 % (ref 0.3–6.2)
ERYTHROCYTE [DISTWIDTH] IN BLOOD BY AUTOMATED COUNT: 16.1 % (ref 12.3–15.4)
GLOBULIN UR ELPH-MCNC: 2.7 GM/DL
GLUCOSE SERPL-MCNC: 96 MG/DL (ref 65–99)
HCT VFR BLD AUTO: 37.6 % (ref 37.5–51)
HGB BLD-MCNC: 12.4 G/DL (ref 13–17.7)
IMM GRANULOCYTES # BLD AUTO: 0.03 10*3/MM3 (ref 0–0.05)
IMM GRANULOCYTES NFR BLD AUTO: 0.4 % (ref 0–0.5)
LYMPHOCYTES # BLD AUTO: 1.46 10*3/MM3 (ref 0.7–3.1)
LYMPHOCYTES NFR BLD AUTO: 18.3 % (ref 19.6–45.3)
MCH RBC QN AUTO: 27 PG (ref 26.6–33)
MCHC RBC AUTO-ENTMCNC: 33 G/DL (ref 31.5–35.7)
MCV RBC AUTO: 81.7 FL (ref 79–97)
MONOCYTES # BLD AUTO: 0.55 10*3/MM3 (ref 0.1–0.9)
MONOCYTES NFR BLD AUTO: 6.9 % (ref 5–12)
NEUTROPHILS NFR BLD AUTO: 5.49 10*3/MM3 (ref 1.7–7)
NEUTROPHILS NFR BLD AUTO: 68.5 % (ref 42.7–76)
NRBC BLD AUTO-RTO: 0 /100 WBC (ref 0–0.2)
PLATELET # BLD AUTO: 259 10*3/MM3 (ref 140–450)
PMV BLD AUTO: 9.1 FL (ref 6–12)
POTASSIUM SERPL-SCNC: 4.6 MMOL/L (ref 3.5–5.2)
PROT SERPL-MCNC: 6.9 G/DL (ref 6–8.5)
RBC # BLD AUTO: 4.6 10*6/MM3 (ref 4.14–5.8)
SODIUM SERPL-SCNC: 138 MMOL/L (ref 136–145)
TSH SERPL DL<=0.05 MIU/L-ACNC: 1.83 UIU/ML (ref 0.27–4.2)
WBC NRBC COR # BLD: 8 10*3/MM3 (ref 3.4–10.8)

## 2023-07-28 PROCEDURE — 25010000002 NIVOLUMAB 240 MG/24ML SOLUTION 24 ML VIAL: Performed by: INTERNAL MEDICINE

## 2023-07-28 PROCEDURE — 96413 CHEMO IV INFUSION 1 HR: CPT

## 2023-07-28 PROCEDURE — 85025 COMPLETE CBC W/AUTO DIFF WBC: CPT

## 2023-07-28 PROCEDURE — 80053 COMPREHEN METABOLIC PANEL: CPT

## 2023-07-28 PROCEDURE — 84443 ASSAY THYROID STIM HORMONE: CPT

## 2023-07-28 PROCEDURE — 36415 COLL VENOUS BLD VENIPUNCTURE: CPT

## 2023-07-28 RX ORDER — SODIUM CHLORIDE 9 MG/ML
250 INJECTION, SOLUTION INTRAVENOUS ONCE
Status: DISCONTINUED | OUTPATIENT
Start: 2023-07-28 | End: 2023-07-28 | Stop reason: HOSPADM

## 2023-07-28 RX ADMIN — SODIUM CHLORIDE 480 MG: 9 INJECTION, SOLUTION INTRAVENOUS at 13:04

## 2023-08-02 ENCOUNTER — HOSPITAL ENCOUNTER (OUTPATIENT)
Dept: CT IMAGING | Facility: HOSPITAL | Age: 68
Discharge: HOME | End: 2023-08-02
Payer: MEDICARE

## 2023-08-02 DIAGNOSIS — C67.4 MALIGNANT NEOPLASM OF POSTERIOR WALL OF URINARY BLADDER: ICD-10-CM

## 2023-08-02 PROCEDURE — 74176 CT ABD & PELVIS W/O CONTRAST: CPT

## 2023-08-02 PROCEDURE — 71250 CT THORAX DX C-: CPT

## 2023-08-04 ENCOUNTER — OFFICE VISIT (OUTPATIENT)
Dept: ONCOLOGY | Facility: CLINIC | Age: 68
End: 2023-08-04
Payer: MEDICARE

## 2023-08-04 VITALS
BODY MASS INDEX: 35.49 KG/M2 | TEMPERATURE: 97.2 F | OXYGEN SATURATION: 98 % | HEART RATE: 73 BPM | SYSTOLIC BLOOD PRESSURE: 134 MMHG | WEIGHT: 262 LBS | DIASTOLIC BLOOD PRESSURE: 72 MMHG | HEIGHT: 72 IN

## 2023-08-04 DIAGNOSIS — C67.4 MALIGNANT NEOPLASM OF POSTERIOR WALL OF URINARY BLADDER: Primary | ICD-10-CM

## 2023-08-04 RX ORDER — PALONOSETRON 0.05 MG/ML
0.25 INJECTION, SOLUTION INTRAVENOUS ONCE
OUTPATIENT
Start: 2023-08-18

## 2023-08-04 RX ORDER — SODIUM CHLORIDE 9 MG/ML
250 INJECTION, SOLUTION INTRAVENOUS ONCE
OUTPATIENT
Start: 2023-08-18

## 2023-08-04 RX ORDER — OLANZAPINE 5 MG/1
5 TABLET ORAL ONCE
OUTPATIENT
Start: 2023-08-18 | End: 2023-08-18

## 2023-08-04 RX ORDER — OLANZAPINE 5 MG/1
5 TABLET ORAL ONCE
OUTPATIENT
Start: 2023-08-11 | End: 2023-08-11

## 2023-08-04 RX ORDER — SODIUM CHLORIDE 9 MG/ML
250 INJECTION, SOLUTION INTRAVENOUS ONCE
OUTPATIENT
Start: 2023-08-11

## 2023-08-04 RX ORDER — PALONOSETRON 0.05 MG/ML
0.25 INJECTION, SOLUTION INTRAVENOUS ONCE
OUTPATIENT
Start: 2023-08-11

## 2023-08-08 ENCOUNTER — TELEPHONE (OUTPATIENT)
Dept: ONCOLOGY | Facility: CLINIC | Age: 68
End: 2023-08-08
Payer: COMMERCIAL

## 2023-08-08 NOTE — TELEPHONE ENCOUNTER
Caller: Tru Buitrago    Relationship: Self    Best call back number: [phone]    What is the best time to reach you: ANYTIME    Who are you requesting to speak with (clinical staff, provider, specific staff member): SCHEDULING    What was the call regarding: PATIENT WOULD LIKE TO R/S HIS 8/11 APPT TIME FROM 830 AM TO AROUND 1 PM IF POSSIBLE. PLEASE CALL TO ADVISE.

## 2023-08-11 ENCOUNTER — OFFICE VISIT (OUTPATIENT)
Dept: ONCOLOGY | Facility: CLINIC | Age: 68
End: 2023-08-11
Payer: MEDICARE

## 2023-08-11 VITALS
OXYGEN SATURATION: 98 % | TEMPERATURE: 97.5 F | BODY MASS INDEX: 35.76 KG/M2 | SYSTOLIC BLOOD PRESSURE: 142 MMHG | WEIGHT: 264 LBS | HEART RATE: 75 BPM | DIASTOLIC BLOOD PRESSURE: 71 MMHG | HEIGHT: 72 IN | RESPIRATION RATE: 16 BRPM

## 2023-08-11 DIAGNOSIS — C67.4 MALIGNANT NEOPLASM OF POSTERIOR WALL OF URINARY BLADDER: Primary | ICD-10-CM

## 2023-08-11 RX ORDER — OLANZAPINE 5 MG/1
5 TABLET ORAL NIGHTLY
Qty: 3 TABLET | Refills: 5 | Status: SHIPPED | OUTPATIENT
Start: 2023-08-11

## 2023-08-11 RX ORDER — DIPHENHYDRAMINE, LIDOCAINE, NYSTATIN
KIT ORAL
Qty: 240 ML | Refills: 3 | Status: SHIPPED | OUTPATIENT
Start: 2023-08-11

## 2023-08-11 RX ORDER — PROMETHAZINE HYDROCHLORIDE 25 MG/1
25 TABLET ORAL EVERY 6 HOURS PRN
Qty: 30 TABLET | Refills: 3 | Status: SHIPPED | OUTPATIENT
Start: 2023-08-11

## 2023-08-11 RX ORDER — ONDANSETRON HYDROCHLORIDE 8 MG/1
8 TABLET, FILM COATED ORAL 3 TIMES DAILY PRN
Qty: 30 TABLET | Refills: 5 | Status: SHIPPED | OUTPATIENT
Start: 2023-08-11

## 2023-08-11 NOTE — PROGRESS NOTES
PROBLEM LIST:  Oncology/Hematology History   Malignant neoplasm of posterior wall of urinary bladder   6/2/2022 Cancer Staged    Staging form: Urinary Bladder, AJCC 8th Edition  - Clinical stage from 6/2/2022: Stage II (cT2, cN0, cM0) - Signed by Kev Cobb MD on 6/29/2022 6/29/2022 Initial Diagnosis    Malignant neoplasm of posterior wall of urinary bladder (HCC)     8/2/2022 Cancer Staged    Staging form: Urinary Bladder, AJCC 8th Edition  - Pathologic stage from 8/2/2022: Stage IIIA (pT3b, pN0, cM0) - Signed by Beverly Valladares MD on 8/25/2022 9/9/2022 - 7/28/2023 Chemotherapy    OP BLADDER Nivolumab 480 mg       8/11/2023 - 8/11/2023 Chemotherapy    OP BLADDER CISplatin D1,D8 / Gemcitabine D1,D8       8/18/2023 Biopsy    OP BLADDER CARBOplatin / Gemcitabine  Plan Provider: Beverly Valladares MD  Treatment goal: Palliative  Line of treatment: [No plan line of treatment]         REASON FOR VISIT: Management of my bladder cancer    HISTORY OF PRESENT ILLNESS:   67 y.o.  male presents today for follow-up of his bladder cancer.  He was scheduled for his last dose of immunotherapy and unfortunately he presents with CAT scans that shows likely progressive disease. He is to have pain in his right groin that radiates down his leg.  He continues to have pelvic discomfort that his hydrocodone is not helping.  Burning in his penis is stable overall.  He had to cancel his pain management appointment for treatment today.  He says he feels numb and is very upset that his cancer has reoccurred.  He is working through this and declines any depression or anxiety medication.           Past medical history, social history and family history was reviewed 08/11/23 and unchanged from prior visit.    Review of Systems:    Review of Systems   Constitutional: Negative.    HENT:  Negative.     Eyes: Negative.    Respiratory:  Positive for cough.    Cardiovascular: Negative.    Gastrointestinal: Negative.          Burning in his penis   Endocrine: Negative.    Genitourinary:  Positive for pelvic pain.    Musculoskeletal:  Positive for arthralgias.   Skin: Negative.    Neurological: Negative.    Hematological: Negative.    Psychiatric/Behavioral:  The patient is nervous/anxious.           Medications:        Current Outpatient Medications:     albuterol sulfate  (90 Base) MCG/ACT inhaler, Inhale 2 puffs Every 4 (Four) Hours As Needed for Wheezing., Disp: , Rfl:     Anoro Ellipta 62.5-25 MCG/INH aerosol powder  inhaler, Inhale 1 puff Daily., Disp: , Rfl:     atorvastatin (LIPITOR) 20 MG tablet, Take 1 tablet by mouth Daily., Disp: , Rfl:     esomeprazole (nexIUM) 40 MG capsule, , Disp: , Rfl:     famotidine (PEPCID) 40 MG tablet, Take 1 tablet by mouth 3 (Three) Times a Day., Disp: , Rfl:     fexofenadine (ALLEGRA) 180 MG tablet, Take 1 tablet by mouth Daily., Disp: , Rfl:     gabapentin (NEURONTIN) 600 MG tablet, Take 1 tablet by mouth 3 (Three) Times a Day., Disp: , Rfl:     HYDROcodone-acetaminophen (NORCO) 7.5-325 MG per tablet, Take 1 tablet by mouth Every 6 (Six) Hours As Needed for Moderate Pain . (Patient taking differently: Take 1 tablet by mouth Every 6 (Six) Hours As Needed for Moderate Pain. Increased  to  mg TID), Disp: 30 tablet, Rfl: 0    losartan (COZAAR) 50 MG tablet, Take 1 tablet by mouth Daily., Disp: , Rfl:     montelukast (SINGULAIR) 10 MG tablet, Take 1 tablet by mouth Every Night., Disp: , Rfl:     multivitamin with minerals tablet tablet, Take 1 tablet by mouth Daily., Disp: , Rfl:     nicotine (NICODERM CQ) 14 MG/24HR patch, Place 1 patch on the skin as directed by provider Daily., Disp: , Rfl:     OLANZapine (zyPREXA) 5 MG tablet, Take 1 tablet by mouth Every Night. Take on days 2, 3 and 4 after chemotherapy., Disp: 3 tablet, Rfl: 5    ondansetron (ZOFRAN) 8 MG tablet, Take 1 tablet by mouth 3 (Three) Times a Day As Needed for Nausea or Vomiting., Disp: 30 tablet, Rfl: 5     "polyethylene glycol (MIRALAX) 17 g packet, Take 17 g by mouth Daily., Disp: 30 packet, Rfl: 0    promethazine (PHENERGAN) 25 MG tablet, , Disp: , Rfl:     rivaroxaban (XARELTO) 20 MG tablet, Take 1 tablet by mouth Every Night., Disp: , Rfl:     tiZANidine (ZANAFLEX) 4 MG tablet, Take 1 tablet by mouth Every 8 (Eight) Hours As Needed., Disp: , Rfl:     verapamil ER (VERELAN) 240 MG 24 hr capsule, Take 1 capsule by mouth Every Night., Disp: , Rfl:     Pain Medications               gabapentin (NEURONTIN) 600 MG tablet Take 1 tablet by mouth 3 (Three) Times a Day.    HYDROcodone-acetaminophen (NORCO) 7.5-325 MG per tablet Take 1 tablet by mouth Every 6 (Six) Hours As Needed for Moderate Pain .    OLANZapine (zyPREXA) 5 MG tablet Take 1 tablet by mouth Every Night. Take on days 2, 3 and 4 after chemotherapy.    tiZANidine (ZANAFLEX) 4 MG tablet Take 1 tablet by mouth Every 8 (Eight) Hours As Needed.               ALLERGIES:    Allergies   Allergen Reactions    Fluticasone-Salmeterol Unknown - Low Severity     Thrush    Spiriva Respimat [Tiotropium Bromide Monohydrate] Unknown - Low Severity     Thrush    Tetanus Toxoids Swelling    Food Color Orange [Yellow Dye] Nausea Only    Naproxen Nausea Only    Orange Flavor GI Intolerance         Physical Exam    VITAL SIGNS:  /71   Pulse 75   Temp 97.5 øF (36.4 øC) (Temporal)   Resp 16   Ht 182.9 cm (72\")   Wt 120 kg (264 lb)   SpO2 98%   BMI 35.80 kg/mý                 Wt Readings from Last 3 Encounters:   08/11/23 120 kg (264 lb)   08/04/23 119 kg (262 lb)   07/28/23 119 kg (262 lb 4.8 oz)       Body mass index is 35.8 kg/mý. Body surface area is 2.4 meters squared.       Performance Status: 1    General: well appearing male in no acute distress  Neuro: alert and oriented  HEENT: sclera anicteric, oropharynx clear  Lymphatics: no cervical, supraclavicular, or axillary adenopathy  Cardiovascular: regular rate and rhythm, no murmurs  Lungs: clear to auscultation " bilaterally  Abdomen: soft, nontender, nondistended.  No palpable organomegaly  Extremeties: no lower extremity edema  Skin: no rashes, lesions, bruising, or petechiae  Psych: mood and affect appropriate          RECENT LABS:    Lab Results   Component Value Date    HGB 12.4 (L) 07/28/2023    HCT 37.6 07/28/2023    MCV 81.7 07/28/2023     07/28/2023    WBC 8.00 07/28/2023    NEUTROABS 5.49 07/28/2023    LYMPHSABS 1.46 07/28/2023    MONOSABS 0.55 07/28/2023    EOSABS 0.38 07/28/2023    BASOSABS 0.09 07/28/2023       Lab Results   Component Value Date    GLUCOSE 96 07/28/2023    BUN 13 07/28/2023    CREATININE 1.31 (H) 07/28/2023     07/28/2023    K 4.6 07/28/2023     07/28/2023    CO2 22.9 07/28/2023    CALCIUM 9.7 07/28/2023    PROTEINTOT 6.9 07/28/2023    ALBUMIN 4.2 07/28/2023    BILITOT 0.3 07/28/2023    ALKPHOS 77 07/28/2023    AST 10 07/28/2023    ALT 10 07/28/2023     CT Abdomen Pelvis Without Contrast    Result Date: 8/2/2023  Right pelvic soft  tissue mass as described above, right iliac and femoral adenopathy concerning for jacob metastasis. Findings are concerning for disease progression..     .   This study was performed with techniques to keep radiation doses as low as reasonably achievable (ALARA). Individualized dose reduction techniques using automated exposure control or adjustment of mA and/or kV according to the patient size were employed.     Images were reviewed, interpreted, and dictated by CHRISTA Rene Transcribed by Brandon Garcia PA-C.  This report was signed and finalized on 8/2/2023 7:30 PM by CHRISTA Castillo.      CT Chest Without Contrast Diagnostic    Result Date: 8/2/2023  No significant interval change.  No evidence of disease progression or recurrence to the chest..     This study was performed with techniques to keep radiation doses as low as reasonably achievable (ALARA). Individualized dose reduction techniques using automated exposure control or  adjustment of mA and/or kV according to the patient size were employed.     Images were reviewed, interpreted, and dictated by CHRISTA Rene Transcribed by Brandon Garcia PA-C.  This report was signed and finalized on 8/2/2023 7:31 PM by CHRISTA Castillo.      NM PET/CT Skull Base to Mid Thigh    Result Date: 4/21/2023  Impression: No evidence of hypermetabolic recurrent or metastatic disease. Postoperative changes of bladder resection with right lower quadrant ileal conduit and urostomy formation. Few scattered pulmonary nodules, similar to prior examination without focal FDG uptake. Recommend continued attention on follow-up. Increased subcutaneous edema and swelling of the right lower extremity, nonspecific. Electronically Signed: Milton Loza  4/21/2023 6:03 PM EDT  Workstation ID: [id number]        TOPICS EDUCATION PROVIDED   ANEMIA:  role of RBC, cause, s/s, ways to manage, role of transfusion [x]   THROMBOCYTOPENIA:  role of platelet, cause, s/s, ways to prevent bleeding, things to avoid, when to seek help [x]   NEUTROPENIA:  role of WBC, cause, infection precautions, s/s of infection, when to call MD [x]   NUTRITION & APPETITE CHANGES:  importance of maintaining healthy diet & weight, ways to manage to improve intake, dietary consult, exercise regimen [x]   DIARRHEA:  causes, s/s of dehydration, ways to manage, dietary changes, when to call MD [x]   CONSTIPATION:  causes, ways to manage, dietary changes, when to call MD [x]   NAUSEA & VOMITING:  cause, use of antiemetics, dietary changes, when to call MD [x]   MOUTH SORES:  causes, oral care, ways to manage [x]   ALOPECIA:  cause, ways to manage, resources [x]   INFERTILITY & SEXUALITY:  causes, fertility preservation options, sexuality changes, ways to manage, importance of birth control [x]   NERVOUS SYSTEM CHANGES:  causes, s/s, neuropathies, cognitive changes, ways to manage [x]   PAIN:  causes, ways to manage [x]   SKIN & NAIL CHANGES:   cause, s/s, ways to manage [x]   ORGAN TOXICITIES:  cause, s/s, need for diagnostic tests, labs, when to notify MD [x]   SURVIVORSHIP:  distress, distress assessment, secondary malignancies, early/late effects, follow-up, social issues, social support [x]   HOME CARE:  use of spill kits, storing of PO chemo, how to manage bodily fluids [x]   MISCELLANEOUS:  drug interactions, administration, vesicant, et [x]           Assessment/Plan    1.  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  Unfortunately, insurance has not approved his treatment today with carboplatin and Gemzar.  Unfortunately, at last check his CT scan did show some concern about recurrent disease.  PET scan has not been scheduled.  I will ask my office to assist with this process.  I reviewed with the patient and his wife that having recurrence like this makes his disease is incurable at this point and our treatment is palliative and to extend life.     We will reschedule his carboplatin and Gemzar for 1 week to allow for insurance approval.  As above, We discussed potential side effects of carboplatin and gemcitabine including myelosuppression, fatigue, nausea/vomiting, constipation, infusion reaction, and neutropenic fever.    The patient and I have reviewed their cancer diagnosis and scheduled treatment plan. Chemotherapy teaching was also completed today as documented above. Adequate time was given to answer all questions to their satisfaction. Patient and family are aware of their care team members and contact information if they have questions or problems throughout the treatment course      2.  Severe lymphedema secondary to lymphadenectomy in the right leg.  Seeing physical therapy .      3.  Burning sensation in the penis.  Likely due to recurrence.  We will make sure PET scan is scheduled.    4.  Scheduled for knee replacement.  If he does have recurrent disease then my plan is to put off his knee replacement  until he is better or stable.    5. Mouth sores. I will order MMW order today.     6. Treatment related nausea. Zofran does not help. I will refill phenergan.     Total time of patient care including time prior to, face to face with patient, and following visit spent in reviewing records, lab results, imaging studies, discussion with patient, and documentation/charting was > 45 minutes      ALLEN Crabtree  [facility]    Return on: 09/01/23    Orders Placed This Encounter   Procedures    Comprehensive metabolic panel    CBC and Differential    CBC and Differential         8/11/2023

## 2023-08-14 ENCOUNTER — TELEPHONE (OUTPATIENT)
Dept: ONCOLOGY | Facility: CLINIC | Age: 68
End: 2023-08-14

## 2023-08-14 NOTE — TELEPHONE ENCOUNTER
Caller: SHON GUERRERO    Relationship: Emergency Contact    Best call back number: [phone]    What is the best time to reach you: ANY    Who are you requesting to speak with (clinical staff, provider,  specific staff member): [employer] LAW/CLINICAL        What was the call regarding: PATIENT'S SIGNIFICANT OTHER SHON CALLED TO TALK TO [place] ABOUT INSURANCE FOR [place]. SHE SAID THEY SPOKE TO WYATT LAST FRIDAY AND THAT THE INSURANCE WON'T COVER INFUSIONS. SHON WAS CALLING BACK TO SEE WHAT WAS GOING ON AND WHAT TO DO MOVING FORWARD.    Is it okay if the provider responds through MyChart: NO

## 2023-08-16 ENCOUNTER — TELEPHONE (OUTPATIENT)
Dept: ONCOLOGY | Facility: CLINIC | Age: 68
End: 2023-08-16
Payer: COMMERCIAL

## 2023-08-16 ENCOUNTER — TELEPHONE (OUTPATIENT)
Dept: ONCOLOGY | Facility: CLINIC | Age: 68
End: 2023-08-16

## 2023-08-16 NOTE — TELEPHONE ENCOUNTER
Caller: Tru Buitrago    Relationship: Self    Best call back number: [phone]    What is the best time to reach you: ASAP    Who are you requesting to speak with (clinical staff, provider,  specific staff member): CLINICAL    What was the call regarding: REQUESTING A CALL BACK FROM [employer] TO DISCUSS A LETTER HE RECEIVED TODAY FOR THE AUTHORIZATION     Is it okay if the provider responds through [employer]: YES

## 2023-08-16 NOTE — TELEPHONE ENCOUNTER
Returned call to patient informing him at this time that insurance hadn't approved treatment. Informing patient nurse will notify him by tomorrow afternoon.

## 2023-08-16 NOTE — TELEPHONE ENCOUNTER
Caller: Tru Buitrago    Relationship: Self    Best call back number: [phone]    What is the best time to reach you: ANYTIME    Who are you requesting to speak with (clinical staff, provider, specific staff member):     What was the call regarding: PATIENT WAS CALLING TO SEE IF HIS 8/18 APPT HAD BEEN AUTHORIZED/APPROVED YET WITH HIS INSURANCE COMPANY. PLEASE CALL TO ADVISE.

## 2023-08-16 NOTE — TELEPHONE ENCOUNTER
Patient received message about previous chemo order. Informing patient that cisplatin has been on a shortage list. Informing patient that we will here something by lunch time we can always call.

## 2023-08-17 ENCOUNTER — TELEPHONE (OUTPATIENT)
Dept: ONCOLOGY | Facility: CLINIC | Age: 68
End: 2023-08-17
Payer: COMMERCIAL

## 2023-08-17 NOTE — TELEPHONE ENCOUNTER
Called patient informing him that his treatment had been authorized. Patient stating he was glad to hear.

## 2023-08-18 ENCOUNTER — INFUSION (OUTPATIENT)
Dept: ONCOLOGY | Facility: HOSPITAL | Age: 68
End: 2023-08-18
Payer: COMMERCIAL

## 2023-08-18 VITALS
BODY MASS INDEX: 35.78 KG/M2 | WEIGHT: 263.8 LBS | DIASTOLIC BLOOD PRESSURE: 65 MMHG | SYSTOLIC BLOOD PRESSURE: 144 MMHG | HEART RATE: 97 BPM | OXYGEN SATURATION: 98 % | RESPIRATION RATE: 18 BRPM | TEMPERATURE: 98 F

## 2023-08-18 DIAGNOSIS — C67.4 MALIGNANT NEOPLASM OF POSTERIOR WALL OF URINARY BLADDER: Primary | ICD-10-CM

## 2023-08-18 LAB
ALBUMIN SERPL-MCNC: 4 G/DL (ref 3.5–5.2)
ALBUMIN/GLOB SERPL: 1.4 G/DL
ALP SERPL-CCNC: 88 U/L (ref 39–117)
ALT SERPL W P-5'-P-CCNC: 11 U/L (ref 1–41)
ANION GAP SERPL CALCULATED.3IONS-SCNC: 11.4 MMOL/L (ref 5–15)
AST SERPL-CCNC: 18 U/L (ref 1–40)
BASOPHILS # BLD AUTO: 0.08 10*3/MM3 (ref 0–0.2)
BASOPHILS NFR BLD AUTO: 1 % (ref 0–1.5)
BILIRUB SERPL-MCNC: 0.3 MG/DL (ref 0–1.2)
BUN SERPL-MCNC: 11 MG/DL (ref 8–23)
BUN/CREAT SERPL: 7.3 (ref 7–25)
CALCIUM SPEC-SCNC: 9.9 MG/DL (ref 8.6–10.5)
CHLORIDE SERPL-SCNC: 102 MMOL/L (ref 98–107)
CO2 SERPL-SCNC: 23.6 MMOL/L (ref 22–29)
CREAT SERPL-MCNC: 1.5 MG/DL (ref 0.76–1.27)
DEPRECATED RDW RBC AUTO: 48 FL (ref 37–54)
EGFRCR SERPLBLD CKD-EPI 2021: 51.3 ML/MIN/1.73
EOSINOPHIL # BLD AUTO: 0.42 10*3/MM3 (ref 0–0.4)
EOSINOPHIL NFR BLD AUTO: 5 % (ref 0.3–6.2)
ERYTHROCYTE [DISTWIDTH] IN BLOOD BY AUTOMATED COUNT: 15.9 % (ref 12.3–15.4)
GLOBULIN UR ELPH-MCNC: 2.9 GM/DL
GLUCOSE SERPL-MCNC: 108 MG/DL (ref 65–99)
HCT VFR BLD AUTO: 36.2 % (ref 37.5–51)
HGB BLD-MCNC: 11.8 G/DL (ref 13–17.7)
IMM GRANULOCYTES # BLD AUTO: 0.06 10*3/MM3 (ref 0–0.05)
IMM GRANULOCYTES NFR BLD AUTO: 0.7 % (ref 0–0.5)
LYMPHOCYTES # BLD AUTO: 1.4 10*3/MM3 (ref 0.7–3.1)
LYMPHOCYTES NFR BLD AUTO: 16.7 % (ref 19.6–45.3)
MCH RBC QN AUTO: 26.9 PG (ref 26.6–33)
MCHC RBC AUTO-ENTMCNC: 32.6 G/DL (ref 31.5–35.7)
MCV RBC AUTO: 82.5 FL (ref 79–97)
MONOCYTES # BLD AUTO: 0.71 10*3/MM3 (ref 0.1–0.9)
MONOCYTES NFR BLD AUTO: 8.5 % (ref 5–12)
NEUTROPHILS NFR BLD AUTO: 5.69 10*3/MM3 (ref 1.7–7)
NEUTROPHILS NFR BLD AUTO: 68.1 % (ref 42.7–76)
NRBC BLD AUTO-RTO: 0 /100 WBC (ref 0–0.2)
PLATELET # BLD AUTO: 277 10*3/MM3 (ref 140–450)
PMV BLD AUTO: 9 FL (ref 6–12)
POTASSIUM SERPL-SCNC: 4.6 MMOL/L (ref 3.5–5.2)
PROT SERPL-MCNC: 6.9 G/DL (ref 6–8.5)
RBC # BLD AUTO: 4.39 10*6/MM3 (ref 4.14–5.8)
SODIUM SERPL-SCNC: 137 MMOL/L (ref 136–145)
WBC NRBC COR # BLD: 8.36 10*3/MM3 (ref 3.4–10.8)

## 2023-08-18 PROCEDURE — 96417 CHEMO IV INFUS EACH ADDL SEQ: CPT

## 2023-08-18 PROCEDURE — 96367 TX/PROPH/DG ADDL SEQ IV INF: CPT

## 2023-08-18 PROCEDURE — 85025 COMPLETE CBC W/AUTO DIFF WBC: CPT

## 2023-08-18 PROCEDURE — 96413 CHEMO IV INFUSION 1 HR: CPT

## 2023-08-18 PROCEDURE — 80053 COMPREHEN METABOLIC PANEL: CPT

## 2023-08-18 PROCEDURE — 25010000002 PALONOSETRON 0.25 MG/5ML SOLUTION PREFILLED SYRINGE: Performed by: INTERNAL MEDICINE

## 2023-08-18 PROCEDURE — 25010000002 GEMCITABINE 1 GM/26.3ML SOLUTION 26.3 ML VIAL: Performed by: INTERNAL MEDICINE

## 2023-08-18 PROCEDURE — 25010000002 CARBOPLATIN PER 50 MG: Performed by: INTERNAL MEDICINE

## 2023-08-18 PROCEDURE — 25010000002 DEXAMETHASONE SODIUM PHOSPHATE 20 MG/5ML SOLUTION: Performed by: INTERNAL MEDICINE

## 2023-08-18 PROCEDURE — 25010000002 FOSAPREPITANT PER 1 MG: Performed by: INTERNAL MEDICINE

## 2023-08-18 PROCEDURE — 25010000002 GEMCITABINE 200 MG/5.26ML SOLUTION 5.26 ML VIAL: Performed by: INTERNAL MEDICINE

## 2023-08-18 PROCEDURE — 96366 THER/PROPH/DIAG IV INF ADDON: CPT

## 2023-08-18 PROCEDURE — 96375 TX/PRO/DX INJ NEW DRUG ADDON: CPT

## 2023-08-18 RX ORDER — SODIUM CHLORIDE 9 MG/ML
250 INJECTION, SOLUTION INTRAVENOUS ONCE
Status: DISCONTINUED | OUTPATIENT
Start: 2023-08-18 | End: 2023-08-18 | Stop reason: HOSPADM

## 2023-08-18 RX ORDER — OLANZAPINE 5 MG/1
5 TABLET ORAL ONCE
Status: COMPLETED | OUTPATIENT
Start: 2023-08-18 | End: 2023-08-18

## 2023-08-18 RX ORDER — PALONOSETRON 0.05 MG/ML
0.25 INJECTION, SOLUTION INTRAVENOUS ONCE
Status: COMPLETED | OUTPATIENT
Start: 2023-08-18 | End: 2023-08-18

## 2023-08-18 RX ADMIN — CARBOPLATIN 540 MG: 10 INJECTION, SOLUTION INTRAVENOUS at 12:11

## 2023-08-18 RX ADMIN — FOSAPREPITANT 150 MG: 150 INJECTION, POWDER, LYOPHILIZED, FOR SOLUTION INTRAVENOUS at 11:01

## 2023-08-18 RX ADMIN — DEXAMETHASONE SODIUM PHOSPHATE 12 MG: 4 INJECTION, SOLUTION INTRA-ARTICULAR; INTRALESIONAL; INTRAMUSCULAR; INTRAVENOUS; SOFT TISSUE at 11:01

## 2023-08-18 RX ADMIN — OLANZAPINE 5 MG: 5 TABLET, FILM COATED ORAL at 11:22

## 2023-08-18 RX ADMIN — PALONOSETRON 0.25 MG: 0.25 INJECTION, SOLUTION INTRAVENOUS at 11:22

## 2023-08-18 RX ADMIN — GEMCITABINE HYDROCHLORIDE 2400 MG: 1 INJECTION, SOLUTION INTRAVENOUS at 11:35

## 2023-08-22 ENCOUNTER — HOSPITAL ENCOUNTER (OUTPATIENT)
Dept: PET IMAGING | Facility: HOSPITAL | Age: 68
Discharge: HOME | End: 2023-08-22
Payer: MEDICARE

## 2023-08-22 DIAGNOSIS — C67.4 MALIGNANT NEOPLASM OF POSTERIOR WALL OF URINARY BLADDER: ICD-10-CM

## 2023-08-22 LAB — GLUCOSE BLDC GLUCOMTR-MCNC: 115 MG/DL (ref 70–130)

## 2023-08-22 PROCEDURE — 78815 PET IMAGE W/CT SKULL-THIGH: CPT

## 2023-08-22 PROCEDURE — 82948 REAGENT STRIP/BLOOD GLUCOSE: CPT

## 2023-08-22 PROCEDURE — A9552 F18 FDG: HCPCS | Performed by: INTERNAL MEDICINE

## 2023-08-22 PROCEDURE — 0 FLUDEOXYGLUCOSE F18 SOLUTION: Performed by: INTERNAL MEDICINE

## 2023-08-22 RX ADMIN — FLUDEOXYGLUCOSE F18 1 DOSE: 300 INJECTION INTRAVENOUS at 09:10

## 2023-08-24 ENCOUNTER — TELEPHONE (OUTPATIENT)
Dept: ONCOLOGY | Facility: CLINIC | Age: 68
End: 2023-08-24

## 2023-08-24 NOTE — TELEPHONE ENCOUNTER
Caller: Tru Buitrago    Relationship: Self    Best call back number: [phone]     What is the best time to reach you: ANYTIME    Who are you requesting to speak with (clinical staff, provider,  specific staff member): NON-CLINICAL    What was the call regarding: PT HAS INFUSION FOR TOMORROW AND WAS WANTING TO SEE IF HE COULD BE SCHEDULED WITH EITHER DR. GARCIA OR WYATT WHILE HERE FOR THE INFUSION TO GO OVER HIS PET SCAN RESULTS.    IF THERE IS NOT AN APPT AVAILABLE PT WOULD LIKE A CALL BACK TO GO OVER THE RESULTS.     Is it okay if the provider responds through [employer]: NO - PLEASE CALL THE PT TO ADVISE.

## 2023-08-25 ENCOUNTER — OFFICE VISIT (OUTPATIENT)
Dept: ONCOLOGY | Facility: CLINIC | Age: 68
End: 2023-08-25
Payer: COMMERCIAL

## 2023-08-25 ENCOUNTER — INFUSION (OUTPATIENT)
Dept: ONCOLOGY | Facility: HOSPITAL | Age: 68
End: 2023-08-25
Payer: COMMERCIAL

## 2023-08-25 VITALS
HEART RATE: 88 BPM | SYSTOLIC BLOOD PRESSURE: 125 MMHG | BODY MASS INDEX: 34.81 KG/M2 | DIASTOLIC BLOOD PRESSURE: 69 MMHG | HEIGHT: 72 IN | WEIGHT: 257 LBS | OXYGEN SATURATION: 98 % | RESPIRATION RATE: 16 BRPM | TEMPERATURE: 97.5 F

## 2023-08-25 DIAGNOSIS — C67.4 MALIGNANT NEOPLASM OF POSTERIOR WALL OF URINARY BLADDER: Primary | ICD-10-CM

## 2023-08-25 LAB
BASOPHILS # BLD AUTO: 0.07 10*3/MM3 (ref 0–0.2)
BASOPHILS NFR BLD AUTO: 1.5 % (ref 0–1.5)
DEPRECATED RDW RBC AUTO: 46.5 FL (ref 37–54)
EOSINOPHIL # BLD AUTO: 0.09 10*3/MM3 (ref 0–0.4)
EOSINOPHIL NFR BLD AUTO: 2 % (ref 0.3–6.2)
ERYTHROCYTE [DISTWIDTH] IN BLOOD BY AUTOMATED COUNT: 15.5 % (ref 12.3–15.4)
HCT VFR BLD AUTO: 35.4 % (ref 37.5–51)
HGB BLD-MCNC: 11.7 G/DL (ref 13–17.7)
IMM GRANULOCYTES # BLD AUTO: 0.02 10*3/MM3 (ref 0–0.05)
IMM GRANULOCYTES NFR BLD AUTO: 0.4 % (ref 0–0.5)
LYMPHOCYTES # BLD AUTO: 1.07 10*3/MM3 (ref 0.7–3.1)
LYMPHOCYTES NFR BLD AUTO: 23.4 % (ref 19.6–45.3)
MCH RBC QN AUTO: 27.3 PG (ref 26.6–33)
MCHC RBC AUTO-ENTMCNC: 33.1 G/DL (ref 31.5–35.7)
MCV RBC AUTO: 82.7 FL (ref 79–97)
MONOCYTES # BLD AUTO: 0.3 10*3/MM3 (ref 0.1–0.9)
MONOCYTES NFR BLD AUTO: 6.6 % (ref 5–12)
NEUTROPHILS NFR BLD AUTO: 3.03 10*3/MM3 (ref 1.7–7)
NEUTROPHILS NFR BLD AUTO: 66.1 % (ref 42.7–76)
NRBC BLD AUTO-RTO: 0 /100 WBC (ref 0–0.2)
PLATELET # BLD AUTO: 202 10*3/MM3 (ref 140–450)
PMV BLD AUTO: 8.8 FL (ref 6–12)
RBC # BLD AUTO: 4.28 10*6/MM3 (ref 4.14–5.8)
WBC NRBC COR # BLD: 4.58 10*3/MM3 (ref 3.4–10.8)

## 2023-08-25 PROCEDURE — 96375 TX/PRO/DX INJ NEW DRUG ADDON: CPT

## 2023-08-25 PROCEDURE — 85025 COMPLETE CBC W/AUTO DIFF WBC: CPT

## 2023-08-25 PROCEDURE — 25010000002 GEMCITABINE 1 GM/26.3ML SOLUTION 26.3 ML VIAL: Performed by: INTERNAL MEDICINE

## 2023-08-25 PROCEDURE — 36415 COLL VENOUS BLD VENIPUNCTURE: CPT

## 2023-08-25 PROCEDURE — 25010000002 GEMCITABINE 200 MG/5.26ML SOLUTION 5.26 ML VIAL: Performed by: INTERNAL MEDICINE

## 2023-08-25 PROCEDURE — 96413 CHEMO IV INFUSION 1 HR: CPT

## 2023-08-25 PROCEDURE — 25010000002 DEXAMETHASONE SODIUM PHOSPHATE 20 MG/5ML SOLUTION: Performed by: INTERNAL MEDICINE

## 2023-08-25 RX ORDER — OXYCODONE AND ACETAMINOPHEN 10; 325 MG/1; MG/1
1 TABLET ORAL EVERY 8 HOURS PRN
COMMUNITY
Start: 2023-08-12

## 2023-08-25 RX ORDER — SODIUM CHLORIDE 9 MG/ML
250 INJECTION, SOLUTION INTRAVENOUS ONCE
Status: DISCONTINUED | OUTPATIENT
Start: 2023-08-25 | End: 2023-08-25 | Stop reason: HOSPADM

## 2023-08-25 RX ADMIN — GEMCITABINE HYDROCHLORIDE 2400 MG: 1 INJECTION, SOLUTION INTRAVENOUS at 15:12

## 2023-08-25 RX ADMIN — DEXAMETHASONE SODIUM PHOSPHATE 12 MG: 4 INJECTION, SOLUTION INTRA-ARTICULAR; INTRALESIONAL; INTRAMUSCULAR; INTRAVENOUS; SOFT TISSUE at 14:54

## 2023-08-25 NOTE — LETTER
August 25, 2023     Asad Narayanan MD  [address] [facility]  [address] [place] [id number]    Patient: Tru Buitrago   YOB: 1955   Date of Visit: 8/25/2023     Dear Asad Narayanan MD:       Thank you for referring Tru Buitrago to me for evaluation. Below are the relevant portions of my assessment and plan of care.    If you have questions, please do not hesitate to call me. I look forward to following Tru along with you.         Sincerely,        ALLEN Crabtree        CC: No Recipients    Mesha Billings APRN  08/25/23 1505  Sign when Signing Visit  PROBLEM LIST:  Oncology/Hematology History   Malignant neoplasm of posterior wall of urinary bladder   6/2/2022 Cancer Staged    Staging form: Urinary Bladder, AJCC 8th Edition  - Clinical stage from 6/2/2022: Stage II (cT2, cN0, cM0) - Signed by Kev Cobb MD on 6/29/2022 6/29/2022 Initial Diagnosis    Malignant neoplasm of posterior wall of urinary bladder (HCC)     8/2/2022 Cancer Staged    Staging form: Urinary Bladder, AJCC 8th Edition  - Pathologic stage from 8/2/2022: Stage IIIA (pT3b, pN0, cM0) - Signed by Beverly Valladares MD on 8/25/2022 9/9/2022 - 7/28/2023 Chemotherapy    OP BLADDER Nivolumab 480 mg       8/11/2023 - 8/11/2023 Chemotherapy    OP BLADDER CISplatin D1,D8 / Gemcitabine D1,D8       8/18/2023 Biopsy    OP BLADDER CARBOplatin / Gemcitabine  Plan Provider: Beverly Valladares MD  Treatment goal: Palliative  Line of treatment: [No plan line of treatment]         REASON FOR VISIT: Management of my bladder cancer    HISTORY OF PRESENT ILLNESS:   67 y.o.  male presents today for follow-up of his bladder cancer.  Patient is concerned about PET scan.  He did read the report but has some questions on clarification.  He continues to have pelvic discomfort that his oxycodone 10 is not helping.  He does follow with Dr. Bosworth through the pain clinic.  He says that it has not  improved despite going up on his medicine.  He does continue to have burning pain from his hip to his penis.  He is interested in counseling at this time.           Past medical history, social history and family history was reviewed 08/25/23 and unchanged from prior visit.    Review of Systems:    Review of Systems   Constitutional: Negative.    HENT:  Negative.     Eyes: Negative.    Respiratory:  Positive for cough.    Cardiovascular: Negative.    Gastrointestinal: Negative.         Burning in his penis   Endocrine: Negative.    Genitourinary:  Positive for pelvic pain.    Musculoskeletal:  Positive for arthralgias, back pain, flank pain and myalgias.   Skin: Negative.    Neurological: Negative.    Hematological: Negative.    Psychiatric/Behavioral:  Positive for depression. The patient is nervous/anxious.           Medications:        Current Outpatient Medications:     oxyCODONE-acetaminophen (PERCOCET)  MG per tablet, Take 1 tablet by mouth Every 8 (Eight) Hours As Needed., Disp: , Rfl:     albuterol sulfate  (90 Base) MCG/ACT inhaler, Inhale 2 puffs Every 4 (Four) Hours As Needed for Wheezing., Disp: , Rfl:     Anoro Ellipta 62.5-25 MCG/INH aerosol powder  inhaler, Inhale 1 puff Daily., Disp: , Rfl:     atorvastatin (LIPITOR) 20 MG tablet, Take 1 tablet by mouth Daily., Disp: , Rfl:     esomeprazole (nexIUM) 40 MG capsule, , Disp: , Rfl:     famotidine (PEPCID) 40 MG tablet, Take 1 tablet by mouth 3 (Three) Times a Day., Disp: , Rfl:     fexofenadine (ALLEGRA) 180 MG tablet, Take 1 tablet by mouth Daily., Disp: , Rfl:     gabapentin (NEURONTIN) 600 MG tablet, Take 1 tablet by mouth 3 (Three) Times a Day., Disp: , Rfl:     losartan (COZAAR) 50 MG tablet, Take 1 tablet by mouth Daily., Disp: , Rfl:     montelukast (SINGULAIR) 10 MG tablet, Take 1 tablet by mouth Every Night., Disp: , Rfl:     multivitamin with minerals tablet tablet, Take 1 tablet by mouth Daily., Disp: , Rfl:      nicotine (NICODERM CQ) 14 MG/24HR patch, Place 1 patch on the skin as directed by provider Daily., Disp: , Rfl:     nystatin susp + lidocaine viscous (MAGIC MOUTHWASH) oral suspension, 5-10 ml swish and spit or swallow QID prn, Disp: 240 mL, Rfl: 3    OLANZapine (zyPREXA) 5 MG tablet, Take 1 tablet by mouth Every Night. Take on days 2, 3 and 4 after chemotherapy., Disp: 3 tablet, Rfl: 5    ondansetron (ZOFRAN) 8 MG tablet, Take 1 tablet by mouth 3 (Three) Times a Day As Needed for Nausea or Vomiting., Disp: 30 tablet, Rfl: 5    polyethylene glycol (MIRALAX) 17 g packet, Take 17 g by mouth Daily., Disp: 30 packet, Rfl: 0    promethazine (PHENERGAN) 25 MG tablet, Take 1 tablet by mouth Every 6 (Six) Hours As Needed for Nausea or Vomiting., Disp: 30 tablet, Rfl: 3    rivaroxaban (XARELTO) 20 MG tablet, Take 1 tablet by mouth Every Night., Disp: , Rfl:     tiZANidine (ZANAFLEX) 4 MG tablet, Take 1 tablet by mouth Every 8 (Eight) Hours As Needed., Disp: , Rfl:     verapamil ER (VERELAN) 240 MG 24 hr capsule, Take 1 capsule by mouth Every Night., Disp: , Rfl:   No current facility-administered medications for this visit.    Facility-Administered Medications Ordered in Other Visits:     dexAMETHasone (DECADRON) IVPB 12 mg, 12 mg, Intravenous, Once, Beverly Valladares MD, Last Rate: 200 mL/hr at 08/25/23 1454, 12 mg at 08/25/23 1454    gemcitabine (GEMZAR) 2,400 mg in sodium chloride 0.9 % 250 mL chemo IVPB, 1,000 mg/m2 (Treatment Plan Recorded), Intravenous, Once, Beverly Valladares MD    sodium chloride 0.9 % infusion 250 mL, 250 mL, Intravenous, Once, Beverly Valladares MD    Pain Medications               oxyCODONE-acetaminophen (PERCOCET)  MG per tablet Take 1 tablet by mouth Every 8 (Eight) Hours As Needed.    gabapentin (NEURONTIN) 600 MG tablet Take 1 tablet by mouth 3 (Three) Times a Day.    OLANZapine (zyPREXA) 5 MG tablet Take 1 tablet by mouth Every Night. Take on days 2, 3 and 4 after  "chemotherapy.    tiZANidine (ZANAFLEX) 4 MG tablet Take 1 tablet by mouth Every 8 (Eight) Hours As Needed.               ALLERGIES:    Allergies   Allergen Reactions    Fluticasone-Salmeterol Unknown - Low Severity     Thrush    Spiriva Respimat [Tiotropium Bromide Monohydrate] Unknown - Low Severity     Thrush    Tetanus Toxoids Swelling    Food Color Orange [Yellow Dye] Nausea Only    Naproxen Nausea Only    Orange Flavor GI Intolerance         Physical Exam    VITAL SIGNS:  /69   Pulse 88   Temp 97.5 øF (36.4 øC) (Temporal)   Resp 16   Ht 182.9 cm (72\")   Wt 117 kg (257 lb)   SpO2 98%   BMI 34.86 kg/mý                 Wt Readings from Last 3 Encounters:   08/25/23 117 kg (257 lb)   08/18/23 120 kg (263 lb 12.8 oz)   08/11/23 120 kg (264 lb)       Body mass index is 34.86 kg/mý. Body surface area is 2.37 meters squared.       Performance Status: 1    General: well appearing male in no acute distress  Neuro: alert and oriented  HEENT: sclera anicteric, oropharynx clear  Lymphatics: no cervical, supraclavicular, or axillary adenopathy  Cardiovascular: regular rate and rhythm, no murmurs  Lungs: clear to auscultation bilaterally  Abdomen: soft, nontender, nondistended.  No palpable organomegaly  Extremeties: no lower extremity edema  Skin: no rashes, lesions, bruising, or petechiae  Psych: mood and affect appropriate          RECENT LABS:    Lab Results   Component Value Date    HGB 11.7 (L) 08/25/2023    HCT 35.4 (L) 08/25/2023    MCV 82.7 08/25/2023     08/25/2023    WBC 4.58 08/25/2023    NEUTROABS 3.03 08/25/2023    LYMPHSABS 1.07 08/25/2023    MONOSABS 0.30 08/25/2023    EOSABS 0.09 08/25/2023    BASOSABS 0.07 08/25/2023       Lab Results   Component Value Date    GLUCOSE 108 (H) 08/18/2023    BUN 11 08/18/2023    CREATININE 1.50 (H) 08/18/2023     08/18/2023    K 4.6 08/18/2023     08/18/2023    CO2 23.6 08/18/2023    CALCIUM 9.9 08/18/2023    PROTEINTOT 6.9 08/18/2023    " ALBUMIN 4.0 08/18/2023    BILITOT 0.3 08/18/2023    ALKPHOS 88 08/18/2023    AST 18 08/18/2023    ALT 11 08/18/2023     CT Abdomen Pelvis Without Contrast    Result Date: 8/2/2023  Right pelvic soft  tissue mass as described above, right iliac and femoral adenopathy concerning for jacob metastasis. Findings are concerning for disease progression..     .   This study was performed with techniques to keep radiation doses as low as reasonably achievable (ALARA). Individualized dose reduction techniques using automated exposure control or adjustment of mA and/or kV according to the patient size were employed.     Images were reviewed, interpreted, and dictated by CHRISTA Rene Transcribed by Brandon Garcia PA-C.  This report was signed and finalized on 8/2/2023 7:30 PM by CHRISTA Castillo.      CT Chest Without Contrast Diagnostic    Result Date: 8/2/2023  No significant interval change.  No evidence of disease progression or recurrence to the chest..     This study was performed with techniques to keep radiation doses as low as reasonably achievable (ALARA). Individualized dose reduction techniques using automated exposure control or adjustment of mA and/or kV according to the patient size were employed.     Images were reviewed, interpreted, and dictated by CHRISTA Rene Transcribed by Brandon Garcia PA-C.  This report was signed and finalized on 8/2/2023 7:31 PM by CHRISTA Castillo.      NM PET/CT Skull Base to Mid Thigh 8/23/2023  Findings: Reference uptake values: Mediastinal, SUV max 1.82; liver, SUV max 2.72.  Right lower quadrant ileal conduit formation with right lower quadrant urostomy is again identified. And anterior lower abdominal wall hernia containing nonobstructed small bowel is present. Severe right renal atrophy is again noted. There is a poorly   defined soft tissue density surrounding the right external iliac artery along the right pelvic sidewall measuring 2.4 x 4.3 cm  and demonstrating an SUV max of 6.93. This lesion was present previously although appears slightly larger. SUV max in this   location on the prior study was 4.99.     There is normal distribution of radionuclide in the neck, chest, and abdomen.     IMPRESSION:  Soft tissue density of the right pelvic sidewall is suspicious for metastatic disease. Other chronic findings as detailed.      Assessment/Plan    1.  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  We will move forward with carbo and Gemzar day 8. We reviewed potential side effects of carboplatin and gemcitabine including myelosuppression, fatigue, nausea/vomiting, constipation, infusion reaction, and neutropenic fever.      2.  Severe lymphedema secondary to lymphadenectomy in the right leg.  Continue to follow-up with physical therapy .      3.  Burning sensation in the penis.  Likely due to recurrence.  We discussed scan shows soft tissue density of the right pelvic sidewall suspicious for metastatic disease.  We discussed this is likely the cause of his right hip pain that radiates to his penis.  We will continue with treatment.  I asked the patient to follow-up with Dr. Narayanan for pain management.      4.  Scheduled for knee replacement.  We will put off his knee replacement until he is better or stable.    5. Mouth sores.  Improved.  Continue Magic mouthwash as needed.      6. Treatment related nausea. Zofran does not help. I will refill phenergan.     Total time of patient care including time prior to, face to face with patient, and following visit spent in reviewing records, lab results, imaging studies, discussion with patient, and documentation/charting was > 42 minutes        ALLEN Crabtree  [facility]         Orders Placed This Encounter   Procedures    Ambulatory Referral to Psychiatry         8/25/2023

## 2023-08-25 NOTE — PROGRESS NOTES
PROBLEM LIST:  Oncology/Hematology History   Malignant neoplasm of posterior wall of urinary bladder   6/2/2022 Cancer Staged    Staging form: Urinary Bladder, AJCC 8th Edition  - Clinical stage from 6/2/2022: Stage II (cT2, cN0, cM0) - Signed by Kev Cobb MD on 6/29/2022 6/29/2022 Initial Diagnosis    Malignant neoplasm of posterior wall of urinary bladder (HCC)     8/2/2022 Cancer Staged    Staging form: Urinary Bladder, AJCC 8th Edition  - Pathologic stage from 8/2/2022: Stage IIIA (pT3b, pN0, cM0) - Signed by Beverly Valladares MD on 8/25/2022 9/9/2022 - 7/28/2023 Chemotherapy    OP BLADDER Nivolumab 480 mg       8/11/2023 - 8/11/2023 Chemotherapy    OP BLADDER CISplatin D1,D8 / Gemcitabine D1,D8       8/18/2023 Biopsy    OP BLADDER CARBOplatin / Gemcitabine  Plan Provider: Beverly Valladares MD  Treatment goal: Palliative  Line of treatment: [No plan line of treatment]         REASON FOR VISIT: Management of my bladder cancer    HISTORY OF PRESENT ILLNESS:   67 y.o.  male presents today for follow-up of his bladder cancer.  Patient is concerned about PET scan.  He did read the report but has some questions on clarification.  He continues to have pelvic discomfort that his oxycodone 10 is not helping.  He does follow with Dr. Bosworth through the pain clinic.  He says that it has not improved despite going up on his medicine.  He does continue to have burning pain from his hip to his penis.  He is interested in counseling at this time.           Past medical history, social history and family history was reviewed 08/25/23 and unchanged from prior visit.    Review of Systems:    Review of Systems   Constitutional: Negative.    HENT:  Negative.     Eyes: Negative.    Respiratory:  Positive for cough.    Cardiovascular: Negative.    Gastrointestinal: Negative.         Burning in his penis   Endocrine: Negative.    Genitourinary:  Positive for pelvic pain.    Musculoskeletal:  Positive  for arthralgias, back pain, flank pain and myalgias.   Skin: Negative.    Neurological: Negative.    Hematological: Negative.    Psychiatric/Behavioral:  Positive for depression. The patient is nervous/anxious.           Medications:        Current Outpatient Medications:     oxyCODONE-acetaminophen (PERCOCET)  MG per tablet, Take 1 tablet by mouth Every 8 (Eight) Hours As Needed., Disp: , Rfl:     albuterol sulfate  (90 Base) MCG/ACT inhaler, Inhale 2 puffs Every 4 (Four) Hours As Needed for Wheezing., Disp: , Rfl:     Anoro Ellipta 62.5-25 MCG/INH aerosol powder  inhaler, Inhale 1 puff Daily., Disp: , Rfl:     atorvastatin (LIPITOR) 20 MG tablet, Take 1 tablet by mouth Daily., Disp: , Rfl:     esomeprazole (nexIUM) 40 MG capsule, , Disp: , Rfl:     famotidine (PEPCID) 40 MG tablet, Take 1 tablet by mouth 3 (Three) Times a Day., Disp: , Rfl:     fexofenadine (ALLEGRA) 180 MG tablet, Take 1 tablet by mouth Daily., Disp: , Rfl:     gabapentin (NEURONTIN) 600 MG tablet, Take 1 tablet by mouth 3 (Three) Times a Day., Disp: , Rfl:     losartan (COZAAR) 50 MG tablet, Take 1 tablet by mouth Daily., Disp: , Rfl:     montelukast (SINGULAIR) 10 MG tablet, Take 1 tablet by mouth Every Night., Disp: , Rfl:     multivitamin with minerals tablet tablet, Take 1 tablet by mouth Daily., Disp: , Rfl:     nicotine (NICODERM CQ) 14 MG/24HR patch, Place 1 patch on the skin as directed by provider Daily., Disp: , Rfl:     nystatin susp + lidocaine viscous (MAGIC MOUTHWASH) oral suspension, 5-10 ml swish and spit or swallow QID prn, Disp: 240 mL, Rfl: 3    OLANZapine (zyPREXA) 5 MG tablet, Take 1 tablet by mouth Every Night. Take on days 2, 3 and 4 after chemotherapy., Disp: 3 tablet, Rfl: 5    ondansetron (ZOFRAN) 8 MG tablet, Take 1 tablet by mouth 3 (Three) Times a Day As Needed for Nausea or Vomiting., Disp: 30 tablet, Rfl: 5    polyethylene glycol (MIRALAX) 17 g packet, Take 17 g by mouth Daily., Disp: 30 packet, Rfl:  "0    promethazine (PHENERGAN) 25 MG tablet, Take 1 tablet by mouth Every 6 (Six) Hours As Needed for Nausea or Vomiting., Disp: 30 tablet, Rfl: 3    rivaroxaban (XARELTO) 20 MG tablet, Take 1 tablet by mouth Every Night., Disp: , Rfl:     tiZANidine (ZANAFLEX) 4 MG tablet, Take 1 tablet by mouth Every 8 (Eight) Hours As Needed., Disp: , Rfl:     verapamil ER (VERELAN) 240 MG 24 hr capsule, Take 1 capsule by mouth Every Night., Disp: , Rfl:   No current facility-administered medications for this visit.    Facility-Administered Medications Ordered in Other Visits:     dexAMETHasone (DECADRON) IVPB 12 mg, 12 mg, Intravenous, Once, Beverly Valladares MD, Last Rate: 200 mL/hr at 08/25/23 1454, 12 mg at 08/25/23 1454    gemcitabine (GEMZAR) 2,400 mg in sodium chloride 0.9 % 250 mL chemo IVPB, 1,000 mg/m2 (Treatment Plan Recorded), Intravenous, Once, Beverly Valladares MD    sodium chloride 0.9 % infusion 250 mL, 250 mL, Intravenous, Once, Beverly Valladares MD    Pain Medications               oxyCODONE-acetaminophen (PERCOCET)  MG per tablet Take 1 tablet by mouth Every 8 (Eight) Hours As Needed.    gabapentin (NEURONTIN) 600 MG tablet Take 1 tablet by mouth 3 (Three) Times a Day.    OLANZapine (zyPREXA) 5 MG tablet Take 1 tablet by mouth Every Night. Take on days 2, 3 and 4 after chemotherapy.    tiZANidine (ZANAFLEX) 4 MG tablet Take 1 tablet by mouth Every 8 (Eight) Hours As Needed.               ALLERGIES:    Allergies   Allergen Reactions    Fluticasone-Salmeterol Unknown - Low Severity     Thrush    Spiriva Respimat [Tiotropium Bromide Monohydrate] Unknown - Low Severity     Thrush    Tetanus Toxoids Swelling    Food Color Orange [Yellow Dye] Nausea Only    Naproxen Nausea Only    Orange Flavor GI Intolerance         Physical Exam    VITAL SIGNS:  /69   Pulse 88   Temp 97.5 øF (36.4 øC) (Temporal)   Resp 16   Ht 182.9 cm (72\")   Wt 117 kg (257 lb)   SpO2 98%   BMI 34.86 kg/mý             "     Wt Readings from Last 3 Encounters:   08/25/23 117 kg (257 lb)   08/18/23 120 kg (263 lb 12.8 oz)   08/11/23 120 kg (264 lb)       Body mass index is 34.86 kg/mý. Body surface area is 2.37 meters squared.       Performance Status: 1    General: well appearing male in no acute distress  Neuro: alert and oriented  HEENT: sclera anicteric, oropharynx clear  Lymphatics: no cervical, supraclavicular, or axillary adenopathy  Cardiovascular: regular rate and rhythm, no murmurs  Lungs: clear to auscultation bilaterally  Abdomen: soft, nontender, nondistended.  No palpable organomegaly  Extremeties: no lower extremity edema  Skin: no rashes, lesions, bruising, or petechiae  Psych: mood and affect appropriate          RECENT LABS:    Lab Results   Component Value Date    HGB 11.7 (L) 08/25/2023    HCT 35.4 (L) 08/25/2023    MCV 82.7 08/25/2023     08/25/2023    WBC 4.58 08/25/2023    NEUTROABS 3.03 08/25/2023    LYMPHSABS 1.07 08/25/2023    MONOSABS 0.30 08/25/2023    EOSABS 0.09 08/25/2023    BASOSABS 0.07 08/25/2023       Lab Results   Component Value Date    GLUCOSE 108 (H) 08/18/2023    BUN 11 08/18/2023    CREATININE 1.50 (H) 08/18/2023     08/18/2023    K 4.6 08/18/2023     08/18/2023    CO2 23.6 08/18/2023    CALCIUM 9.9 08/18/2023    PROTEINTOT 6.9 08/18/2023    ALBUMIN 4.0 08/18/2023    BILITOT 0.3 08/18/2023    ALKPHOS 88 08/18/2023    AST 18 08/18/2023    ALT 11 08/18/2023     CT Abdomen Pelvis Without Contrast    Result Date: 8/2/2023  Right pelvic soft  tissue mass as described above, right iliac and femoral adenopathy concerning for jacob metastasis. Findings are concerning for disease progression..     .   This study was performed with techniques to keep radiation doses as low as reasonably achievable (ALARA). Individualized dose reduction techniques using automated exposure control or adjustment of mA and/or kV according to the patient size were employed.     Images were reviewed,  interpreted, and dictated by CHRISTA Rene Transcribed by Brandon Garcia PA-C.  This report was signed and finalized on 8/2/2023 7:30 PM by CHRISTA Castillo.      CT Chest Without Contrast Diagnostic    Result Date: 8/2/2023  No significant interval change.  No evidence of disease progression or recurrence to the chest..     This study was performed with techniques to keep radiation doses as low as reasonably achievable (ALARA). Individualized dose reduction techniques using automated exposure control or adjustment of mA and/or kV according to the patient size were employed.     Images were reviewed, interpreted, and dictated by CHRISTA Rene Transcribed by Brandon Garcia PA-C.  This report was signed and finalized on 8/2/2023 7:31 PM by CHRISTA Castillo.      NM PET/CT Skull Base to Mid Thigh 8/23/2023  Findings: Reference uptake values: Mediastinal, SUV max 1.82; liver, SUV max 2.72.  Right lower quadrant ileal conduit formation with right lower quadrant urostomy is again identified. And anterior lower abdominal wall hernia containing nonobstructed small bowel is present. Severe right renal atrophy is again noted. There is a poorly   defined soft tissue density surrounding the right external iliac artery along the right pelvic sidewall measuring 2.4 x 4.3 cm and demonstrating an SUV max of 6.93. This lesion was present previously although appears slightly larger. SUV max in this   location on the prior study was 4.99.     There is normal distribution of radionuclide in the neck, chest, and abdomen.     IMPRESSION:  Soft tissue density of the right pelvic sidewall is suspicious for metastatic disease. Other chronic findings as detailed.      Assessment/Plan    1.  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  We will move forward with carbo and Gemzar day 8. We reviewed potential side effects of carboplatin and gemcitabine including myelosuppression, fatigue,  nausea/vomiting, constipation, infusion reaction, and neutropenic fever.      2.  Severe lymphedema secondary to lymphadenectomy in the right leg.  Continue to follow-up with physical therapy .      3.  Burning sensation in the penis.  Likely due to recurrence.  We discussed scan shows soft tissue density of the right pelvic sidewall suspicious for metastatic disease.  We discussed this is likely the cause of his right hip pain that radiates to his penis.  We will continue with treatment.  I asked the patient to follow-up with Dr. Narayanan for pain management.      4.  Scheduled for knee replacement.  We will put off his knee replacement until he is better or stable.    5. Mouth sores.  Improved.  Continue Magic mouthwash as needed.      6. Treatment related nausea. Zofran does not help. I will refill phenergan.     Total time of patient care including time prior to, face to face with patient, and following visit spent in reviewing records, lab results, imaging studies, discussion with patient, and documentation/charting was > 42 minutes        ALLEN Crabtree  [facility]         Orders Placed This Encounter   Procedures    Ambulatory Referral to Psychiatry         8/25/2023

## 2023-09-01 ENCOUNTER — OFFICE VISIT (OUTPATIENT)
Dept: ONCOLOGY | Facility: CLINIC | Age: 68
End: 2023-09-01
Payer: COMMERCIAL

## 2023-09-01 VITALS
RESPIRATION RATE: 16 BRPM | HEIGHT: 72 IN | BODY MASS INDEX: 35.49 KG/M2 | OXYGEN SATURATION: 98 % | WEIGHT: 262 LBS | SYSTOLIC BLOOD PRESSURE: 156 MMHG | HEART RATE: 89 BPM | DIASTOLIC BLOOD PRESSURE: 77 MMHG | TEMPERATURE: 97.3 F

## 2023-09-01 DIAGNOSIS — C67.4 MALIGNANT NEOPLASM OF POSTERIOR WALL OF URINARY BLADDER: Primary | ICD-10-CM

## 2023-09-01 RX ORDER — SODIUM CHLORIDE 9 MG/ML
250 INJECTION, SOLUTION INTRAVENOUS ONCE
OUTPATIENT
Start: 2023-09-08

## 2023-09-01 RX ORDER — SODIUM CHLORIDE 9 MG/ML
250 INJECTION, SOLUTION INTRAVENOUS ONCE
OUTPATIENT
Start: 2023-09-15

## 2023-09-01 RX ORDER — METOCLOPRAMIDE 10 MG/1
TABLET ORAL
COMMUNITY
Start: 2023-08-28

## 2023-09-01 RX ORDER — FAMOTIDINE 10 MG/ML
20 INJECTION, SOLUTION INTRAVENOUS AS NEEDED
OUTPATIENT
Start: 2023-09-08

## 2023-09-01 RX ORDER — PALONOSETRON 0.05 MG/ML
0.25 INJECTION, SOLUTION INTRAVENOUS ONCE
OUTPATIENT
Start: 2023-09-08

## 2023-09-01 RX ORDER — DIPHENHYDRAMINE HYDROCHLORIDE 50 MG/ML
50 INJECTION INTRAMUSCULAR; INTRAVENOUS AS NEEDED
OUTPATIENT
Start: 2023-09-08

## 2023-09-01 RX ORDER — OLANZAPINE 5 MG/1
5 TABLET ORAL ONCE
OUTPATIENT
Start: 2023-09-08 | End: 2023-09-08

## 2023-09-01 NOTE — PROGRESS NOTES
PROBLEM LIST:  Oncology/Hematology History   Malignant neoplasm of posterior wall of urinary bladder   6/2/2022 Cancer Staged    Staging form: Urinary Bladder, AJCC 8th Edition  - Clinical stage from 6/2/2022: Stage II (cT2, cN0, cM0) - Signed by Kev Cobb MD on 6/29/2022 6/29/2022 Initial Diagnosis    Malignant neoplasm of posterior wall of urinary bladder (HCC)     8/2/2022 Cancer Staged    Staging form: Urinary Bladder, AJCC 8th Edition  - Pathologic stage from 8/2/2022: Stage IIIA (pT3b, pN0, cM0) - Signed by Beverly Valladares MD on 8/25/2022 9/9/2022 - 7/28/2023 Chemotherapy    Received adjuvant nivolumab due to renal dysfunction limiting us from being able to do cisplatin  OP BLADDER Nivolumab 480 mg     8/11/2023 - 8/11/2023 Chemotherapy    OP BLADDER Carboplatin D1,D8 / Gemcitabine D1,D8       8/18/2023 Biopsy    OP BLADDER CARBOplatin / Gemcitabine  Plan Provider: Beverly Valladares MD  Treatment goal: Palliative  Line of treatment: [No plan line of treatment]      Molecular Testing    Molecular testing -Sandra             REASON FOR VISIT: Management of my bladder cancer    HISTORY OF PRESENT ILLNESS:   67 y.o.  male presents today for follow-up of his bladder cancer.  He has been concerned about his treatment as well as his cancer.  He is wondering about his staging and exactly what his cancer is called. He says his family would like for him to have a second opinion.  He continues to have pelvic discomfort that his oxycodone 4 times a day is not helping.  It starts in hip and radiates down through his penis down to his upper thigh.  He is using heat to see if that can help.  Dr. Bosworth would like for us to manage his pain as this is cancer pain he says.  He continues to be interested in counseling at this time.                 Past medical history, social history and family history was reviewed 09/01/23 and unchanged from prior visit.    Review of Systems:    Review of  Systems   Constitutional: Negative.    HENT:  Negative.     Eyes: Negative.    Respiratory:  Positive for cough.    Cardiovascular: Negative.    Gastrointestinal: Negative.         Burning in his penis   Endocrine: Negative.    Genitourinary:  Positive for pelvic pain.    Musculoskeletal:  Positive for arthralgias, back pain, flank pain and myalgias.   Skin: Negative.    Neurological: Negative.    Hematological: Negative.    Psychiatric/Behavioral:  Positive for depression. The patient is nervous/anxious.           Medications:        Current Outpatient Medications:     metoclopramide (REGLAN) 10 MG tablet, , Disp: , Rfl:     albuterol sulfate  (90 Base) MCG/ACT inhaler, Inhale 2 puffs Every 4 (Four) Hours As Needed for Wheezing., Disp: , Rfl:     Anoro Ellipta 62.5-25 MCG/INH aerosol powder  inhaler, Inhale 1 puff Daily., Disp: , Rfl:     atorvastatin (LIPITOR) 20 MG tablet, Take 1 tablet by mouth Daily., Disp: , Rfl:     esomeprazole (nexIUM) 40 MG capsule, , Disp: , Rfl:     famotidine (PEPCID) 40 MG tablet, Take 1 tablet by mouth 3 (Three) Times a Day., Disp: , Rfl:     fexofenadine (ALLEGRA) 180 MG tablet, Take 1 tablet by mouth Daily., Disp: , Rfl:     gabapentin (NEURONTIN) 600 MG tablet, Take 1 tablet by mouth 3 (Three) Times a Day., Disp: , Rfl:     losartan (COZAAR) 50 MG tablet, Take 1 tablet by mouth Daily., Disp: , Rfl:     montelukast (SINGULAIR) 10 MG tablet, Take 1 tablet by mouth Every Night., Disp: , Rfl:     multivitamin with minerals tablet tablet, Take 1 tablet by mouth Daily., Disp: , Rfl:     nicotine (NICODERM CQ) 14 MG/24HR patch, Place 1 patch on the skin as directed by provider Daily., Disp: , Rfl:     nystatin susp + lidocaine viscous (MAGIC MOUTHWASH) oral suspension, 5-10 ml swish and spit or swallow QID prn, Disp: 240 mL, Rfl: 3    OLANZapine (zyPREXA) 5 MG tablet, Take 1 tablet by mouth Every Night. Take on days 2, 3 and 4 after chemotherapy., Disp: 3 tablet, Rfl: 5     "ondansetron (ZOFRAN) 8 MG tablet, Take 1 tablet by mouth 3 (Three) Times a Day As Needed for Nausea or Vomiting., Disp: 30 tablet, Rfl: 5    oxyCODONE-acetaminophen (PERCOCET)  MG per tablet, Take 1 tablet by mouth Every 8 (Eight) Hours As Needed., Disp: , Rfl:     polyethylene glycol (MIRALAX) 17 g packet, Take 17 g by mouth Daily., Disp: 30 packet, Rfl: 0    promethazine (PHENERGAN) 25 MG tablet, Take 1 tablet by mouth Every 6 (Six) Hours As Needed for Nausea or Vomiting., Disp: 30 tablet, Rfl: 3    rivaroxaban (XARELTO) 20 MG tablet, Take 1 tablet by mouth Every Night., Disp: , Rfl:     tiZANidine (ZANAFLEX) 4 MG tablet, Take 1 tablet by mouth Every 8 (Eight) Hours As Needed., Disp: , Rfl:     verapamil ER (VERELAN) 240 MG 24 hr capsule, Take 1 capsule by mouth Every Night., Disp: , Rfl:     Pain Medications               gabapentin (NEURONTIN) 600 MG tablet Take 1 tablet by mouth 3 (Three) Times a Day.    OLANZapine (zyPREXA) 5 MG tablet Take 1 tablet by mouth Every Night. Take on days 2, 3 and 4 after chemotherapy.    oxyCODONE-acetaminophen (PERCOCET)  MG per tablet Take 1 tablet by mouth Every 8 (Eight) Hours As Needed.    tiZANidine (ZANAFLEX) 4 MG tablet Take 1 tablet by mouth Every 8 (Eight) Hours As Needed.               ALLERGIES:    Allergies   Allergen Reactions    Fluticasone-Salmeterol Unknown - Low Severity     Thrush    Spiriva Respimat [Tiotropium Bromide Monohydrate] Unknown - Low Severity     Thrush    Tetanus Toxoids Swelling    Food Color Orange [Yellow Dye] Nausea Only    Naproxen Nausea Only    Orange Flavor GI Intolerance         Physical Exam    VITAL SIGNS:  /77   Pulse 89   Temp 97.3 øF (36.3 øC) (Temporal)   Resp 16   Ht 182.9 cm (72\")   Wt 119 kg (262 lb)   SpO2 98%   BMI 35.53 kg/mý                 Wt Readings from Last 3 Encounters:   09/01/23 119 kg (262 lb)   08/25/23 117 kg (257 lb)   08/18/23 120 kg (263 lb 12.8 oz)       Body mass index is 35.53 kg/mý. " Body surface area is 2.39 meters squared.       Performance Status: 1    General: well appearing male in no acute distress  Neuro: alert and oriented  HEENT: sclera anicteric, oropharynx clear  Lymphatics: no cervical, supraclavicular, or axillary adenopathy  Cardiovascular: regular rate and rhythm, no murmurs  Lungs: clear to auscultation bilaterally  Abdomen: soft, nontender, nondistended.  No palpable organomegaly  Extremeties: no lower extremity edema  Skin: no rashes, lesions, bruising, or petechiae  Psych: mood and affect appropriate            RECENT LABS:    Lab Results   Component Value Date    HGB 11.7 (L) 08/25/2023    HCT 35.4 (L) 08/25/2023    MCV 82.7 08/25/2023     08/25/2023    WBC 4.58 08/25/2023    NEUTROABS 3.03 08/25/2023    LYMPHSABS 1.07 08/25/2023    MONOSABS 0.30 08/25/2023    EOSABS 0.09 08/25/2023    BASOSABS 0.07 08/25/2023       Lab Results   Component Value Date    GLUCOSE 108 (H) 08/18/2023    BUN 11 08/18/2023    CREATININE 1.50 (H) 08/18/2023     08/18/2023    K 4.6 08/18/2023     08/18/2023    CO2 23.6 08/18/2023    CALCIUM 9.9 08/18/2023    PROTEINTOT 6.9 08/18/2023    ALBUMIN 4.0 08/18/2023    BILITOT 0.3 08/18/2023    ALKPHOS 88 08/18/2023    AST 18 08/18/2023    ALT 11 08/18/2023     CT Abdomen Pelvis Without Contrast    Result Date: 8/2/2023  Right pelvic soft  tissue mass as described above, right iliac and femoral adenopathy concerning for jacob metastasis. Findings are concerning for disease progression..     .   This study was performed with techniques to keep radiation doses as low as reasonably achievable (ALARA). Individualized dose reduction techniques using automated exposure control or adjustment of mA and/or kV according to the patient size were employed.     Images were reviewed, interpreted, and dictated by CHRISTA Rene Transcribed by Brandon Garcia, PA-C.  This report was signed and finalized on 8/2/2023 7:30 PM by CHRISTA Castillo.       CT Chest Without Contrast Diagnostic    Result Date: 8/2/2023  No significant interval change.  No evidence of disease progression or recurrence to the chest..     This study was performed with techniques to keep radiation doses as low as reasonably achievable (ALARA). Individualized dose reduction techniques using automated exposure control or adjustment of mA and/or kV according to the patient size were employed.     Images were reviewed, interpreted, and dictated by CHRISTA Rene Transcribed by Brandon Garcia PA-C.  This report was signed and finalized on 8/2/2023 7:31 PM by CHRISTA Castillo.      NM PET/CT Skull Base to Mid Thigh 8/23/2023  Findings: Reference uptake values: Mediastinal, SUV max 1.82; liver, SUV max 2.72.  Right lower quadrant ileal conduit formation with right lower quadrant urostomy is again identified. And anterior lower abdominal wall hernia containing nonobstructed small bowel is present. Severe right renal atrophy is again noted. There is a poorly   defined soft tissue density surrounding the right external iliac artery along the right pelvic sidewall measuring 2.4 x 4.3 cm and demonstrating an SUV max of 6.93. This lesion was present previously although appears slightly larger. SUV max in this   location on the prior study was 4.99.     There is normal distribution of radionuclide in the neck, chest, and abdomen.     IMPRESSION:  Soft tissue density of the right pelvic sidewall is suspicious for metastatic disease. Other chronic findings as detailed.      Assessment/Plan    1.  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 Unfortunately he had interval changes in his pelvis on CAT scan which was done without contrast due to kidney dysfunction in August 2023.  PET scan was ordered and completed August 22, 2023.  Unfortunately PET scan shows soft tissue density of the right pelvic sidewall suspicious for metastatic disease.There is a poorly  defined soft tissue density surrounding the right external iliac artery along the right pelvic sidewall measuring 2.4 x 4.3 cm and demonstrating an SUV max of 6.93.    We will move forward with carbo and Gemzar cycle 2 day 1.  We will plan for scans after cycle #3.  Overall, he tolerated first cycle fairly well.  He noticed some nausea and heaviness in his stomach.  His appetite has been decreased.  He is using Reglan and that has seemed to help.  Weight is stable.  We reviewed potential side effects of carboplatin and gemcitabine including myelosuppression, fatigue, nausea/vomiting, constipation, infusion reaction, and neutropenic fever.    2.  Severe lymphedema secondary to lymphadenectomy in the right leg.  Continue to follow-up with physical therapy .      3.  Burning sensation in the penis.  Likely due to recurrence. Scan showed soft tissue density of the right pelvic sidewall.  We discussed this is likely the cause. Dr. Narayanan would like for us to take over pain management.  I will refer to palliative care today.  For now he will continue with Percocet 4 times daily.    4.  Scheduled for knee replacement.  We will continue to put off his knee replacement until he is better or stable.    5. Mouth sores.  Improved.  Continue Magic mouthwash as needed.      6. Treatment related nausea. Zofran does not help.  He has Phenergan to be used as needed.  Other provider gave him Reglan.  We discussed to try not to use Phenergan and Reglan together.    7.  Heartburn.  He has Pepcid 40 mg daily.  We discussed that we can try other medications.  He says he has tried Prilosec and Protonix and that did not help.  Nexium and Pepcid is only thing that is helping.    8.  Depression and anxiety.  A referral was placed at last visit to our psychiatric nurse practitioner.  I will follow-up to see why this has not been scheduled at this point.  We will attempt to get patient in the next 2 weeks          Mesha Billings,  ALLEN  [facility]    Return in (Approximately): 3 weeks    Orders Placed This Encounter   Procedures    Comprehensive metabolic panel    Ambulatory Referral to Palliative Care    CBC and Differential    CBC and Differential         9/1/2023       Total time of patient care including time prior to, face to face with patient, and following visit spent in reviewing records, lab results, imaging studies, discussion with patient, and documentation/charting was > 45 minutes

## 2023-09-05 ENCOUNTER — TELEPHONE (OUTPATIENT)
Dept: ONCOLOGY | Facility: CLINIC | Age: 68
End: 2023-09-05
Payer: COMMERCIAL

## 2023-09-05 NOTE — TELEPHONE ENCOUNTER
Returned call to patient at this time patient requesting medication. Discussing with him Dr. Skinner is out today and will be back in am. Patient stating I only need enough pain medication until I get in Pallative care on Friday. Patient reporting he is taking Percocet 10 mg every 4 hours.

## 2023-09-06 DIAGNOSIS — C67.4 MALIGNANT NEOPLASM OF POSTERIOR WALL OF URINARY BLADDER: Primary | ICD-10-CM

## 2023-09-06 RX ORDER — OXYCODONE AND ACETAMINOPHEN 10; 325 MG/1; MG/1
1 TABLET ORAL EVERY 8 HOURS PRN
Qty: 24 TABLET | Refills: 0 | Status: SHIPPED | OUTPATIENT
Start: 2023-09-06 | End: 2023-09-08 | Stop reason: SDUPTHER

## 2023-09-07 NOTE — PROGRESS NOTES
Palliative Clinic Note      Name: Tru Buitrago  Age: 67 y.o.  Sex: male  : 1955  MRN: [id number]  Date of Service: 2023   Referring Physician: Mesha Billings APRN  Medical Oncologist: Dr. Valladares    Subjective:    Chief Complaint: RLE pain, dyspepsia, insomnia     History of Present Illness: Tru Buitrago is a 67 y.o. male with past medical history significant for HTN, HLD, COPD, GERD, bladder cancer who presents to the palliative clinic today to establish care.     Treatment summary: The patient presented with c/o hematuria in 2022. Biopsy was consistent with invasive urothelial carcinoma. Patient underwent a cystectomy in 2022. Patient started on chemotherapy in 2022. Therapy changed to cisplatin and gemcitabine in 2023 due to progression in the pelvis. Patient is in the process of transferring his cancer care to Dr. Goss at the [facility].    Pain: The patient reports long history of chronic back pain managed by pain specialist, Dr. Narayanan. The patient complains of worsening pain in his right hip, right groin and right leg extending down into his right knee secondary to progression of cancer. The patient is currently prescribed Percocet 10/325 mg tablets every 6 hours PRN and gabapentin 600 mg TID per pain specialist. He is also prescribed Zanaflex 6 mg TID per primary care provider. The pain is constant and still limiting his daily function and interfering with restful sleep. The patient's pain specialist requested palliative care take over management of pain medicine.     Other symptoms: The patient complains of dyspepsia 20-30 minutes after meals. Certain foods exacerbated his symptoms, however he is experiencing it with all food for the past 4 weeks. He reports similar symptoms in the past with a diagnosis of PUD. He has not seen a GI specialist for this problem and no recent scope. He has tried and failed several antiemetics including  Zofran, Phenergan, and Reglan. He currently taking Nexium 40 mg daily and Pepcid 40 mg daily. He has tried and failed Carafate, Pepto Bismul and Tums. The patient also complains of trouble staying asleep during the night. He has not tried any OTC sleep aids. Patient denies issues with bowels. He is taking a daily stool softener.     Pyschosocial: The patient presents to the clinic alone. He lives with his significant other, Warren Snow. 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 He smokes 1 PPD. He drinks alcohol occasionally. No illicit drug use. No personal history of mental illness. Patient reports a stable mood today.     Goals: Maximize comfort, optimize function & psychosocial wellbeing, and promote advanced care planning.    The following portions of the patient's history were reviewed and updated as appropriate: allergies, current medications, past family history, past medical history, past social history, past surgical history and problem list.    Decisional capacity:Full  ORT-R: Low risk  PHQ-9: 1-4 (Minimal Depression)  NORM: 4  ECOG: (2) Ambulatory and capable of self care, unable to carry out work activity, up and about > 50% or waking hours   Palliative Performance Scale Score: 70%     Objective:    /59   Pulse 90   Temp 97.3 °F (36.3 °C) (Temporal)   Resp 20   Wt 119 kg (262 lb)   SpO2 96%   BMI 35.53 kg/m²     Constitutional: Awake, alert, uses cane to ambulate, sitting up in exam chair, in no acute distress  Eyes: PERRLA, EOMS intact  HENT: NCAT, face symmetric  Neck: Supple, trachea midline  Respiratory: Nonlabored respirations  Cardiovascular: RRR, no edema observed  Gastrointestinal: Soft, no guarding  Musculoskeletal: Moves all extremities   Psychiatric: Appropriate affect, cooperative  Neurologic: Oriented x 3, Cranial Nerves grossly intact to confrontation, speech clear  Skin: Cool dry, no rashes or wounds appreciated     Medication Counts: Instructed to bring controlled medications to  all appointments.   I have reviewed the patient's [place] PDMP. BERNADETTE Req #[id number].   UDS: Collected today. Results were appropriate.    Assessment & Plan:    1. Malignant neoplasm of posterior wall of urinary bladder  --We explained what palliative care is and what it can offer the patient. Reinforced that palliative care is provided in collaboration with primary care provider and any specialty care providers.  Encouraged patient to continue to seek emergency medical treatment as needed for acute illness or injury. We discussed short-term goals which include improving quality of life and daily functioning.     --Therapy changed to cisplatin and gemcitabine in 8/2023 due to progression in the pelvis. Patient is in the process of transferring his cancer care to Dr. Goss at the [facility].    2. Cancer related pain  --We discussed goals for pain relief, risks associated with opioid therapy, proper use, safe storage and disposal of opioids. We recommended the patient be supervised for the first few days when starting a new medication or dose and always start the new medication or dose in the morning. Plan to review our universal surveillance strategies including urine drug screens, BERNADETTE reports, pill counts and risk assessment screenings at his next appointment. Plan to sign The Consent for Treatment with Controlled Substances and Controlled Substance Prescribing Agreement were both reviewed, signed, and scanned into the chart.     --Patient is appropriate for opioid therapy due to cancer related pain. Will increase Percocet 10/325 mg to every 4 hours as needed for pain. Continue gabapentin 600 mg TID and Zanaflex 6 mg TID.  Side effects of the medication discussed at today's visit. Patient was encouraged to continue bowel regimen of daily stool softeners, prn laxatives, and diet modifications.    3. Therapeutic drug monitoring  --Urine Drug Screen collected today. Results were appropriate.     4.  Dyspepsia  --Recommend patient discuss increasing esomeprazole (Nexium) 40 mg to twice daily with his oncologist as well as referral to GI specialist. Will also try dicyclomine (BENTYL) 10 MG capsule; Take 1 capsule by mouth 4 (Four) Times a Day Before Meals & at Bedtime.      5. Insomnia, unspecified type  --Discussed sleep hygiene strategies. Recommend patient try OTC Tylenol-PM or melatonin nightly.     Code status: FULL   Advanced directives: No.    Return in about 7 weeks (around 10/27/2023) for Office Visit.    I spent 60 minutes caring for Tru Buitrago on this date of service. This time includes time spent by me in the following activities: preparing for the visit, reviewing tests, obtaining and/or reviewing a separately obtained history, performing a medically appropriate examination and/or evaluation , counseling and educating the patient/family/caregiver, ordering medications, tests, or procedures, documenting information in the medical record, independently interpreting results and communicating that information with the patient/family/caregiver, and care coordination    Lucero Ontiveros PA-C  09/08/2023    Medication Date Filled # Filled Count Used # Days  MASOUD   Gabapentin 600 8/16/23 84 -- -- -- --   Percocet 10/325 8/12/23 112 -- -- -- --   Norco 10/325 7/19/23 84 -- -- -- --

## 2023-09-07 NOTE — PATIENT INSTRUCTIONS
Check-out instructions:  Increase Percocet 10/325 mg tablets every 4 hours as needed for cancer pain.  Continue gabapentin 600 mg three times daily for neuropathy.  Start Bentyl 10 mg 4x daily as needed for abdominal pain after meals.  Discuss increasing Nexium to 40 mg twice daily with oncologist.   Start melatonin 10 mg nightly and/or Tylenol-PM / Advil -PM for insomnia.   Scheduled to follow up on 10/27/23    Medication Policy: We ask that you bring all of the medications prescribed by this clinic to every appointment. For telemedicine appointments, be prepared to give medication counts. This will assist us with managing your refill needs.      Refill Policy: You must notify us at least 3-5 business days in advance for routine refill requests. Call [phone] or send [employer] message to the Palliative Pool. Some prescriptions will need to be signed by the physician and will take longer to be sent to the pharmacy. Please also be aware of additional insurance prior authorization processing time required for many medications. Try to communicate with your pharmacy first to look for scripts signed in advance.     Communication: The [facility] days are Monday-Friday 8:30-4:30 PM. Call [phone] or send [employer] message to the Palliative Pool. You will not be routed to speak directly to the palliative provider during clinic hours, so that we may provide the best care and attention to our patients in the office. If you require immediate communication, please also consider contacting your primary care office or appropriate specialist office.

## 2023-09-08 ENCOUNTER — INFUSION (OUTPATIENT)
Dept: ONCOLOGY | Facility: HOSPITAL | Age: 68
End: 2023-09-08
Payer: COMMERCIAL

## 2023-09-08 ENCOUNTER — OFFICE VISIT (OUTPATIENT)
Dept: PALLIATIVE CARE | Facility: CLINIC | Age: 68
End: 2023-09-08
Payer: COMMERCIAL

## 2023-09-08 VITALS
DIASTOLIC BLOOD PRESSURE: 59 MMHG | RESPIRATION RATE: 20 BRPM | TEMPERATURE: 97.3 F | SYSTOLIC BLOOD PRESSURE: 101 MMHG | OXYGEN SATURATION: 96 % | BODY MASS INDEX: 35.53 KG/M2 | WEIGHT: 262 LBS | HEART RATE: 90 BPM

## 2023-09-08 VITALS
BODY MASS INDEX: 35.53 KG/M2 | SYSTOLIC BLOOD PRESSURE: 101 MMHG | RESPIRATION RATE: 20 BRPM | WEIGHT: 262 LBS | TEMPERATURE: 97.3 F | HEART RATE: 90 BPM | DIASTOLIC BLOOD PRESSURE: 59 MMHG | OXYGEN SATURATION: 96 %

## 2023-09-08 DIAGNOSIS — G47.00 INSOMNIA, UNSPECIFIED TYPE: ICD-10-CM

## 2023-09-08 DIAGNOSIS — Z51.81 THERAPEUTIC DRUG MONITORING: Primary | ICD-10-CM

## 2023-09-08 DIAGNOSIS — R10.13 DYSPEPSIA: ICD-10-CM

## 2023-09-08 DIAGNOSIS — C67.4 MALIGNANT NEOPLASM OF POSTERIOR WALL OF URINARY BLADDER: Primary | ICD-10-CM

## 2023-09-08 DIAGNOSIS — C67.4 MALIGNANT NEOPLASM OF POSTERIOR WALL OF URINARY BLADDER: ICD-10-CM

## 2023-09-08 DIAGNOSIS — G89.3 CANCER RELATED PAIN: ICD-10-CM

## 2023-09-08 LAB
ALBUMIN SERPL-MCNC: 3.7 G/DL (ref 3.5–5.2)
ALBUMIN/GLOB SERPL: 1.4 G/DL
ALP SERPL-CCNC: 98 U/L (ref 39–117)
ALT SERPL W P-5'-P-CCNC: 33 U/L (ref 1–41)
AMPHET+METHAMPHET UR QL: NEGATIVE
AMPHETAMINES UR QL: NEGATIVE
ANION GAP SERPL CALCULATED.3IONS-SCNC: 11.8 MMOL/L (ref 5–15)
AST SERPL-CCNC: 32 U/L (ref 1–40)
BARBITURATES UR QL SCN: NEGATIVE
BASOPHILS # BLD AUTO: 0.06 10*3/MM3 (ref 0–0.2)
BASOPHILS NFR BLD AUTO: 0.8 % (ref 0–1.5)
BENZODIAZ UR QL SCN: NEGATIVE
BILIRUB SERPL-MCNC: 0.4 MG/DL (ref 0–1.2)
BUN SERPL-MCNC: 12 MG/DL (ref 8–23)
BUN/CREAT SERPL: 7.1 (ref 7–25)
BUPRENORPHINE SERPL-MCNC: NEGATIVE NG/ML
CALCIUM SPEC-SCNC: 9 MG/DL (ref 8.6–10.5)
CANNABINOIDS SERPL QL: NEGATIVE
CHLORIDE SERPL-SCNC: 100 MMOL/L (ref 98–107)
CO2 SERPL-SCNC: 22.2 MMOL/L (ref 22–29)
COCAINE UR QL: NEGATIVE
CREAT SERPL-MCNC: 1.7 MG/DL (ref 0.76–1.27)
DEPRECATED RDW RBC AUTO: 50.8 FL (ref 37–54)
EGFRCR SERPLBLD CKD-EPI 2021: 44.2 ML/MIN/1.73
EOSINOPHIL # BLD AUTO: 0.07 10*3/MM3 (ref 0–0.4)
EOSINOPHIL NFR BLD AUTO: 0.9 % (ref 0.3–6.2)
ERYTHROCYTE [DISTWIDTH] IN BLOOD BY AUTOMATED COUNT: 17.9 % (ref 12.3–15.4)
GLOBULIN UR ELPH-MCNC: 2.7 GM/DL
GLUCOSE SERPL-MCNC: 89 MG/DL (ref 65–99)
HCT VFR BLD AUTO: 31 % (ref 37.5–51)
HGB BLD-MCNC: 10.2 G/DL (ref 13–17.7)
IMM GRANULOCYTES # BLD AUTO: 0.09 10*3/MM3 (ref 0–0.05)
IMM GRANULOCYTES NFR BLD AUTO: 1.2 % (ref 0–0.5)
LYMPHOCYTES # BLD AUTO: 0.9 10*3/MM3 (ref 0.7–3.1)
LYMPHOCYTES NFR BLD AUTO: 11.7 % (ref 19.6–45.3)
MCH RBC QN AUTO: 27.9 PG (ref 26.6–33)
MCHC RBC AUTO-ENTMCNC: 32.9 G/DL (ref 31.5–35.7)
MCV RBC AUTO: 84.7 FL (ref 79–97)
METHADONE UR QL SCN: NEGATIVE
MONOCYTES # BLD AUTO: 1.02 10*3/MM3 (ref 0.1–0.9)
MONOCYTES NFR BLD AUTO: 13.3 % (ref 5–12)
NEUTROPHILS NFR BLD AUTO: 5.55 10*3/MM3 (ref 1.7–7)
NEUTROPHILS NFR BLD AUTO: 72.1 % (ref 42.7–76)
NRBC BLD AUTO-RTO: 0 /100 WBC (ref 0–0.2)
OPIATES UR QL: NEGATIVE
OXYCODONE UR QL SCN: POSITIVE
PCP UR QL SCN: NEGATIVE
PLATELET # BLD AUTO: 203 10*3/MM3 (ref 140–450)
PMV BLD AUTO: 8.5 FL (ref 6–12)
POTASSIUM SERPL-SCNC: 4.4 MMOL/L (ref 3.5–5.2)
PROPOXYPH UR QL: NEGATIVE
PROT SERPL-MCNC: 6.4 G/DL (ref 6–8.5)
RBC # BLD AUTO: 3.66 10*6/MM3 (ref 4.14–5.8)
SODIUM SERPL-SCNC: 134 MMOL/L (ref 136–145)
TRICYCLICS UR QL SCN: NEGATIVE
WBC NRBC COR # BLD: 7.69 10*3/MM3 (ref 3.4–10.8)

## 2023-09-08 PROCEDURE — 96375 TX/PRO/DX INJ NEW DRUG ADDON: CPT

## 2023-09-08 PROCEDURE — 96365 THER/PROPH/DIAG IV INF INIT: CPT

## 2023-09-08 PROCEDURE — 80053 COMPREHEN METABOLIC PANEL: CPT

## 2023-09-08 PROCEDURE — 96367 TX/PROPH/DG ADDL SEQ IV INF: CPT

## 2023-09-08 PROCEDURE — 25010000002 CARBOPLATIN PER 50 MG: Performed by: NURSE PRACTITIONER

## 2023-09-08 PROCEDURE — 25010000002 GEMCITABINE 200 MG/5.26ML SOLUTION 5.26 ML VIAL: Performed by: NURSE PRACTITIONER

## 2023-09-08 PROCEDURE — 36415 COLL VENOUS BLD VENIPUNCTURE: CPT

## 2023-09-08 PROCEDURE — 25010000002 PALONOSETRON 0.25 MG/5ML SOLUTION PREFILLED SYRINGE: Performed by: NURSE PRACTITIONER

## 2023-09-08 PROCEDURE — 25010000002 GEMCITABINE 1 GM/26.3ML SOLUTION 26.3 ML VIAL: Performed by: NURSE PRACTITIONER

## 2023-09-08 PROCEDURE — 25010000002 FOSAPREPITANT PER 1 MG: Performed by: NURSE PRACTITIONER

## 2023-09-08 PROCEDURE — 96417 CHEMO IV INFUS EACH ADDL SEQ: CPT

## 2023-09-08 PROCEDURE — 25010000002 DEXAMETHASONE SODIUM PHOSPHATE 20 MG/5ML SOLUTION: Performed by: NURSE PRACTITIONER

## 2023-09-08 PROCEDURE — 80306 DRUG TEST PRSMV INSTRMNT: CPT | Performed by: PHYSICIAN ASSISTANT

## 2023-09-08 PROCEDURE — 85025 COMPLETE CBC W/AUTO DIFF WBC: CPT

## 2023-09-08 PROCEDURE — 96413 CHEMO IV INFUSION 1 HR: CPT

## 2023-09-08 RX ORDER — GABAPENTIN 600 MG/1
600 TABLET ORAL 3 TIMES DAILY
Qty: 90 TABLET | Refills: 0 | Status: SHIPPED | OUTPATIENT
Start: 2023-09-08

## 2023-09-08 RX ORDER — DICYCLOMINE HYDROCHLORIDE 10 MG/1
10 CAPSULE ORAL
Qty: 30 CAPSULE | Refills: 0 | Status: SHIPPED | OUTPATIENT
Start: 2023-09-08

## 2023-09-08 RX ORDER — PALONOSETRON 0.05 MG/ML
0.25 INJECTION, SOLUTION INTRAVENOUS ONCE
Status: COMPLETED | OUTPATIENT
Start: 2023-09-08 | End: 2023-09-08

## 2023-09-08 RX ORDER — OLANZAPINE 5 MG/1
5 TABLET ORAL ONCE
Status: COMPLETED | OUTPATIENT
Start: 2023-09-08 | End: 2023-09-08

## 2023-09-08 RX ORDER — OXYCODONE AND ACETAMINOPHEN 10; 325 MG/1; MG/1
1 TABLET ORAL EVERY 4 HOURS PRN
Qty: 180 TABLET | Refills: 0 | Status: SHIPPED | OUTPATIENT
Start: 2023-09-08

## 2023-09-08 RX ORDER — DIPHENHYDRAMINE HYDROCHLORIDE 50 MG/ML
50 INJECTION INTRAMUSCULAR; INTRAVENOUS AS NEEDED
Status: DISCONTINUED | OUTPATIENT
Start: 2023-09-08 | End: 2023-09-08 | Stop reason: HOSPADM

## 2023-09-08 RX ORDER — FAMOTIDINE 10 MG/ML
20 INJECTION, SOLUTION INTRAVENOUS AS NEEDED
Status: DISCONTINUED | OUTPATIENT
Start: 2023-09-08 | End: 2023-09-08 | Stop reason: HOSPADM

## 2023-09-08 RX ORDER — SODIUM CHLORIDE 9 MG/ML
500 INJECTION, SOLUTION INTRAVENOUS ONCE
Status: COMPLETED | OUTPATIENT
Start: 2023-09-10 | End: 2023-09-08

## 2023-09-08 RX ORDER — POLYETHYLENE GLYCOL 3350, SODIUM SULFATE ANHYDROUS, SODIUM BICARBONATE, SODIUM CHLORIDE, POTASSIUM CHLORIDE 236; 22.74; 6.74; 5.86; 2.97 G/4L; G/4L; G/4L; G/4L; G/4L
POWDER, FOR SOLUTION ORAL
COMMUNITY

## 2023-09-08 RX ORDER — SODIUM CHLORIDE 9 MG/ML
500 INJECTION, SOLUTION INTRAVENOUS ONCE
Status: CANCELLED | OUTPATIENT
Start: 2023-09-10

## 2023-09-08 RX ORDER — FLUCONAZOLE 150 MG/1
TABLET ORAL
COMMUNITY

## 2023-09-08 RX ADMIN — SODIUM CHLORIDE 500 ML: 9 INJECTION, SOLUTION INTRAVENOUS at 10:10

## 2023-09-08 RX ADMIN — OLANZAPINE 5 MG: 5 TABLET, FILM COATED ORAL at 09:04

## 2023-09-08 RX ADMIN — CARBOPLATIN 490 MG: 10 INJECTION, SOLUTION INTRAVENOUS at 10:42

## 2023-09-08 RX ADMIN — GEMCITABINE HYDROCHLORIDE 2400 MG: 1 INJECTION, SOLUTION INTRAVENOUS at 10:00

## 2023-09-08 RX ADMIN — DEXAMETHASONE SODIUM PHOSPHATE 12 MG: 4 INJECTION, SOLUTION INTRA-ARTICULAR; INTRALESIONAL; INTRAMUSCULAR; INTRAVENOUS; SOFT TISSUE at 09:17

## 2023-09-08 RX ADMIN — PALONOSETRON 0.25 MG: 0.25 INJECTION, SOLUTION INTRAVENOUS at 09:04

## 2023-09-08 RX ADMIN — SODIUM CHLORIDE 150 MG: 9 INJECTION, SOLUTION INTRAVENOUS at 09:04

## 2023-09-08 NOTE — TELEPHONE ENCOUNTER
I have reviewed patient's BERNADETTE report prior to prescribing Schedule II, III, and IV medications. Request # [id number]. Next refill for Percocet 10/325 mg tab q4h PRN #[id number] was sent to the pharmacy. The patient is scheduled to follow-up in 2 months.

## 2023-09-15 ENCOUNTER — TRANSCRIBE ORDERS (OUTPATIENT)
Dept: LAB | Facility: HOSPITAL | Age: 68
End: 2023-09-15
Payer: COMMERCIAL

## 2023-09-15 ENCOUNTER — LAB (OUTPATIENT)
Dept: LAB | Facility: HOSPITAL | Age: 68
End: 2023-09-15
Payer: COMMERCIAL

## 2023-09-15 DIAGNOSIS — C68.9 MALIGNANT NEOPLASM OF URINARY ORGAN: Primary | ICD-10-CM

## 2023-09-15 DIAGNOSIS — C68.9 MALIGNANT NEOPLASM OF URINARY ORGAN: ICD-10-CM

## 2023-09-15 LAB
ALBUMIN SERPL-MCNC: 4.1 G/DL (ref 3.5–5.2)
ALBUMIN/GLOB SERPL: 1.5 G/DL
ALP SERPL-CCNC: 92 U/L (ref 39–117)
ALT SERPL W P-5'-P-CCNC: 51 U/L (ref 1–41)
ANION GAP SERPL CALCULATED.3IONS-SCNC: 11.2 MMOL/L (ref 5–15)
AST SERPL-CCNC: 30 U/L (ref 1–40)
BASOPHILS # BLD AUTO: 0.08 10*3/MM3 (ref 0–0.2)
BASOPHILS NFR BLD AUTO: 2.4 % (ref 0–1.5)
BILIRUB SERPL-MCNC: 0.2 MG/DL (ref 0–1.2)
BUN SERPL-MCNC: 14 MG/DL (ref 8–23)
BUN/CREAT SERPL: 11 (ref 7–25)
CALCIUM SPEC-SCNC: 9.6 MG/DL (ref 8.6–10.5)
CHLORIDE SERPL-SCNC: 100 MMOL/L (ref 98–107)
CO2 SERPL-SCNC: 22.8 MMOL/L (ref 22–29)
CREAT SERPL-MCNC: 1.27 MG/DL (ref 0.76–1.27)
DEPRECATED RDW RBC AUTO: 51 FL (ref 37–54)
EGFRCR SERPLBLD CKD-EPI 2021: 62.7 ML/MIN/1.73
EOSINOPHIL # BLD AUTO: 0.04 10*3/MM3 (ref 0–0.4)
EOSINOPHIL NFR BLD AUTO: 1.2 % (ref 0.3–6.2)
ERYTHROCYTE [DISTWIDTH] IN BLOOD BY AUTOMATED COUNT: 17.2 % (ref 12.3–15.4)
GLOBULIN UR ELPH-MCNC: 2.7 GM/DL
GLUCOSE SERPL-MCNC: 93 MG/DL (ref 65–99)
HCT VFR BLD AUTO: 32.5 % (ref 37.5–51)
HGB BLD-MCNC: 10.8 G/DL (ref 13–17.7)
IMM GRANULOCYTES # BLD AUTO: 0.06 10*3/MM3 (ref 0–0.05)
IMM GRANULOCYTES NFR BLD AUTO: 1.8 % (ref 0–0.5)
LYMPHOCYTES # BLD AUTO: 0.92 10*3/MM3 (ref 0.7–3.1)
LYMPHOCYTES NFR BLD AUTO: 28 % (ref 19.6–45.3)
MCH RBC QN AUTO: 27.9 PG (ref 26.6–33)
MCHC RBC AUTO-ENTMCNC: 33.2 G/DL (ref 31.5–35.7)
MCV RBC AUTO: 84 FL (ref 79–97)
MONOCYTES # BLD AUTO: 0.27 10*3/MM3 (ref 0.1–0.9)
MONOCYTES NFR BLD AUTO: 8.2 % (ref 5–12)
NEUTROPHILS NFR BLD AUTO: 1.92 10*3/MM3 (ref 1.7–7)
NEUTROPHILS NFR BLD AUTO: 58.4 % (ref 42.7–76)
NRBC BLD AUTO-RTO: 0 /100 WBC (ref 0–0.2)
PLATELET # BLD AUTO: 241 10*3/MM3 (ref 140–450)
PMV BLD AUTO: 7.6 FL (ref 6–12)
POTASSIUM SERPL-SCNC: 4.3 MMOL/L (ref 3.5–5.2)
PROT SERPL-MCNC: 6.8 G/DL (ref 6–8.5)
RBC # BLD AUTO: 3.87 10*6/MM3 (ref 4.14–5.8)
SODIUM SERPL-SCNC: 134 MMOL/L (ref 136–145)
WBC NRBC COR # BLD: 3.29 10*3/MM3 (ref 3.4–10.8)

## 2023-09-15 PROCEDURE — 36415 COLL VENOUS BLD VENIPUNCTURE: CPT

## 2023-09-15 PROCEDURE — 80053 COMPREHEN METABOLIC PANEL: CPT

## 2023-09-15 PROCEDURE — 85025 COMPLETE CBC W/AUTO DIFF WBC: CPT

## 2023-09-26 ENCOUNTER — OFFICE VISIT (OUTPATIENT)
Dept: PSYCHIATRY | Facility: CLINIC | Age: 68
End: 2023-09-26
Payer: COMMERCIAL

## 2023-09-26 DIAGNOSIS — F43.22 ADJUSTMENT DISORDER WITH ANXIOUS MOOD: Primary | ICD-10-CM

## 2023-09-26 DIAGNOSIS — G47.9 SLEEP DISTURBANCE: ICD-10-CM

## 2023-09-26 PROCEDURE — 90792 PSYCH DIAG EVAL W/MED SRVCS: CPT | Performed by: NURSE PRACTITIONER

## 2023-09-26 NOTE — PROGRESS NOTES
"  Subjective   Tru Buitrago is a 67 y.o. male who is here today for initial appointment in person. Patient was referred by: Mesha VILLA, medical oncology department for patient's diagnosis stage IV bladder cancer. He lives in [place], [place] with his life partner.       Chief Complaint:  worry, concerns     History of Present Illness The patient reports the following symptoms of anxiety:  anxiety/worry, difficulty concentrating at times, mind goes blank, occasional irritability, muscle tension, and sleep disturbance. The symptoms have been present for at least 6 month(s) and have caused impairment in important areas of functioning. Reports difficulty with staying asleep at night. His mind won't turn off but will take naps during day. He reports good support from family. He reports he was told he probably only had two years to live. He decided to get second opinion and felt more confident in second opinion and switching provider care to Dr Sully Goss with [facility]. He states he feels calmer now with new eyes on his situation. He has good pain control now. He cont to smoke cigarettes daily but does not drink alcohol or use illicit drugs. He denies SI/HI or AVH. Denies PTSD, OCD or yolanda. He states he has teared up some over his condition but doesn't feel hopeless and wanting a few more decent years to live. He wants to get \"my will and  figured out and down size our house but keep the house in the family\". Denies panic. \"I feel less worried now with having a provider I feel I can talk to better\".     The following portions of the patient's history were reviewed and updated as appropriate: allergies, current medications, past family history, past medical history, past social history, past surgical history, and problem list.      Past Psych History: was on prozac when going through a divorce about 30 years ago and states it made him very angry. Denies SI/HI or AVH    Substance Abuse: " smokes nicotine daily        BERNADETTE REVIEWED: no red flags       Family Psychiatric History:  family history includes Cancer in his brother; Throat cancer in his brother.      Social History: Raised by his parents, both  now. Raised in [facility]. He  and has 3 adult children, son 31yo who is  and has 4 children. Son now mows his acreage around his house for patient. Has two daughter 42 and 42 yo. Reports close to all three. He and wife . He has been with his life partner Warren for over 20 years. She was a , now retired and he was a nurse for correctional facility for 30 years. He stopped working in  Aug 2022. They have dogs and he enjoys his family and grandchildren. He is looking to down size.       Medical/Surgical History:  Past Medical History:   Diagnosis Date    Bladder cancer     COPD (chronic obstructive pulmonary disease)     GERD (gastroesophageal reflux disease)     Hyperlipidemia     Hypertension     Postoperative nausea 8/3/2022     Past Surgical History:   Procedure Laterality Date    COLONOSCOPY      2018    CYSTECTOMY N/A 2022    Procedure: CYSTO-PROSTATECTOMY, PELVIC LYMPH NODE DISSECTION LAPAROSCOPIC WITH [employer] ROB[employer], CREATION OF ILEAL CONDUIT;  Surgeon: Godfrey Lawrence Jr., MD;  [facility]: [facility];  Service: Robotics - DaVinci;  Laterality: N/A;    CYSTOSCOPY BLADDER BIOPSY         Allergies   Allergen Reactions    Fluticasone-Salmeterol Unknown - Low Severity     Thrush    Spiriva Respimat [Tiotropium Bromide Monohydrate] Unknown - Low Severity     Thrush    Tetanus Toxoids Swelling    Food Color Orange [Yellow Dye] Nausea Only    Naproxen Nausea Only    Orange Flavor GI Intolerance       Current Medications:   Current Outpatient Medications   Medication Sig Dispense Refill    albuterol sulfate  (90 Base) MCG/ACT inhaler Inhale 2 puffs Every 4 (Four) Hours As Needed for Wheezing.      Anoro Ellipta 62.5-25 MCG/INH aerosol powder   inhaler Inhale 1 puff Daily.      atorvastatin (LIPITOR) 20 MG tablet Take 1 tablet by mouth Daily.      dicyclomine (BENTYL) 10 MG capsule Take 1 capsule by mouth 4 (Four) Times a Day Before Meals & at Bedtime. 30 capsule 0    esomeprazole (nexIUM) 40 MG capsule       famotidine (PEPCID) 40 MG tablet Take 1 tablet by mouth 3 (Three) Times a Day.      fexofenadine (ALLEGRA) 180 MG tablet Take 1 tablet by mouth Daily.      fluconazole (DIFLUCAN) 150 MG tablet Take by oral route.      gabapentin (NEURONTIN) 600 MG tablet Take 1 tablet by mouth 3 (Three) Times a Day. 90 tablet 0    losartan (COZAAR) 50 MG tablet Take 1 tablet by mouth Daily.      metoclopramide (REGLAN) 10 MG tablet       montelukast (SINGULAIR) 10 MG tablet Take 1 tablet by mouth Every Night.      multivitamin with minerals tablet tablet Take 1 tablet by mouth Daily.      nicotine (NICODERM CQ) 14 MG/24HR patch Place 1 patch on the skin as directed by provider Daily.      nystatin susp + lidocaine viscous (MAGIC MOUTHWASH) oral suspension 5-10 ml swish and spit or swallow QID prn 240 mL 3    OLANZapine (zyPREXA) 5 MG tablet Take 1 tablet by mouth Every Night. Take on days 2, 3 and 4 after chemotherapy. 3 tablet 5    ondansetron (ZOFRAN) 8 MG tablet Take 1 tablet by mouth 3 (Three) Times a Day As Needed for Nausea or Vomiting. 30 tablet 5    oxyCODONE-acetaminophen (PERCOCET)  MG per tablet Take 1 tablet by mouth Every 4 (Four) Hours As Needed for Moderate Pain or Severe Pain. 180 tablet 0    polyethylene glycol (Golytely) 236 g solution Take by oral route.      polyethylene glycol (MIRALAX) 17 g packet Take 17 g by mouth Daily. 30 packet 0    promethazine (PHENERGAN) 25 MG tablet Take 1 tablet by mouth Every 6 (Six) Hours As Needed for Nausea or Vomiting. 30 tablet 3    rivaroxaban (XARELTO) 20 MG tablet Take 1 tablet by mouth Every Night.      tiZANidine (ZANAFLEX) 4 MG tablet Take 1 tablet by mouth Every 8 (Eight) Hours As Needed.       verapamil ER (VERELAN) 240 MG 24 hr capsule Take 1 capsule by mouth Every Night.       No current facility-administered medications for this visit.       Lab Results: reviewed in [facility]         Review of Systems Constitutional: Negative for appetite change, chills, diaphoresis, fatigue, fever and unexpected weight change.   HENT: Negative for hearing loss, sore throat, trouble swallowing and voice change.    Eyes: Negative for photophobia and visual disturbance.   Respiratory: Negative for cough, chest tightness and shortness of breath.    Cardiovascular: Negative for chest pain and palpitations.   Gastrointestinal: Negative for abdominal pain, constipation, nausea and vomiting.   Endocrine: Negative for cold intolerance and heat intolerance.   Genitourinary: Negative for dysuria and frequency.   Musculoskeletal: Negative for arthralgia, back pain, joint swelling and neck stiffness.   Skin: Negative for color change and wound.   Allergic/Immunologic: Negative for environmental allergies and immunocompromised state.   Neurological: Negative for dizziness, tremors, seizures, syncope, weakness, light-headedness and headaches.   Hematological: Negative for adenopathy. Does not bruise/bleed easily.    Objective   Physical Exam  There were no vitals taken for this visit.    NORM-7:    Over the last two weeks, how often have you been bothered by the following problems?  Feeling nervous, anxious or on edge: Several days  Not being able to stop or control worrying: Not at all  Worrying too much about different things: Not at all  Trouble Relaxing: Not at all  Being so restless that it is hard to sit still: Not at all  Becoming easily annoyed or irritable: Several days  Feeling afraid as if something awful might happen: Several days  NOMR 7 Total Score: 3  If you checked any problems, how difficult have these problems made it for you to do your work, take care of things at home, or get along with other people: Not difficult at  all  0-4: Minimal anxiety  5-9: Mild anxiety  10-14: Moderate anxiety  15-21: Severe anxiety    PHQ-9:      9/8/2023    11:00 AM   PHQ-2/PHQ-9 Depression Screening   Little Interest or Pleasure in Doing Things 0-->not at all   Feeling Down, Depressed or Hopeless 0-->not at all   Trouble Falling or Staying Asleep, or Sleeping Too Much 1-->several days   Feeling Tired or Having Little Energy 1-->several days   Poor Appetite or Overeating 0-->not at all   Feeling Bad about Yourself - or that You are a Failure or Have Let Yourself or Your Family Down 0-->not at all   Trouble Concentrating on Things, Such as Reading the Newspaper or Watching Television 0-->not at all   Moving or Speaking So Slowly that Other People Could Have Noticed? Or the Opposite - Being So Fidgety 0-->not at all   Thoughts that You Would be Better Off Dead or of Hurting Yourself in Some Way 0-->not at all   PHQ-9: Brief Depression Severity Measure Score 2      5-9: Minimal symptoms  10-14: Major depression mild  15-19: Major depression moderate  Greater then 20: Major depression severe          Mental Status Exam:   Appearance: appropriate  Hygiene:   good  Cooperation:  Cooperative  Eye Contact:  Good  Psychomotor Behavior:  Appropriate  Mood:  euthymic  Affect:  Appropriate  Hopelessness: Denies  Speech:  Normal  Thought Process:  Linear  Thought Content:  Normal  Suicidal:  None  Homicidal:  None  Hallucinations:  None  Delusion:  None  Memory:  Intact  Orientation:  Person, Place, Time, and Situation  Reliability:  good  Insight:  Good  Judgement:  Good  Impulse Control:  Good  Physical/Medical Issues:  Yes stage IV bladder cancer      Short-term goals: Patient will be compliant with clinic appointments.  Patient will be engaged in therapy, medication compliant with minimal side effects. Patient  will report decrease of symptoms and frequency.    Long-term goals: Patient will have minimal symptoms of mental health disorder with continued  treatment. Patient will be compliant with treatment and appointments.       Problem list: adjustment disorder   Strengths: patient appears motivated for treatment          Assessment & Plan   Diagnoses and all orders for this visit:    1. Adjustment disorder with anxious mood (Primary)    2. Sleep disturbance        A psychological evaluation was conducted in order to assess past and current level of functioning. Areas assessed included, but were not limited to: perception of social support, perception of ability to face and deal with challenges in life (positive functioning), anxiety symptoms, depressive symptoms, perspective on beliefs/belief system, coping skills for stress, intelligence level,  Therapeutic rapport was established.     Assisted patient in processing above session content; acknowledged and normalized patient’s thoughts, feelings, and concerns.  Applied  positive coping skills and behavior management in session. Allowed patient to freely discuss issues without interruption or judgment. Provided safe, confidential environment to facilitate the development of positive therapeutic relationship and encourage open, honest communication.     Assisted patient in identifying risk factors which would indicate the need for higher level of care including thoughts to harm self or others and/or self-harming behavior and encouraged patient to contact this office, call 911, or present to the nearest emergency room should any of these events occur. Discussed crisis intervention services and means to access.  Patient adamantly and convincingly denies current suicidal or homicidal ideation or perceptual disturbance.    Discussed diagnosis and recommendations for treatment:    PROVIDE as needed : Cognitive Behavioral Therapy and Solution Focused Therapy to improve functioning, maintain stability, and avoid decompensation and the need for higher level of care.    MEDICATION MANAGEMENT RECOMMENDATIONS: none at this  time     Sleep hygiene reviewed.       We discussed risks, benefits,goals and side effects of the above medication and the patient was agreeable with the plan.Patient was educated on the importance of compliance with treatment and follow-up appointments.To call for questions or concerns and return early if necessary. Crisis plan reviewed including going to the Emergency department.       Treatment Plan: stabilize mood,  patient will stay out of the hospital and be at optimal level of functioning, take all medication as prescribed. Patient verbalized  understanding and agreement to plan.      Return if symptoms worsen or fail to improve, for pt going to [facility] for treatment.

## 2023-09-29 ENCOUNTER — LAB (OUTPATIENT)
Dept: LAB | Facility: HOSPITAL | Age: 68
End: 2023-09-29
Payer: COMMERCIAL

## 2023-09-29 ENCOUNTER — TRANSCRIBE ORDERS (OUTPATIENT)
Dept: LAB | Facility: HOSPITAL | Age: 68
End: 2023-09-29
Payer: COMMERCIAL

## 2023-09-29 DIAGNOSIS — C68.9 MALIGNANT NEOPLASM OF URINARY ORGAN: ICD-10-CM

## 2023-09-29 DIAGNOSIS — C67.4 MALIGNANT NEOPLASM OF POSTERIOR WALL OF URINARY BLADDER: ICD-10-CM

## 2023-09-29 DIAGNOSIS — C68.9 MALIGNANT NEOPLASM OF URINARY ORGAN: Primary | ICD-10-CM

## 2023-09-29 LAB
ALBUMIN SERPL-MCNC: 4 G/DL (ref 3.5–5.2)
ALBUMIN/GLOB SERPL: 1.5 G/DL
ALP SERPL-CCNC: 101 U/L (ref 39–117)
ALT SERPL W P-5'-P-CCNC: 22 U/L (ref 1–41)
ANION GAP SERPL CALCULATED.3IONS-SCNC: 11.8 MMOL/L (ref 5–15)
ANISOCYTOSIS BLD QL: NORMAL
AST SERPL-CCNC: 19 U/L (ref 1–40)
BASOPHILS # BLD AUTO: 0.06 10*3/MM3 (ref 0–0.2)
BASOPHILS NFR BLD AUTO: 1.2 % (ref 0–1.5)
BILIRUB SERPL-MCNC: 0.2 MG/DL (ref 0–1.2)
BUN SERPL-MCNC: 11 MG/DL (ref 8–23)
BUN/CREAT SERPL: 8 (ref 7–25)
CALCIUM SPEC-SCNC: 9.2 MG/DL (ref 8.6–10.5)
CHLORIDE SERPL-SCNC: 103 MMOL/L (ref 98–107)
CO2 SERPL-SCNC: 23.2 MMOL/L (ref 22–29)
CREAT SERPL-MCNC: 1.37 MG/DL (ref 0.76–1.27)
DEPRECATED RDW RBC AUTO: 61 FL (ref 37–54)
EGFRCR SERPLBLD CKD-EPI 2021: 57.2 ML/MIN/1.73
EOSINOPHIL # BLD AUTO: 0.11 10*3/MM3 (ref 0–0.4)
EOSINOPHIL NFR BLD AUTO: 2.1 % (ref 0.3–6.2)
ERYTHROCYTE [DISTWIDTH] IN BLOOD BY AUTOMATED COUNT: 20.6 % (ref 12.3–15.4)
GLOBULIN UR ELPH-MCNC: 2.7 GM/DL
GLUCOSE SERPL-MCNC: 106 MG/DL (ref 65–99)
HCT VFR BLD AUTO: 31.9 % (ref 37.5–51)
HGB BLD-MCNC: 10.6 G/DL (ref 13–17.7)
HYPOCHROMIA BLD QL: NORMAL
IMM GRANULOCYTES # BLD AUTO: 0.06 10*3/MM3 (ref 0–0.05)
IMM GRANULOCYTES NFR BLD AUTO: 1.2 % (ref 0–0.5)
LYMPHOCYTES # BLD AUTO: 0.94 10*3/MM3 (ref 0.7–3.1)
LYMPHOCYTES NFR BLD AUTO: 18.2 % (ref 19.6–45.3)
MACROCYTES BLD QL SMEAR: NORMAL
MCH RBC QN AUTO: 29 PG (ref 26.6–33)
MCHC RBC AUTO-ENTMCNC: 33.2 G/DL (ref 31.5–35.7)
MCV RBC AUTO: 87.2 FL (ref 79–97)
MONOCYTES # BLD AUTO: 0.65 10*3/MM3 (ref 0.1–0.9)
MONOCYTES NFR BLD AUTO: 12.6 % (ref 5–12)
NEUTROPHILS NFR BLD AUTO: 3.35 10*3/MM3 (ref 1.7–7)
NEUTROPHILS NFR BLD AUTO: 64.7 % (ref 42.7–76)
NRBC BLD AUTO-RTO: 0 /100 WBC (ref 0–0.2)
PLATELET # BLD AUTO: 204 10*3/MM3 (ref 140–450)
PMV BLD AUTO: 9 FL (ref 6–12)
POTASSIUM SERPL-SCNC: 4.1 MMOL/L (ref 3.5–5.2)
PROT SERPL-MCNC: 6.7 G/DL (ref 6–8.5)
RBC # BLD AUTO: 3.66 10*6/MM3 (ref 4.14–5.8)
SMALL PLATELETS BLD QL SMEAR: ADEQUATE
SODIUM SERPL-SCNC: 138 MMOL/L (ref 136–145)
WBC MORPH BLD: NORMAL
WBC NRBC COR # BLD: 5.17 10*3/MM3 (ref 3.4–10.8)

## 2023-09-29 PROCEDURE — 80053 COMPREHEN METABOLIC PANEL: CPT

## 2023-09-29 PROCEDURE — 36415 COLL VENOUS BLD VENIPUNCTURE: CPT

## 2023-09-29 PROCEDURE — 85007 BL SMEAR W/DIFF WBC COUNT: CPT

## 2023-09-29 PROCEDURE — 85025 COMPLETE CBC W/AUTO DIFF WBC: CPT

## 2023-10-06 ENCOUNTER — LAB (OUTPATIENT)
Dept: LAB | Facility: HOSPITAL | Age: 68
End: 2023-10-06
Payer: MEDICARE

## 2023-10-06 ENCOUNTER — TRANSCRIBE ORDERS (OUTPATIENT)
Dept: LAB | Facility: HOSPITAL | Age: 68
End: 2023-10-06
Payer: COMMERCIAL

## 2023-10-06 DIAGNOSIS — C68.9 MALIGNANT NEOPLASM OF URINARY ORGAN: Primary | ICD-10-CM

## 2023-10-06 DIAGNOSIS — C68.9 MALIGNANT NEOPLASM OF URINARY ORGAN: ICD-10-CM

## 2023-10-06 LAB
ALBUMIN SERPL-MCNC: 4.1 G/DL (ref 3.5–5.2)
ALBUMIN/GLOB SERPL: 1.6 G/DL
ALP SERPL-CCNC: 94 U/L (ref 39–117)
ALT SERPL W P-5'-P-CCNC: 71 U/L (ref 1–41)
ANION GAP SERPL CALCULATED.3IONS-SCNC: 12.9 MMOL/L (ref 5–15)
ANISOCYTOSIS BLD QL: ABNORMAL
AST SERPL-CCNC: 52 U/L (ref 1–40)
BASOPHILS # BLD MANUAL: 0.06 10*3/MM3 (ref 0–0.2)
BASOPHILS NFR BLD MANUAL: 1 % (ref 0–1.5)
BILIRUB SERPL-MCNC: 0.3 MG/DL (ref 0–1.2)
BUN SERPL-MCNC: 18 MG/DL (ref 8–23)
BUN/CREAT SERPL: 12.5 (ref 7–25)
CALCIUM SPEC-SCNC: 9.8 MG/DL (ref 8.6–10.5)
CHLORIDE SERPL-SCNC: 104 MMOL/L (ref 98–107)
CO2 SERPL-SCNC: 21.1 MMOL/L (ref 22–29)
CREAT SERPL-MCNC: 1.44 MG/DL (ref 0.76–1.27)
DEPRECATED RDW RBC AUTO: 63.7 FL (ref 37–54)
EGFRCR SERPLBLD CKD-EPI 2021: 53.9 ML/MIN/1.73
EOSINOPHIL # BLD MANUAL: 0.06 10*3/MM3 (ref 0–0.4)
EOSINOPHIL NFR BLD MANUAL: 1 % (ref 0.3–6.2)
ERYTHROCYTE [DISTWIDTH] IN BLOOD BY AUTOMATED COUNT: 20.4 % (ref 12.3–15.4)
GLOBULIN UR ELPH-MCNC: 2.6 GM/DL
GLUCOSE SERPL-MCNC: 109 MG/DL (ref 65–99)
HCT VFR BLD AUTO: 31.4 % (ref 37.5–51)
HGB BLD-MCNC: 10.5 G/DL (ref 13–17.7)
LYMPHOCYTES # BLD MANUAL: 1.65 10*3/MM3 (ref 0.7–3.1)
LYMPHOCYTES NFR BLD MANUAL: 9 % (ref 5–12)
MCH RBC QN AUTO: 29.4 PG (ref 26.6–33)
MCHC RBC AUTO-ENTMCNC: 33.4 G/DL (ref 31.5–35.7)
MCV RBC AUTO: 88 FL (ref 79–97)
METAMYELOCYTES NFR BLD MANUAL: 5 % (ref 0–0)
MONOCYTES # BLD: 0.5 10*3/MM3 (ref 0.1–0.9)
MYELOCYTES NFR BLD MANUAL: 1 % (ref 0–0)
NEUTROPHILS # BLD AUTO: 2.92 10*3/MM3 (ref 1.7–7)
NEUTROPHILS NFR BLD MANUAL: 53 % (ref 42.7–76)
NRBC SPEC MANUAL: 1 /100 WBC (ref 0–0.2)
PLATELET # BLD AUTO: 305 10*3/MM3 (ref 140–450)
PMV BLD AUTO: 8.2 FL (ref 6–12)
POTASSIUM SERPL-SCNC: 4.1 MMOL/L (ref 3.5–5.2)
PROT SERPL-MCNC: 6.7 G/DL (ref 6–8.5)
RBC # BLD AUTO: 3.57 10*6/MM3 (ref 4.14–5.8)
SCAN SLIDE: NORMAL
SMALL PLATELETS BLD QL SMEAR: ADEQUATE
SODIUM SERPL-SCNC: 138 MMOL/L (ref 136–145)
VARIANT LYMPHS NFR BLD MANUAL: 30 % (ref 19.6–45.3)
WBC MORPH BLD: NORMAL
WBC NRBC COR # BLD: 5.5 10*3/MM3 (ref 3.4–10.8)

## 2023-10-06 PROCEDURE — 85025 COMPLETE CBC W/AUTO DIFF WBC: CPT

## 2023-10-06 PROCEDURE — 85007 BL SMEAR W/DIFF WBC COUNT: CPT

## 2023-10-06 PROCEDURE — 80053 COMPREHEN METABOLIC PANEL: CPT

## 2023-10-06 PROCEDURE — 36415 COLL VENOUS BLD VENIPUNCTURE: CPT

## 2023-10-20 ENCOUNTER — LAB (OUTPATIENT)
Dept: LAB | Facility: HOSPITAL | Age: 68
End: 2023-10-20
Payer: COMMERCIAL

## 2023-10-20 ENCOUNTER — TRANSCRIBE ORDERS (OUTPATIENT)
Dept: LAB | Facility: HOSPITAL | Age: 68
End: 2023-10-20
Payer: COMMERCIAL

## 2023-10-20 DIAGNOSIS — C68.9 MALIGNANT NEOPLASM OF URINARY ORGAN: ICD-10-CM

## 2023-10-20 DIAGNOSIS — C68.9 MALIGNANT NEOPLASM OF URINARY ORGAN: Primary | ICD-10-CM

## 2023-10-20 LAB
ALBUMIN SERPL-MCNC: 3.9 G/DL (ref 3.5–5.2)
ALBUMIN/GLOB SERPL: 1.6 G/DL
ALP SERPL-CCNC: 99 U/L (ref 39–117)
ALT SERPL W P-5'-P-CCNC: 26 U/L (ref 1–41)
ANION GAP SERPL CALCULATED.3IONS-SCNC: 11.2 MMOL/L (ref 5–15)
AST SERPL-CCNC: 24 U/L (ref 1–40)
BASOPHILS # BLD AUTO: 0.05 10*3/MM3 (ref 0–0.2)
BASOPHILS NFR BLD AUTO: 1 % (ref 0–1.5)
BILIRUB SERPL-MCNC: 0.3 MG/DL (ref 0–1.2)
BUN SERPL-MCNC: 9 MG/DL (ref 8–23)
BUN/CREAT SERPL: 6.3 (ref 7–25)
CALCIUM SPEC-SCNC: 9.3 MG/DL (ref 8.6–10.5)
CHLORIDE SERPL-SCNC: 102 MMOL/L (ref 98–107)
CO2 SERPL-SCNC: 22.8 MMOL/L (ref 22–29)
CREAT SERPL-MCNC: 1.42 MG/DL (ref 0.76–1.27)
DEPRECATED RDW RBC AUTO: 75 FL (ref 37–54)
EGFRCR SERPLBLD CKD-EPI 2021: 54.8 ML/MIN/1.73
EOSINOPHIL # BLD AUTO: 0.17 10*3/MM3 (ref 0–0.4)
EOSINOPHIL NFR BLD AUTO: 3.5 % (ref 0.3–6.2)
ERYTHROCYTE [DISTWIDTH] IN BLOOD BY AUTOMATED COUNT: 23 % (ref 12.3–15.4)
GLOBULIN UR ELPH-MCNC: 2.5 GM/DL
GLUCOSE SERPL-MCNC: 108 MG/DL (ref 65–99)
HCT VFR BLD AUTO: 29.2 % (ref 37.5–51)
HGB BLD-MCNC: 9.6 G/DL (ref 13–17.7)
IMM GRANULOCYTES # BLD AUTO: 0.08 10*3/MM3 (ref 0–0.05)
IMM GRANULOCYTES NFR BLD AUTO: 1.6 % (ref 0–0.5)
LYMPHOCYTES # BLD AUTO: 0.66 10*3/MM3 (ref 0.7–3.1)
LYMPHOCYTES NFR BLD AUTO: 13.6 % (ref 19.6–45.3)
MAGNESIUM SERPL-MCNC: 2.2 MG/DL (ref 1.6–2.4)
MCH RBC QN AUTO: 30.2 PG (ref 26.6–33)
MCHC RBC AUTO-ENTMCNC: 32.9 G/DL (ref 31.5–35.7)
MCV RBC AUTO: 91.8 FL (ref 79–97)
MONOCYTES # BLD AUTO: 0.7 10*3/MM3 (ref 0.1–0.9)
MONOCYTES NFR BLD AUTO: 14.4 % (ref 5–12)
NEUTROPHILS NFR BLD AUTO: 3.2 10*3/MM3 (ref 1.7–7)
NEUTROPHILS NFR BLD AUTO: 65.9 % (ref 42.7–76)
NRBC BLD AUTO-RTO: 0 /100 WBC (ref 0–0.2)
PLATELET # BLD AUTO: 120 10*3/MM3 (ref 140–450)
PMV BLD AUTO: 8.9 FL (ref 6–12)
POTASSIUM SERPL-SCNC: 4.8 MMOL/L (ref 3.5–5.2)
PROT SERPL-MCNC: 6.4 G/DL (ref 6–8.5)
RBC # BLD AUTO: 3.18 10*6/MM3 (ref 4.14–5.8)
SODIUM SERPL-SCNC: 136 MMOL/L (ref 136–145)
WBC NRBC COR # BLD: 4.86 10*3/MM3 (ref 3.4–10.8)

## 2023-10-20 PROCEDURE — 83735 ASSAY OF MAGNESIUM: CPT

## 2023-10-20 PROCEDURE — 80053 COMPREHEN METABOLIC PANEL: CPT

## 2023-10-20 PROCEDURE — 36415 COLL VENOUS BLD VENIPUNCTURE: CPT

## 2023-10-20 PROCEDURE — 85025 COMPLETE CBC W/AUTO DIFF WBC: CPT

## 2023-10-23 ENCOUNTER — TRANSCRIBE ORDERS (OUTPATIENT)
Dept: ADMINISTRATIVE | Facility: HOSPITAL | Age: 68
End: 2023-10-23
Payer: COMMERCIAL

## 2023-10-23 DIAGNOSIS — C68.9 MALIGNANT NEOPLASM OF URINARY ORGAN: Primary | ICD-10-CM

## 2023-10-27 ENCOUNTER — LAB (OUTPATIENT)
Dept: LAB | Facility: HOSPITAL | Age: 68
End: 2023-10-27
Payer: COMMERCIAL

## 2023-10-27 ENCOUNTER — TRANSCRIBE ORDERS (OUTPATIENT)
Dept: LAB | Facility: HOSPITAL | Age: 68
End: 2023-10-27
Payer: COMMERCIAL

## 2023-10-27 DIAGNOSIS — C68.9 MALIGNANT NEOPLASM OF URINARY ORGAN: ICD-10-CM

## 2023-10-27 DIAGNOSIS — C68.9 MALIGNANT NEOPLASM OF URINARY ORGAN: Primary | ICD-10-CM

## 2023-10-27 LAB
ALBUMIN SERPL-MCNC: 3.8 G/DL (ref 3.5–5.2)
ALBUMIN/GLOB SERPL: 1.5 G/DL
ALP SERPL-CCNC: 96 U/L (ref 39–117)
ALT SERPL W P-5'-P-CCNC: 28 U/L (ref 1–41)
ANION GAP SERPL CALCULATED.3IONS-SCNC: 10.5 MMOL/L (ref 5–15)
ANISOCYTOSIS BLD QL: NORMAL
AST SERPL-CCNC: 30 U/L (ref 1–40)
BASOPHILS # BLD AUTO: 0.1 10*3/MM3 (ref 0–0.2)
BASOPHILS NFR BLD AUTO: 2.4 % (ref 0–1.5)
BILIRUB SERPL-MCNC: 0.2 MG/DL (ref 0–1.2)
BUN SERPL-MCNC: 13 MG/DL (ref 8–23)
BUN/CREAT SERPL: 9.6 (ref 7–25)
CALCIUM SPEC-SCNC: 9.7 MG/DL (ref 8.6–10.5)
CHLORIDE SERPL-SCNC: 104 MMOL/L (ref 98–107)
CO2 SERPL-SCNC: 24.5 MMOL/L (ref 22–29)
CREAT SERPL-MCNC: 1.36 MG/DL (ref 0.76–1.27)
DEPRECATED RDW RBC AUTO: 70.1 FL (ref 37–54)
EGFRCR SERPLBLD CKD-EPI 2021: 57.8 ML/MIN/1.73
ELLIPTOCYTES BLD QL SMEAR: NORMAL
EOSINOPHIL # BLD AUTO: 0.14 10*3/MM3 (ref 0–0.4)
EOSINOPHIL NFR BLD AUTO: 3.4 % (ref 0.3–6.2)
ERYTHROCYTE [DISTWIDTH] IN BLOOD BY AUTOMATED COUNT: 21.5 % (ref 12.3–15.4)
GLOBULIN UR ELPH-MCNC: 2.6 GM/DL
GLUCOSE SERPL-MCNC: 90 MG/DL (ref 65–99)
HCT VFR BLD AUTO: 29.4 % (ref 37.5–51)
HGB BLD-MCNC: 9.8 G/DL (ref 13–17.7)
IMM GRANULOCYTES # BLD AUTO: 0.18 10*3/MM3 (ref 0–0.05)
IMM GRANULOCYTES NFR BLD AUTO: 4.4 % (ref 0–0.5)
LYMPHOCYTES # BLD AUTO: 0.87 10*3/MM3 (ref 0.7–3.1)
LYMPHOCYTES NFR BLD AUTO: 21.1 % (ref 19.6–45.3)
MCH RBC QN AUTO: 30.1 PG (ref 26.6–33)
MCHC RBC AUTO-ENTMCNC: 33.3 G/DL (ref 31.5–35.7)
MCV RBC AUTO: 90.2 FL (ref 79–97)
MONOCYTES # BLD AUTO: 0.58 10*3/MM3 (ref 0.1–0.9)
MONOCYTES NFR BLD AUTO: 14.1 % (ref 5–12)
NEUTROPHILS NFR BLD AUTO: 2.25 10*3/MM3 (ref 1.7–7)
NEUTROPHILS NFR BLD AUTO: 54.6 % (ref 42.7–76)
NRBC BLD AUTO-RTO: 0 /100 WBC (ref 0–0.2)
PLATELET # BLD AUTO: 279 10*3/MM3 (ref 140–450)
PMV BLD AUTO: 8.4 FL (ref 6–12)
POIKILOCYTOSIS BLD QL SMEAR: NORMAL
POTASSIUM SERPL-SCNC: 4.4 MMOL/L (ref 3.5–5.2)
PROT SERPL-MCNC: 6.4 G/DL (ref 6–8.5)
RBC # BLD AUTO: 3.26 10*6/MM3 (ref 4.14–5.8)
SMALL PLATELETS BLD QL SMEAR: ADEQUATE
SODIUM SERPL-SCNC: 139 MMOL/L (ref 136–145)
WBC MORPH BLD: NORMAL
WBC NRBC COR # BLD: 4.12 10*3/MM3 (ref 3.4–10.8)

## 2023-10-27 PROCEDURE — 80053 COMPREHEN METABOLIC PANEL: CPT

## 2023-10-27 PROCEDURE — 85007 BL SMEAR W/DIFF WBC COUNT: CPT

## 2023-10-27 PROCEDURE — 36415 COLL VENOUS BLD VENIPUNCTURE: CPT

## 2023-10-27 PROCEDURE — 85025 COMPLETE CBC W/AUTO DIFF WBC: CPT

## 2023-10-30 ENCOUNTER — HOSPITAL ENCOUNTER (OUTPATIENT)
Dept: CT IMAGING | Facility: HOSPITAL | Age: 68
Discharge: HOME | End: 2023-10-30
Admitting: INTERNAL MEDICINE
Payer: COMMERCIAL

## 2023-10-30 DIAGNOSIS — C68.9 MALIGNANT NEOPLASM OF URINARY ORGAN: ICD-10-CM

## 2023-10-30 PROCEDURE — 71250 CT THORAX DX C-: CPT

## 2023-10-30 PROCEDURE — 74176 CT ABD & PELVIS W/O CONTRAST: CPT

## 2023-11-17 ENCOUNTER — TRANSCRIBE ORDERS (OUTPATIENT)
Dept: LAB | Facility: HOSPITAL | Age: 68
End: 2023-11-17
Payer: COMMERCIAL

## 2023-11-17 ENCOUNTER — LAB (OUTPATIENT)
Dept: LAB | Facility: HOSPITAL | Age: 68
End: 2023-11-17
Payer: MEDICARE

## 2023-11-17 DIAGNOSIS — C68.9 UROTHELIAL CARCINOMA: ICD-10-CM

## 2023-11-17 DIAGNOSIS — C68.9 UROTHELIAL CARCINOMA: Primary | ICD-10-CM

## 2023-11-17 LAB
ALBUMIN SERPL-MCNC: 3.9 G/DL (ref 3.5–5.2)
ALBUMIN/GLOB SERPL: 1.4 G/DL
ALP SERPL-CCNC: 97 U/L (ref 39–117)
ALT SERPL W P-5'-P-CCNC: 9 U/L (ref 1–41)
ANION GAP SERPL CALCULATED.3IONS-SCNC: 9.9 MMOL/L (ref 5–15)
ANISOCYTOSIS BLD QL: NORMAL
AST SERPL-CCNC: 17 U/L (ref 1–40)
BASOPHILS # BLD AUTO: 0.19 10*3/MM3 (ref 0–0.2)
BASOPHILS NFR BLD AUTO: 2.6 % (ref 0–1.5)
BILIRUB SERPL-MCNC: 0.3 MG/DL (ref 0–1.2)
BUN SERPL-MCNC: 12 MG/DL (ref 8–23)
BUN/CREAT SERPL: 9.7 (ref 7–25)
CALCIUM SPEC-SCNC: 9.4 MG/DL (ref 8.6–10.5)
CHLORIDE SERPL-SCNC: 103 MMOL/L (ref 98–107)
CO2 SERPL-SCNC: 23.1 MMOL/L (ref 22–29)
CREAT SERPL-MCNC: 1.24 MG/DL (ref 0.76–1.27)
DEPRECATED RDW RBC AUTO: 69.4 FL (ref 37–54)
EGFRCR SERPLBLD CKD-EPI 2021: 64.1 ML/MIN/1.73
EOSINOPHIL # BLD AUTO: 0.2 10*3/MM3 (ref 0–0.4)
EOSINOPHIL NFR BLD AUTO: 2.8 % (ref 0.3–6.2)
ERYTHROCYTE [DISTWIDTH] IN BLOOD BY AUTOMATED COUNT: 21.1 % (ref 12.3–15.4)
GLOBULIN UR ELPH-MCNC: 2.8 GM/DL
GLUCOSE SERPL-MCNC: 86 MG/DL (ref 65–99)
HCT VFR BLD AUTO: 31.8 % (ref 37.5–51)
HGB BLD-MCNC: 10.4 G/DL (ref 13–17.7)
IMM GRANULOCYTES # BLD AUTO: 0.17 10*3/MM3 (ref 0–0.05)
IMM GRANULOCYTES NFR BLD AUTO: 2.3 % (ref 0–0.5)
LYMPHOCYTES # BLD AUTO: 1.11 10*3/MM3 (ref 0.7–3.1)
LYMPHOCYTES NFR BLD AUTO: 15.3 % (ref 19.6–45.3)
MACROCYTES BLD QL SMEAR: NORMAL
MAGNESIUM SERPL-MCNC: 2.3 MG/DL (ref 1.6–2.4)
MCH RBC QN AUTO: 29.6 PG (ref 26.6–33)
MCHC RBC AUTO-ENTMCNC: 32.7 G/DL (ref 31.5–35.7)
MCV RBC AUTO: 90.6 FL (ref 79–97)
MICROCYTES BLD QL: NORMAL
MONOCYTES # BLD AUTO: 1.26 10*3/MM3 (ref 0.1–0.9)
MONOCYTES NFR BLD AUTO: 17.4 % (ref 5–12)
NEUTROPHILS NFR BLD AUTO: 4.31 10*3/MM3 (ref 1.7–7)
NEUTROPHILS NFR BLD AUTO: 59.6 % (ref 42.7–76)
NRBC BLD AUTO-RTO: 0 /100 WBC (ref 0–0.2)
PLATELET # BLD AUTO: 277 10*3/MM3 (ref 140–450)
PMV BLD AUTO: 8.4 FL (ref 6–12)
POTASSIUM SERPL-SCNC: 4.5 MMOL/L (ref 3.5–5.2)
PROT SERPL-MCNC: 6.7 G/DL (ref 6–8.5)
RBC # BLD AUTO: 3.51 10*6/MM3 (ref 4.14–5.8)
SMALL PLATELETS BLD QL SMEAR: ADEQUATE
SODIUM SERPL-SCNC: 136 MMOL/L (ref 136–145)
WBC MORPH BLD: NORMAL
WBC NRBC COR # BLD AUTO: 7.24 10*3/MM3 (ref 3.4–10.8)

## 2023-11-17 PROCEDURE — 85007 BL SMEAR W/DIFF WBC COUNT: CPT

## 2023-11-17 PROCEDURE — 80053 COMPREHEN METABOLIC PANEL: CPT

## 2023-11-17 PROCEDURE — 85025 COMPLETE CBC W/AUTO DIFF WBC: CPT

## 2023-11-17 PROCEDURE — 83735 ASSAY OF MAGNESIUM: CPT

## 2023-11-17 PROCEDURE — 36415 COLL VENOUS BLD VENIPUNCTURE: CPT

## 2023-11-28 ENCOUNTER — TRANSCRIBE ORDERS (OUTPATIENT)
Dept: LAB | Facility: HOSPITAL | Age: 68
End: 2023-11-28
Payer: COMMERCIAL

## 2023-11-28 ENCOUNTER — LAB (OUTPATIENT)
Dept: LAB | Facility: HOSPITAL | Age: 68
End: 2023-11-28
Payer: MEDICARE

## 2023-11-28 DIAGNOSIS — C68.9 MALIGNANT NEOPLASM OF URINARY ORGAN: Primary | ICD-10-CM

## 2023-11-28 DIAGNOSIS — C68.9 MALIGNANT NEOPLASM OF URINARY ORGAN: ICD-10-CM

## 2023-11-28 LAB
ALBUMIN SERPL-MCNC: 4 G/DL (ref 3.5–5.2)
ALBUMIN/GLOB SERPL: 1.4 G/DL
ALP SERPL-CCNC: 89 U/L (ref 39–117)
ALT SERPL W P-5'-P-CCNC: 18 U/L (ref 1–41)
ANION GAP SERPL CALCULATED.3IONS-SCNC: 8.4 MMOL/L (ref 5–15)
ANISOCYTOSIS BLD QL: NORMAL
AST SERPL-CCNC: 23 U/L (ref 1–40)
BASOPHILS # BLD AUTO: 0.04 10*3/MM3 (ref 0–0.2)
BASOPHILS NFR BLD AUTO: 0.7 % (ref 0–1.5)
BILIRUB SERPL-MCNC: 0.3 MG/DL (ref 0–1.2)
BUN SERPL-MCNC: 13 MG/DL (ref 8–23)
BUN/CREAT SERPL: 10 (ref 7–25)
CALCIUM SPEC-SCNC: 9.9 MG/DL (ref 8.6–10.5)
CHLORIDE SERPL-SCNC: 102 MMOL/L (ref 98–107)
CO2 SERPL-SCNC: 26.6 MMOL/L (ref 22–29)
CREAT SERPL-MCNC: 1.3 MG/DL (ref 0.76–1.27)
DEPRECATED RDW RBC AUTO: 71.3 FL (ref 37–54)
EGFRCR SERPLBLD CKD-EPI 2021: 60.6 ML/MIN/1.73
EOSINOPHIL # BLD AUTO: 0.35 10*3/MM3 (ref 0–0.4)
EOSINOPHIL NFR BLD AUTO: 6.2 % (ref 0.3–6.2)
ERYTHROCYTE [DISTWIDTH] IN BLOOD BY AUTOMATED COUNT: 21.1 % (ref 12.3–15.4)
GLOBULIN UR ELPH-MCNC: 2.9 GM/DL
GLUCOSE SERPL-MCNC: 113 MG/DL (ref 65–99)
HCT VFR BLD AUTO: 29.9 % (ref 37.5–51)
HGB BLD-MCNC: 9.5 G/DL (ref 13–17.7)
IMM GRANULOCYTES # BLD AUTO: 0.01 10*3/MM3 (ref 0–0.05)
IMM GRANULOCYTES NFR BLD AUTO: 0.2 % (ref 0–0.5)
LYMPHOCYTES # BLD AUTO: 1.14 10*3/MM3 (ref 0.7–3.1)
LYMPHOCYTES NFR BLD AUTO: 20.2 % (ref 19.6–45.3)
MAGNESIUM SERPL-MCNC: 2.3 MG/DL (ref 1.6–2.4)
MCH RBC QN AUTO: 29.7 PG (ref 26.6–33)
MCHC RBC AUTO-ENTMCNC: 31.8 G/DL (ref 31.5–35.7)
MCV RBC AUTO: 93.4 FL (ref 79–97)
MONOCYTES # BLD AUTO: 0.89 10*3/MM3 (ref 0.1–0.9)
MONOCYTES NFR BLD AUTO: 15.8 % (ref 5–12)
NEUTROPHILS NFR BLD AUTO: 3.2 10*3/MM3 (ref 1.7–7)
NEUTROPHILS NFR BLD AUTO: 56.9 % (ref 42.7–76)
NRBC BLD AUTO-RTO: 0 /100 WBC (ref 0–0.2)
PLAT MORPH BLD: NORMAL
PLATELET # BLD AUTO: 142 10*3/MM3 (ref 140–450)
PMV BLD AUTO: 9.4 FL (ref 6–12)
POTASSIUM SERPL-SCNC: 4.7 MMOL/L (ref 3.5–5.2)
PROT SERPL-MCNC: 6.9 G/DL (ref 6–8.5)
RBC # BLD AUTO: 3.2 10*6/MM3 (ref 4.14–5.8)
SODIUM SERPL-SCNC: 137 MMOL/L (ref 136–145)
WBC MORPH BLD: NORMAL
WBC NRBC COR # BLD AUTO: 5.63 10*3/MM3 (ref 3.4–10.8)

## 2023-11-28 PROCEDURE — 85025 COMPLETE CBC W/AUTO DIFF WBC: CPT

## 2023-11-28 PROCEDURE — 85007 BL SMEAR W/DIFF WBC COUNT: CPT

## 2023-11-28 PROCEDURE — 80053 COMPREHEN METABOLIC PANEL: CPT

## 2023-11-28 PROCEDURE — 83735 ASSAY OF MAGNESIUM: CPT

## 2023-11-28 PROCEDURE — 36415 COLL VENOUS BLD VENIPUNCTURE: CPT

## 2024-01-19 ENCOUNTER — TRANSCRIBE ORDERS (OUTPATIENT)
Dept: ADMINISTRATIVE | Facility: HOSPITAL | Age: 69
End: 2024-01-19
Payer: MEDICARE

## 2024-01-19 DIAGNOSIS — C68.0 CANCER OF URETHRA: Primary | ICD-10-CM

## 2024-01-19 DIAGNOSIS — C68.9 MALIGNANT NEOPLASM OF URINARY ORGAN: ICD-10-CM

## 2024-01-19 DIAGNOSIS — C68.9: ICD-10-CM

## 2024-05-07 ENCOUNTER — OFFICE VISIT (OUTPATIENT)
Dept: SURGERY | Facility: CLINIC | Age: 69
End: 2024-05-07
Payer: MEDICARE

## 2024-05-07 VITALS
WEIGHT: 245.2 LBS | TEMPERATURE: 97.7 F | BODY MASS INDEX: 33.21 KG/M2 | HEIGHT: 72 IN | RESPIRATION RATE: 18 BRPM | OXYGEN SATURATION: 96 % | DIASTOLIC BLOOD PRESSURE: 70 MMHG | HEART RATE: 69 BPM | SYSTOLIC BLOOD PRESSURE: 140 MMHG

## 2024-05-07 DIAGNOSIS — K64.4 EXTERNAL HEMORRHOIDS: Primary | ICD-10-CM

## 2024-05-07 PROBLEM — E66.9 OBESITY (BMI 30.0-34.9): Status: ACTIVE | Noted: 2024-05-07

## 2024-05-07 PROCEDURE — 99204 OFFICE O/P NEW MOD 45 MIN: CPT | Performed by: STUDENT IN AN ORGANIZED HEALTH CARE EDUCATION/TRAINING PROGRAM

## 2024-05-07 PROCEDURE — 3077F SYST BP >= 140 MM HG: CPT | Performed by: STUDENT IN AN ORGANIZED HEALTH CARE EDUCATION/TRAINING PROGRAM

## 2024-05-07 PROCEDURE — 1160F RVW MEDS BY RX/DR IN RCRD: CPT | Performed by: STUDENT IN AN ORGANIZED HEALTH CARE EDUCATION/TRAINING PROGRAM

## 2024-05-07 PROCEDURE — 1159F MED LIST DOCD IN RCRD: CPT | Performed by: STUDENT IN AN ORGANIZED HEALTH CARE EDUCATION/TRAINING PROGRAM

## 2024-05-07 PROCEDURE — 3078F DIAST BP <80 MM HG: CPT | Performed by: STUDENT IN AN ORGANIZED HEALTH CARE EDUCATION/TRAINING PROGRAM

## 2024-05-07 RX ORDER — HYDROCORTISONE 25 MG/G
CREAM TOPICAL
COMMUNITY
Start: 2024-02-19

## 2024-05-07 RX ORDER — HYDROCORTISONE ACETATE 25 MG/1
25 SUPPOSITORY RECTAL 3 TIMES DAILY
Qty: 21 SUPPOSITORY | Refills: 0 | Status: SHIPPED | OUTPATIENT
Start: 2024-05-07 | End: 2024-05-14

## 2024-05-07 RX ORDER — PROCHLORPERAZINE MALEATE 10 MG
TABLET ORAL
COMMUNITY
Start: 2024-03-27

## 2024-05-07 RX ORDER — FLUTICASONE PROPIONATE 50 MCG
SPRAY, SUSPENSION (ML) NASAL
COMMUNITY
Start: 2024-03-27

## 2024-05-10 ENCOUNTER — PATIENT ROUNDING (BHMG ONLY) (OUTPATIENT)
Dept: SURGERY | Facility: CLINIC | Age: 69
End: 2024-05-10
Payer: MEDICARE

## 2024-10-11 ENCOUNTER — HOSPITAL ENCOUNTER (EMERGENCY)
Facility: HOSPITAL | Age: 69
Discharge: ANOTHER HOSPITAL | End: 2024-10-12
Attending: STUDENT IN AN ORGANIZED HEALTH CARE EDUCATION/TRAINING PROGRAM
Payer: MEDICARE

## 2024-10-11 DIAGNOSIS — N39.0 URINARY TRACT INFECTION WITH HEMATURIA, SITE UNSPECIFIED: Primary | ICD-10-CM

## 2024-10-11 DIAGNOSIS — R29.898 WEAKNESS OF BOTH LOWER EXTREMITIES: ICD-10-CM

## 2024-10-11 DIAGNOSIS — R20.2 PARESTHESIA OF SADDLE AREA: ICD-10-CM

## 2024-10-11 DIAGNOSIS — R31.9 URINARY TRACT INFECTION WITH HEMATURIA, SITE UNSPECIFIED: Primary | ICD-10-CM

## 2024-10-11 LAB
ANION GAP SERPL CALCULATED.3IONS-SCNC: 10 MMOL/L (ref 3–16)
BACTERIA URNS QL MICRO: ABNORMAL /HPF
BASOPHILS # BLD: 0 K/UL (ref 0–0.1)
BASOPHILS NFR BLD: 0.2 %
BILIRUB UR QL STRIP.AUTO: NEGATIVE
BUN SERPL-MCNC: 23 MG/DL (ref 6–20)
CALCIUM SERPL-MCNC: 9.3 MG/DL (ref 8.5–10.5)
CHLORIDE SERPL-SCNC: 98 MMOL/L (ref 98–107)
CLARITY UR: ABNORMAL
CO2 SERPL-SCNC: 28 MMOL/L (ref 20–30)
COLOR UR: YELLOW
CREAT SERPL-MCNC: 1.2 MG/DL (ref 0.4–1.2)
CRP SERPL-MCNC: 98.5 MG/L (ref 0–5.1)
EOSINOPHIL # BLD: 0 K/UL (ref 0–0.4)
EOSINOPHIL NFR BLD: 0.3 %
ERYTHROCYTE [DISTWIDTH] IN BLOOD BY AUTOMATED COUNT: 18.5 % (ref 11–16)
ERYTHROCYTE [SEDIMENTATION RATE] IN BLOOD BY WESTERGREN METHOD: 18 MM/HR (ref 0–20)
GFR SERPLBLD CREATININE-BSD FMLA CKD-EPI: 66 ML/MIN/{1.73_M2}
GLUCOSE SERPL-MCNC: 102 MG/DL (ref 74–106)
GLUCOSE UR STRIP.AUTO-MCNC: NEGATIVE MG/DL
HCT VFR BLD AUTO: 36.1 % (ref 40–54)
HGB BLD-MCNC: 11.8 G/DL (ref 13–18)
HGB UR QL STRIP.AUTO: ABNORMAL
IMM GRANULOCYTES # BLD: 0 K/UL
IMM GRANULOCYTES NFR BLD: 0.5 % (ref 0–5)
KETONES UR STRIP.AUTO-MCNC: NEGATIVE MG/DL
LEUKOCYTE ESTERASE UR QL STRIP.AUTO: ABNORMAL
LYMPHOCYTES # BLD: 0.2 K/UL (ref 1.5–4)
LYMPHOCYTES NFR BLD: 1.7 %
MCH RBC QN AUTO: 29.1 PG (ref 27–32)
MCHC RBC AUTO-ENTMCNC: 32.7 G/DL (ref 31–35)
MCV RBC AUTO: 89.1 FL (ref 80–100)
MONOCYTES # BLD: 0.1 K/UL (ref 0.2–0.8)
MONOCYTES NFR BLD: 1.1 %
NEUTROPHILS # BLD: 8.5 K/UL (ref 2–7.5)
NEUTS SEG NFR BLD: 96.2 %
NITRITE UR QL STRIP.AUTO: POSITIVE
PH UR STRIP.AUTO: 7.5 [PH] (ref 5–8)
PLATELET # BLD AUTO: 149 K/UL (ref 150–400)
PMV BLD AUTO: 8.1 FL (ref 6–10)
POTASSIUM SERPL-SCNC: 4.5 MMOL/L (ref 3.4–5.1)
PROT UR STRIP.AUTO-MCNC: 30 MG/DL
RBC # BLD AUTO: 4.05 M/UL (ref 4.5–6)
RBC #/AREA URNS HPF: ABNORMAL /HPF (ref 0–4)
SODIUM SERPL-SCNC: 136 MMOL/L (ref 136–145)
SP GR UR STRIP.AUTO: 1.01 (ref 1–1.03)
UA DIPSTICK W REFLEX MICRO PNL UR: YES
URN SPEC COLLECT METH UR: ABNORMAL
UROBILINOGEN UR STRIP-ACNC: 0.2 E.U./DL
WBC # BLD AUTO: 8.8 K/UL (ref 4–11)
WBC #/AREA URNS HPF: ABNORMAL /HPF (ref 0–5)

## 2024-10-11 PROCEDURE — 87186 SC STD MICRODIL/AGAR DIL: CPT

## 2024-10-11 PROCEDURE — 6360000002 HC RX W HCPCS: Performed by: STUDENT IN AN ORGANIZED HEALTH CARE EDUCATION/TRAINING PROGRAM

## 2024-10-11 PROCEDURE — 85652 RBC SED RATE AUTOMATED: CPT

## 2024-10-11 PROCEDURE — 80048 BASIC METABOLIC PNL TOTAL CA: CPT

## 2024-10-11 PROCEDURE — 87184 SC STD DISK METHOD PER PLATE: CPT

## 2024-10-11 PROCEDURE — 6370000000 HC RX 637 (ALT 250 FOR IP): Performed by: STUDENT IN AN ORGANIZED HEALTH CARE EDUCATION/TRAINING PROGRAM

## 2024-10-11 PROCEDURE — 99285 EMERGENCY DEPT VISIT HI MDM: CPT

## 2024-10-11 PROCEDURE — 87088 URINE BACTERIA CULTURE: CPT

## 2024-10-11 PROCEDURE — 85025 COMPLETE CBC W/AUTO DIFF WBC: CPT

## 2024-10-11 PROCEDURE — 81001 URINALYSIS AUTO W/SCOPE: CPT

## 2024-10-11 PROCEDURE — 87086 URINE CULTURE/COLONY COUNT: CPT

## 2024-10-11 PROCEDURE — 36415 COLL VENOUS BLD VENIPUNCTURE: CPT

## 2024-10-11 PROCEDURE — 96365 THER/PROPH/DIAG IV INF INIT: CPT

## 2024-10-11 PROCEDURE — 86140 C-REACTIVE PROTEIN: CPT

## 2024-10-11 PROCEDURE — 2580000003 HC RX 258: Performed by: STUDENT IN AN ORGANIZED HEALTH CARE EDUCATION/TRAINING PROGRAM

## 2024-10-11 RX ORDER — OXYCODONE AND ACETAMINOPHEN 10; 325 MG/1; MG/1
1 TABLET ORAL EVERY 4 HOURS PRN
COMMUNITY

## 2024-10-11 RX ORDER — SODIUM CHLORIDE, SODIUM LACTATE, POTASSIUM CHLORIDE, AND CALCIUM CHLORIDE .6; .31; .03; .02 G/100ML; G/100ML; G/100ML; G/100ML
1000 INJECTION, SOLUTION INTRAVENOUS ONCE
Status: COMPLETED | OUTPATIENT
Start: 2024-10-11 | End: 2024-10-12

## 2024-10-11 RX ORDER — OXYCODONE AND ACETAMINOPHEN 5; 325 MG/1; MG/1
1 TABLET ORAL ONCE
Status: COMPLETED | OUTPATIENT
Start: 2024-10-11 | End: 2024-10-11

## 2024-10-11 RX ORDER — VERAPAMIL HYDROCHLORIDE 120 MG/1
240 TABLET, FILM COATED, EXTENDED RELEASE ORAL NIGHTLY
Status: DISCONTINUED | OUTPATIENT
Start: 2024-10-11 | End: 2024-10-12 | Stop reason: HOSPADM

## 2024-10-11 RX ADMIN — CEFTRIAXONE 1000 MG: 1 INJECTION, POWDER, FOR SOLUTION INTRAMUSCULAR; INTRAVENOUS at 23:20

## 2024-10-11 RX ADMIN — OXYCODONE HYDROCHLORIDE AND ACETAMINOPHEN 1 TABLET: 5; 325 TABLET ORAL at 22:50

## 2024-10-11 RX ADMIN — VERAPAMIL HYDROCHLORIDE 240 MG: 120 TABLET, FILM COATED, EXTENDED RELEASE ORAL at 23:17

## 2024-10-11 ASSESSMENT — PAIN SCALES - GENERAL
PAINLEVEL_OUTOF10: 9
PAINLEVEL_OUTOF10: 9

## 2024-10-11 ASSESSMENT — PAIN - FUNCTIONAL ASSESSMENT
PAIN_FUNCTIONAL_ASSESSMENT: ACTIVITIES ARE NOT PREVENTED
PAIN_FUNCTIONAL_ASSESSMENT: 0-10

## 2024-10-11 ASSESSMENT — PAIN DESCRIPTION - LOCATION: LOCATION: PELVIS;PENIS

## 2024-10-11 ASSESSMENT — PAIN DESCRIPTION - DESCRIPTORS: DESCRIPTORS: ACHING;BURNING

## 2024-10-11 ASSESSMENT — PAIN DESCRIPTION - PAIN TYPE: TYPE: ACUTE PAIN

## 2024-10-12 VITALS
HEIGHT: 72 IN | HEART RATE: 80 BPM | RESPIRATION RATE: 98 BRPM | TEMPERATURE: 99.7 F | BODY MASS INDEX: 31.83 KG/M2 | DIASTOLIC BLOOD PRESSURE: 67 MMHG | WEIGHT: 235 LBS | SYSTOLIC BLOOD PRESSURE: 130 MMHG | OXYGEN SATURATION: 96 %

## 2024-10-12 PROCEDURE — 96361 HYDRATE IV INFUSION ADD-ON: CPT

## 2024-10-12 PROCEDURE — 2580000003 HC RX 258: Performed by: STUDENT IN AN ORGANIZED HEALTH CARE EDUCATION/TRAINING PROGRAM

## 2024-10-12 PROCEDURE — 6370000000 HC RX 637 (ALT 250 FOR IP): Performed by: STUDENT IN AN ORGANIZED HEALTH CARE EDUCATION/TRAINING PROGRAM

## 2024-10-12 RX ORDER — OXYCODONE AND ACETAMINOPHEN 5; 325 MG/1; MG/1
1 TABLET ORAL ONCE
Status: COMPLETED | OUTPATIENT
Start: 2024-10-12 | End: 2024-10-12

## 2024-10-12 RX ADMIN — OXYCODONE HYDROCHLORIDE AND ACETAMINOPHEN 1 TABLET: 5; 325 TABLET ORAL at 00:24

## 2024-10-12 RX ADMIN — OXYCODONE HYDROCHLORIDE AND ACETAMINOPHEN 1 TABLET: 5; 325 TABLET ORAL at 03:36

## 2024-10-12 RX ADMIN — SODIUM CHLORIDE, POTASSIUM CHLORIDE, SODIUM LACTATE AND CALCIUM CHLORIDE 1000 ML: 600; 310; 30; 20 INJECTION, SOLUTION INTRAVENOUS at 00:08

## 2024-10-12 ASSESSMENT — PAIN SCALES - GENERAL
PAINLEVEL_OUTOF10: 9
PAINLEVEL_OUTOF10: 8

## 2024-10-12 ASSESSMENT — PAIN DESCRIPTION - LOCATION: LOCATION: PELVIS

## 2024-10-12 NOTE — ED NOTES
[facility] EMS report: called to patient's residence for unresponsiveness. Upon EMS arrival, patient found responsive and alert and oriented x 4. LYRIC Wilson, present at patient's house. Patient has history bladder cancer with mets to penis. He had his bladder removed. He is complaining of penis pain and burning in his pelvis. Vital signs /80, -140, Temp 99.8, GCS 15, Glucose 134.

## 2024-10-12 NOTE — PROGRESS NOTES
Call received from [facility] and the [facility], [facility] has no beds at this time. Call initiated to  and [facility] at this time.

## 2024-10-12 NOTE — ED PROVIDER NOTES
(3/20/2024)    Received from [employer]    Housing Stability     : 0     : 0       SCREENINGS    [place] Coma Scale  Eye Opening: Spontaneous  Best Verbal Response: Oriented  Best Motor Response: Obeys commands  Monty Coma Scale Score: 15      PHYSICAL EXAM       ED Triage Vitals [10/11/24 2217]   BP Systolic BP Percentile Diastolic BP Percentile Temp Temp src Pulse Respirations SpO2   (!) 141/78 -- -- 99.7 °F (37.6 °C) -- (!) 137 18 96 %      Height Weight - Scale         1.829 m (6') 106.6 kg (235 lb)             Physical Exam  BP 93/70   Pulse 80   Temp 99.7 °F (37.6 °C)   Resp 18   Ht 1.829 m (6')   Wt 106.6 kg (235 lb)   SpO2 (!) 88%   BMI 31.87 kg/m²   Constitutional: Alert, awake, appears non-toxic.  Head: Atraumatic.  Normocephalic.  Neck: Supple, trachea midline, no midline cervical tenderness.  Heart: Tachycardic rate and regular rhythm.     Vascular: Radial pulses 2+ and symmetric.  Lungs: Normal respiratory pattern without conversational dyspnea or respiratory distress.  Back: Lumbar midline bony tenderness, with no deformities, or step-offs of the thoracic or lumbar spine. No abrasions or bruising. No erythema, induration or fluctuance.  : Decreased sensation in the perineal region  Neurologic: Mental status: Alert and oriented to person, place and time.  Attention, recent recall and speech normal.  Musculoskeletal strength: LE 3/5 symmetric to hip flexion/extension.  UE 5/5 symmetric to  shoulder abduction, elbow flexion/extension, wrist flexion/extension.  Sensation intact throughout the dermatomes of the upper and lower extremities with exception of decreased sensation in the saddle region.  No truncal ataxia.        DIAGNOSTIC RESULTS     RADIOLOGY   Interpretation per the Radiologist below, if available at the time of this note:  No results found.    ED BEDSIDE ULTRASOUND:   Performed by ED Physician -none    LABS:  Labs Reviewed   CBC WITH AUTO DIFFERENTIAL - Abnormal;  conditions impacting care:  Bladder cancer with resection and metastasis.    Social determinants of health affecting care:        ED Medications managed:  Medications   verapamil (CALAN SR) extended release tablet 240 mg (240 mg Oral Given 10/11/24 2317)   oxyCODONE-acetaminophen (PERCOCET) 5-325 MG per tablet 1 tablet (1 tablet Oral Given 10/11/24 2250)   cefTRIAXone (ROCEPHIN) 1,000 mg in sodium chloride 0.9 % 50 mL IVPB (mini-bag) (0 mg IntraVENous Stopped 10/11/24 2350)   lactated ringers bolus 1,000 mL (0 mLs IntraVENous Stopped 10/12/24 0149)   oxyCODONE-acetaminophen (PERCOCET) 5-325 MG per tablet 1 tablet (1 tablet Oral Given 10/12/24 0024)       PROCEDURES:  Unless otherwise noted below, none.     Procedures    FINAL IMPRESSION      1. Urinary tract infection with hematuria, site unspecified    2. Weakness of both lower extremities    3. Paresthesia of saddle area          DISPOSITION    Decision To Transfer 10/12/2024 12:15:21 AM      PATIENT REFERRED TO:  No follow-up provider specified.    DISCHARGE MEDICATIONS:  New Prescriptions    No medications on file        (Comment: Please note this report has been produced using speech recognition software and may contain errors related to that system including errors in grammar, punctuation, and spelling, as well as words and phrases that may be inappropriate.  If there are any questions or concerns please feel free to contact the dictating provider for clarification.)    LEI DEAN MD (electronically signed)  Emergency Medicine Provider        Lei Dean MD  10/12/24 8718

## 2024-10-12 NOTE — ED TRIAGE NOTES
EMS called for unresponsiveness but was responsive when they arrived, now complaining of pelvic pain.

## 2024-10-13 LAB
BACTERIA UR CULT: ABNORMAL
ORGANISM: ABNORMAL

## 2024-10-15 LAB
BACTERIA UR CULT: ABNORMAL
ORGANISM: ABNORMAL

## 2024-12-04 LAB
CYTO UR: NORMAL
LAB AP CASE REPORT: NORMAL
LAB AP CLINICAL INFORMATION: NORMAL
LAB AP OUTSIDE REPORT, ADDENDUM: NORMAL
Lab: NORMAL
PATH REPORT.FINAL DX SPEC: NORMAL
PATH REPORT.GROSS SPEC: NORMAL

## (undated) DEVICE — CANNULA SEAL

## (undated) DEVICE — TISSUE RETRIEVAL SYSTEM: Brand: INZII RETRIEVAL SYSTEM

## (undated) DEVICE — SUT VIC 4/0 RB1 27IN VCP214H

## (undated) DEVICE — ANTIBACTERIAL VIOLET BRAIDED (POLYGLACTIN 910), SYNTHETIC ABSORBABLE SUTURE: Brand: COATED VICRYL

## (undated) DEVICE — TRAP FLD MINIVAC MEGADYNE 100ML

## (undated) DEVICE — DISH PETRI 3.5IN MD STRL LF

## (undated) DEVICE — GLV SURG SIGNATURE TOUCH PF LTX 7.5 STRL BX/50

## (undated) DEVICE — SUT PDS LP 1 TP1 96IN VIO PDP880GA

## (undated) DEVICE — UROSTOMY DRAIN TUBE ADAPTER: Brand: HOLLISTER

## (undated) DEVICE — ANTIBACTERIAL UNDYED BRAIDED (POLYGLACTIN 910), SYNTHETIC ABSORBABLE SUTURE: Brand: COATED VICRYL

## (undated) DEVICE — ENDOPATH XCEL BLADELESS TROCARS WITH STABILITY SLEEVES: Brand: ENDOPATH XCEL

## (undated) DEVICE — GOWN,NON-REINFORCED,SIRUS,SET IN SLV,XL: Brand: MEDLINE

## (undated) DEVICE — MEDI-VAC YANKAUER SUCTION HANDLE W/BULBOUS TIP: Brand: CARDINAL HEALTH

## (undated) DEVICE — ST TBG CONN PNEUMOCLEAR EVAC SMOKE HEAT/HUMID

## (undated) DEVICE — ENDOPOUCH RETRIEVER SPECIMEN RETRIEVAL BAGS: Brand: ENDOPOUCH RETRIEVER

## (undated) DEVICE — PATIENT RETURN ELECTRODE, SINGLE-USE, CONTACT QUALITY MONITORING, ADULT, WITH 9FT CORD, FOR PATIENTS WEIGING OVER 33LBS. (15KG): Brand: MEGADYNE

## (undated) DEVICE — DRSNG WND BORDR/ADHS NONADHR/GZ LF 2X2IN STRL

## (undated) DEVICE — SUT VIC 3/0 SH CR8 27IN VCP784D

## (undated) DEVICE — TBG PENCL TELESCP MEGADYNE SMOKE EVAC 10FT

## (undated) DEVICE — SUT SILK 3/0 SH CR8 18IN C013D

## (undated) DEVICE — SUT SILK 2/0 PS 18IN 1588H

## (undated) DEVICE — ENDOPATH PNEUMONEEDLE INSUFFLATION NEEDLES WITH LUER LOCK CONNECTORS 120MM: Brand: ENDOPATH

## (undated) DEVICE — PK UROL DAVINCI 10

## (undated) DEVICE — GLV SURG SENSICARE PI MIC PF SZ8 LF STRL

## (undated) DEVICE — VIOLET POLYDIOXANONE POLYMER, SYNTHETIC ABSORBABLE SUTURE CLIPS: Brand: LAPRA-TY

## (undated) DEVICE — 2-PIECE OSTOMY SKIN BARRIER, FLEXTEND: Brand: NEW IMAGE

## (undated) DEVICE — LEX PROSTATE ACCESSORY PACK: Brand: MEDLINE INDUSTRIES, INC.

## (undated) DEVICE — TIP COVER ACCESSORY

## (undated) DEVICE — TOWEL,OR,DSP,ST,BLUE,STD,4/PK,20PK/CS: Brand: MEDLINE

## (undated) DEVICE — BLANKT WARM UPPR/BDY ARM/OUT 57X196CM

## (undated) DEVICE — VESSEL SEALER EXTEND: Brand: ENDOWRIST

## (undated) DEVICE — BLADELESS OBTURATOR: Brand: WECK VISTA

## (undated) DEVICE — SUT VIC 0 UR6 27IN VCP603H

## (undated) DEVICE — 2-PIECE UROSTOMY POUCH: Brand: NEW IMAGE

## (undated) DEVICE — DRSNG WND GZ PAD BORDERED 4X8IN STRL

## (undated) DEVICE — ENDOCUT SCISSOR TIP, DISPOSABLE: Brand: RENEW

## (undated) DEVICE — SUT PDS MONO 0 36 CT1 VIL PDP346H

## (undated) DEVICE — 3M™ STERI-DRAPE™ OPERATION TAPE, 10 CM X 55 CM 9099: Brand: 3M™ STERI-DRAPE™

## (undated) DEVICE — ADHS LIQ MASTISOL 2/3ML

## (undated) DEVICE — COLUMN DRAPE

## (undated) DEVICE — SPNG LAP PREWSH SFTPK 18X18IN STRL PK/5

## (undated) DEVICE — ARM DRAPE

## (undated) DEVICE — MARYLAND JAW LAPAROSCOPIC SEALER/DIVIDER COATED: Brand: LIGASURE

## (undated) DEVICE — SUT MNCRYL 4/0 RB1 27IN UD MCP214H